# Patient Record
Sex: FEMALE | Race: WHITE | Employment: FULL TIME | URBAN - METROPOLITAN AREA
[De-identification: names, ages, dates, MRNs, and addresses within clinical notes are randomized per-mention and may not be internally consistent; named-entity substitution may affect disease eponyms.]

---

## 2017-02-01 ENCOUNTER — HOSPITAL ENCOUNTER (OUTPATIENT)
Dept: BONE DENSITY | Age: 47
Discharge: HOME | End: 2017-02-01
Attending: INTERNAL MEDICINE
Payer: COMMERCIAL

## 2017-02-01 DIAGNOSIS — K52.81 EOSINOPHILIC GASTROENTERITIS: ICD-10-CM

## 2017-02-01 PROCEDURE — 77080 DXA BONE DENSITY AXIAL: CPT

## 2017-02-17 ENCOUNTER — HOSPITAL ENCOUNTER (EMERGENCY)
Facility: HOSPITAL | Age: 47
Discharge: HOME | End: 2017-02-17
Attending: EMERGENCY MEDICINE
Payer: COMMERCIAL

## 2017-02-17 VITALS
SYSTOLIC BLOOD PRESSURE: 124 MMHG | OXYGEN SATURATION: 92 % | TEMPERATURE: 97 F | HEART RATE: 91 BPM | HEIGHT: 64 IN | DIASTOLIC BLOOD PRESSURE: 76 MMHG | BODY MASS INDEX: 50.02 KG/M2 | RESPIRATION RATE: 18 BRPM | WEIGHT: 293 LBS

## 2017-02-17 DIAGNOSIS — M54.50 BACK PAIN, LUMBOSACRAL: Primary | ICD-10-CM

## 2017-02-17 PROCEDURE — 96376 TX/PRO/DX INJ SAME DRUG ADON: CPT

## 2017-02-17 PROCEDURE — 96374 THER/PROPH/DIAG INJ IV PUSH: CPT

## 2017-02-17 PROCEDURE — 96375 TX/PRO/DX INJ NEW DRUG ADDON: CPT

## 2017-02-17 PROCEDURE — 99285 EMERGENCY DEPT VISIT HI MDM: CPT

## 2017-02-17 PROCEDURE — 99284 EMERGENCY DEPT VISIT MOD MDM: CPT

## 2017-02-17 RX ORDER — KETOROLAC TROMETHAMINE 30 MG/ML
15 INJECTION, SOLUTION INTRAMUSCULAR; INTRAVENOUS ONCE
Status: COMPLETED | OUTPATIENT
Start: 2017-02-17 | End: 2017-02-17

## 2017-02-17 RX ORDER — HYDROCODONE BITARTRATE AND ACETAMINOPHEN 5; 325 MG/1; MG/1
1-2 TABLET ORAL EVERY 4 HOURS PRN
Qty: 20 TABLET | Refills: 0 | Status: SHIPPED | OUTPATIENT
Start: 2017-02-17 | End: 2017-02-24

## 2017-02-17 RX ORDER — CYCLOBENZAPRINE HCL 10 MG
10 TABLET ORAL 3 TIMES DAILY PRN
Qty: 20 TABLET | Refills: 0 | Status: SHIPPED | OUTPATIENT
Start: 2017-02-17 | End: 2017-02-24

## 2017-02-17 RX ORDER — HYDROMORPHONE HYDROCHLORIDE 1 MG/ML
1 INJECTION, SOLUTION INTRAMUSCULAR; INTRAVENOUS; SUBCUTANEOUS ONCE
Status: COMPLETED | OUTPATIENT
Start: 2017-02-17 | End: 2017-02-17

## 2017-02-17 RX ORDER — DEXAMETHASONE SODIUM PHOSPHATE 4 MG/ML
10 VIAL (ML) INJECTION ONCE
Status: COMPLETED | OUTPATIENT
Start: 2017-02-17 | End: 2017-02-17

## 2017-02-17 RX ORDER — METHYLPREDNISOLONE 4 MG/1
TABLET ORAL
Qty: 1 PACKAGE | Refills: 0 | Status: SHIPPED | OUTPATIENT
Start: 2017-02-17 | End: 2017-02-22

## 2017-02-17 RX ORDER — ONDANSETRON 2 MG/ML
4 INJECTION INTRAMUSCULAR; INTRAVENOUS ONCE
Status: COMPLETED | OUTPATIENT
Start: 2017-02-17 | End: 2017-02-17

## 2017-02-17 NOTE — ED NOTES
Patient denies any change in pain after medications.   Side rails x 2 up, call light in reach  VSS, will update MD

## 2017-02-17 NOTE — ED PROVIDER NOTES
Patient Seen in: [facility] Emergency Department    History   Patient presents with:  Back Pain (musculoskeletal)    Stated Complaint: back pain    HPI    53-year-old female complaining of back pain this patient has a history of back pain had an MRI OTHER SURGICAL HISTORY      Comment upper endoscopy    COLONOSCOPY  8/14/2013    Comment Procedure: COLONOSCOPY;  Surgeon: Margoth Cline MD;  Location: [facility] ENDOSCOPY    ENDOMETRIAL ABLATION      DILATION/CURETTAGE,DIAGNOSTIC      COLONOSCOPY  10/3/2014 escitalopram 10 MG Oral Tab,  1 tablet nightly   Pantoprazole Sodium (PROTONIX) 40 MG Oral Tab EC,  TAKE 1 TABLET BY MOUTH ONCE DAILY   Hyoscyamine Sulfate ER (HYOMAX-SR) 0.375 MG Oral Tablet 12 Hr,  Take 1 tablet by mouth daily as needed.    ARIpiprazole ( IV was started patient was given 2 doses of Dilaudid as well as Toradol Decadron was feeling better able ambulate prior to discharge she was advised to follow-up with [facility] for possible epidural injection next week she was give

## 2017-02-17 NOTE — ED INITIAL ASSESSMENT (HPI)
Patient c/o lower back and leg aching yesterday during the day. Went to the restroom in the middle of the night and had sudden onset of right lower back pain going down right leg.   Now reports she has had chronic back pain since October, had an injection

## 2017-06-28 PROCEDURE — 82607 VITAMIN B-12: CPT | Performed by: INTERNAL MEDICINE

## 2017-06-28 PROCEDURE — 82746 ASSAY OF FOLIC ACID SERUM: CPT | Performed by: INTERNAL MEDICINE

## 2018-04-24 NOTE — LETTER
Date: 10/30/2024    Patient Name: Jennifer Wills          To Whom it may concern:    The above patient was seen at [facility] for treatment of a medical condition.    This patient should be excused from attending work/school from 10/30/24 through 11/1/24.        Sincerely,        Cortney Warner MD      
(3) adequate

## 2018-05-30 ENCOUNTER — APPOINTMENT (OUTPATIENT)
Dept: CT IMAGING | Facility: HOSPITAL | Age: 48
End: 2018-05-30
Attending: EMERGENCY MEDICINE
Payer: COMMERCIAL

## 2018-05-30 ENCOUNTER — HOSPITAL ENCOUNTER (EMERGENCY)
Facility: HOSPITAL | Age: 48
Discharge: HOME | End: 2018-05-30
Attending: EMERGENCY MEDICINE
Payer: COMMERCIAL

## 2018-05-30 ENCOUNTER — APPOINTMENT (OUTPATIENT)
Dept: GENERAL RADIOLOGY | Facility: HOSPITAL | Age: 48
End: 2018-05-30
Attending: EMERGENCY MEDICINE
Payer: COMMERCIAL

## 2018-05-30 ENCOUNTER — PRIOR ORIGINAL RECORDS (OUTPATIENT)
Dept: OTHER | Age: 48
End: 2018-05-30

## 2018-05-30 VITALS
BODY MASS INDEX: 50.02 KG/M2 | TEMPERATURE: 99 F | SYSTOLIC BLOOD PRESSURE: 132 MMHG | OXYGEN SATURATION: 98 % | HEART RATE: 72 BPM | HEIGHT: 64 IN | DIASTOLIC BLOOD PRESSURE: 70 MMHG | RESPIRATION RATE: 14 BRPM | WEIGHT: 293 LBS

## 2018-05-30 DIAGNOSIS — R07.9 CHEST PAIN OF UNCERTAIN ETIOLOGY: Primary | ICD-10-CM

## 2018-05-30 PROCEDURE — 93010 ELECTROCARDIOGRAM REPORT: CPT

## 2018-05-30 PROCEDURE — 99285 EMERGENCY DEPT VISIT HI MDM: CPT

## 2018-05-30 PROCEDURE — 84484 ASSAY OF TROPONIN QUANT: CPT | Performed by: EMERGENCY MEDICINE

## 2018-05-30 PROCEDURE — 71275 CT ANGIOGRAPHY CHEST: CPT | Performed by: EMERGENCY MEDICINE

## 2018-05-30 PROCEDURE — 85378 FIBRIN DEGRADE SEMIQUANT: CPT | Performed by: EMERGENCY MEDICINE

## 2018-05-30 PROCEDURE — 80053 COMPREHEN METABOLIC PANEL: CPT | Performed by: EMERGENCY MEDICINE

## 2018-05-30 PROCEDURE — 71045 X-RAY EXAM CHEST 1 VIEW: CPT | Performed by: EMERGENCY MEDICINE

## 2018-05-30 PROCEDURE — 85025 COMPLETE CBC W/AUTO DIFF WBC: CPT | Performed by: EMERGENCY MEDICINE

## 2018-05-30 PROCEDURE — 93005 ELECTROCARDIOGRAM TRACING: CPT

## 2018-05-30 PROCEDURE — 36415 COLL VENOUS BLD VENIPUNCTURE: CPT

## 2018-05-30 NOTE — ED INITIAL ASSESSMENT (HPI)
Pt reports chest tightness, leg edema at the end of the day, dyspnea, dizziness, nausea for the past 8 months.

## 2018-05-30 NOTE — ED PROVIDER NOTES
Patient Seen in: [facility] Emergency Department    History   Patient presents with:  Chest Pain Angina (cardiovascular)    Stated Complaint: chest pain    HPI    This is a 40-year-old female presenting to the emergency department for chest pain.   Dereje Jones COLONOSCOPY      Comment: Procedure: COLONOSCOPY;  Surgeon: Thierno Villatoro MD;  Location: [address] [place] [address] ENDOSCOPY  10/2014: COLONOSCOPY      Comment: repeat in 10 years  10/8/2014: COLONOSCOPY N/A      Comment: Procedure: COLONOSCOPY;  Surgeon: Ramakrishna Joshi no wheezes retractions or rhonchi noted  Cardiovascular exam shows a regular rate and rhythm  Abdomen soft nontender with no rebound tenderness noted  Extremity exam shows no clubbing cyanosis or edema  Skin exam shows no rashes or lacerations  Neuro exam CBC W/ DIFFERENTIAL[[id number]]          Abnormal            Final result                 Please view results for these tests on the individual orders.    POCT PREGNANCY, URINE   RAINBOW DRAW BLUE   RAINBOW DRAW LAVENDER   RAINBOW DRAW LIGHT GRE

## 2018-07-12 LAB
ALBUMIN: 3.5 G/DL
ALKALINE PHOSPHATATE(ALK PHOS): 136 IU/L
BILIRUBIN TOTAL: 0.2 MG/DL
BUN: 13 MG/DL
CALCIUM: 9.1 MG/DL
CHLORIDE: 102 MEQ/L
CREATININE, SERUM: 1.07 MG/DL
GLUCOSE: 109 MG/DL
HEMATOCRIT: 39.5 %
HEMOGLOBIN: 12.5 G/DL
PLATELETS: 338 K/UL
POTASSIUM, SERUM: 3.3 MEQ/L
PROTEIN, TOTAL: 8.4 G/DL
RED BLOOD COUNT: 5.35 X 10-6/U
SGOT (AST): 16 IU/L
SGPT (ALT): 33 IU/L
SODIUM: 136 MEQ/L
WHITE BLOOD COUNT: 8.6 X 10-3/U

## 2018-08-06 PROBLEM — R12 HEARTBURN: Status: ACTIVE | Noted: 2018-08-06

## 2018-09-26 PROBLEM — F41.1 ANXIETY STATE: Status: ACTIVE | Noted: 2018-09-26

## 2018-09-26 PROBLEM — G47.00 INSOMNIA: Status: ACTIVE | Noted: 2018-09-26

## 2018-11-01 ENCOUNTER — IMMUNIZATION (OUTPATIENT)
Dept: FAMILY MEDICINE CLINIC | Facility: CLINIC | Age: 48
End: 2018-11-01
Payer: COMMERCIAL

## 2018-11-01 DIAGNOSIS — Z23 NEED FOR VACCINATION: ICD-10-CM

## 2018-11-01 PROCEDURE — 90686 IIV4 VACC NO PRSV 0.5 ML IM: CPT | Performed by: NURSE PRACTITIONER

## 2018-11-01 PROCEDURE — 90471 IMMUNIZATION ADMIN: CPT | Performed by: NURSE PRACTITIONER

## 2018-11-12 PROBLEM — R41.840 ATTENTION AND CONCENTRATION DEFICIT: Status: ACTIVE | Noted: 2018-11-12

## 2018-11-28 PROBLEM — IMO0002 CHRONIC MIGRAINE: Status: ACTIVE | Noted: 2018-11-28

## 2019-01-01 ENCOUNTER — EXTERNAL RECORD (OUTPATIENT)
Dept: HEALTH INFORMATION MANAGEMENT | Facility: OTHER | Age: 49
End: 2019-01-01

## 2019-01-03 PROCEDURE — 87086 URINE CULTURE/COLONY COUNT: CPT | Performed by: EMERGENCY MEDICINE

## 2019-04-11 PROBLEM — M20.5X1 HALLUX LIMITUS OF RIGHT FOOT: Status: ACTIVE | Noted: 2019-04-11

## 2019-05-18 ENCOUNTER — HOSPITAL ENCOUNTER (EMERGENCY)
Facility: HOSPITAL | Age: 49
Discharge: LEFT BEFORE DISCHARGE | End: 2019-05-18
Payer: COMMERCIAL

## 2019-05-18 VITALS
DIASTOLIC BLOOD PRESSURE: 79 MMHG | RESPIRATION RATE: 20 BRPM | SYSTOLIC BLOOD PRESSURE: 161 MMHG | OXYGEN SATURATION: 95 % | BODY MASS INDEX: 50.02 KG/M2 | WEIGHT: 293 LBS | HEART RATE: 92 BPM | TEMPERATURE: 98 F | HEIGHT: 64 IN

## 2019-05-18 NOTE — ED INITIAL ASSESSMENT (HPI)
headache; feeling confused and weak, began 3 days ago, would go back and forth, worsening this am, no n/v

## 2019-05-18 NOTE — ED NOTES
Pt approached triage desk/nurse stating that she is going to go home. Pt informed that room is assigned and waiting only for cleaning. Spoke with patient about concerning symptoms and elevated BP needing to be seen. Pt declined being seen.   Left with sp

## 2019-05-22 ENCOUNTER — OFFICE VISIT (OUTPATIENT)
Dept: INTERNAL MEDICINE CLINIC | Facility: CLINIC | Age: 49
End: 2019-05-22
Payer: COMMERCIAL

## 2019-05-22 VITALS
DIASTOLIC BLOOD PRESSURE: 92 MMHG | HEIGHT: 64 IN | SYSTOLIC BLOOD PRESSURE: 148 MMHG | HEART RATE: 130 BPM | RESPIRATION RATE: 16 BRPM | BODY MASS INDEX: 50.02 KG/M2 | WEIGHT: 293 LBS

## 2019-05-22 DIAGNOSIS — R41.840 ATTENTION AND CONCENTRATION DEFICIT: ICD-10-CM

## 2019-05-22 DIAGNOSIS — Z51.81 ENCOUNTER FOR THERAPEUTIC DRUG MONITORING: Primary | ICD-10-CM

## 2019-05-22 DIAGNOSIS — M54.16 LUMBAR RADICULITIS: ICD-10-CM

## 2019-05-22 DIAGNOSIS — IMO0002 CHRONIC MIGRAINE: ICD-10-CM

## 2019-05-22 DIAGNOSIS — I10 ESSENTIAL HYPERTENSION: ICD-10-CM

## 2019-05-22 DIAGNOSIS — Z99.89 OSA ON CPAP: ICD-10-CM

## 2019-05-22 DIAGNOSIS — R12 HEARTBURN: ICD-10-CM

## 2019-05-22 DIAGNOSIS — G47.33 OSA ON CPAP: ICD-10-CM

## 2019-05-22 DIAGNOSIS — G25.81 RLS (RESTLESS LEGS SYNDROME): ICD-10-CM

## 2019-05-22 DIAGNOSIS — E66.01 MORBID OBESITY WITH BMI OF 50.0-59.9, ADULT (HCC): ICD-10-CM

## 2019-05-22 PROCEDURE — 99204 OFFICE O/P NEW MOD 45 MIN: CPT | Performed by: NURSE PRACTITIONER

## 2019-05-22 RX ORDER — SEMAGLUTIDE 1.34 MG/ML
0.25 INJECTION, SOLUTION SUBCUTANEOUS WEEKLY
Qty: 1 PEN | Refills: 1 | COMMUNITY
Start: 2019-05-22 | End: 2019-05-30

## 2019-05-22 RX ORDER — SEMAGLUTIDE 1.34 MG/ML
0.25 INJECTION, SOLUTION SUBCUTANEOUS WEEKLY
Qty: 1 PEN | Refills: 1 | Status: SHIPPED | OUTPATIENT
Start: 2019-05-22 | End: 2019-07-29

## 2019-05-22 NOTE — PROGRESS NOTES
HISTORY OF PRESENT ILLNESS  Patient presents with:  Weight Check: internet. phentermine many years ago lost 20 pounds in 3 months.  no regular exercise    Rileyanna Parkerlaabdirahman is a 46year old female new to our office today for initiation of medical weight loss personal history of Pancreatic issues / Medullary Thyroid Cancer: negative  History of bariatric surgery: negative    REVIEW OF SYSTEMS  GENERAL: negative for activity change, appetite change + fatigue  NECK: denies thickening  LUNGS: denies shortness of b 778 05/21/2019     Lab Results   Component Value Date    VITD 36.0 07/09/2014         Current Outpatient Medications on File Prior to Visit:  amoxicillin 875 MG Oral Tab Take 1 tablet (875 mg total) by mouth 2 (two) times daily for 7 days.  Disp: 14 tablet B12, HEMOGLOBIN A1C, OZEMPIC 0.25 or         0.5 MG/DOSE Subcutaneous Solution Pen-injector    (E66.01,  Z68.43) Morbid obesity with BMI of 50.0-59.9, adult ([facility])  Plan: HEMOGLOBIN A1C, OZEMPIC 0.25 or 0.5 MG/DOSE         Subcutaneous Solution Pen-injector with dietitian  -low carb high protein  -portion control  -Limit carbohydrates  -Eat breakfast every day   -Don't skip meals   -Read nutrition labels and keep a food log  -drink a lot of water 65 oz of water per day  - Do not drink your calories (no soda, Pal-->When you set-up the william or need to adjust settings:                Goals should include:                  Lose 1.5-2 lbs per week                Activity level: not very active (can't count exercise towards calorie number per day)                   **

## 2019-05-22 NOTE — PATIENT INSTRUCTIONS
PLAN:  Continue with medications: ozempic 0.25mg X 4 weeks, 0.5mg X 4 weeks and 1mg X 4 weeks and stay at this dose  Go to the lab for blood work  Follow up with me in 1 month  Schedule follow up appointments:  Dietitian/nutritionist      Please try to wor helps with mindfulness, meditation, clarity, sleep, and jamie to your daily life.

## 2019-05-22 NOTE — PROGRESS NOTES
Good news!  Temitope Liang is covered by your patient's prescription insurance plan  Based on the information provided, your patient can expect to pay:  $40

## 2019-05-25 ENCOUNTER — LAB ENCOUNTER (OUTPATIENT)
Dept: LAB | Facility: HOSPITAL | Age: 49
End: 2019-05-25
Attending: NURSE PRACTITIONER
Payer: COMMERCIAL

## 2019-05-25 DIAGNOSIS — Z51.81 ENCOUNTER FOR THERAPEUTIC DRUG MONITORING: ICD-10-CM

## 2019-05-25 DIAGNOSIS — F33.9 RECURRENT MAJOR DEPRESSIVE DISORDER, REMISSION STATUS UNSPECIFIED (HCC): ICD-10-CM

## 2019-05-25 DIAGNOSIS — E66.01 MORBID OBESITY WITH BMI OF 50.0-59.9, ADULT (HCC): ICD-10-CM

## 2019-05-25 DIAGNOSIS — F41.1 GAD (GENERALIZED ANXIETY DISORDER): ICD-10-CM

## 2019-05-25 PROCEDURE — 36415 COLL VENOUS BLD VENIPUNCTURE: CPT

## 2019-05-25 PROCEDURE — 82306 VITAMIN D 25 HYDROXY: CPT

## 2019-05-25 PROCEDURE — 83036 HEMOGLOBIN GLYCOSYLATED A1C: CPT

## 2019-05-28 NOTE — PROGRESS NOTES
Your blood work are within normal except:   Elevated blood sugar (a1c) it is in the prediabetes/diabetes range: 6.4% I recommend avoiding carbs, exercise, and continuing with ozempic (new medication that we started)   If you have any questions or concerns

## 2019-06-20 ENCOUNTER — OFFICE VISIT (OUTPATIENT)
Dept: FAMILY MEDICINE CLINIC | Facility: CLINIC | Age: 49
End: 2019-06-20
Payer: COMMERCIAL

## 2019-06-20 VITALS
RESPIRATION RATE: 18 BRPM | OXYGEN SATURATION: 99 % | BODY MASS INDEX: 48.48 KG/M2 | SYSTOLIC BLOOD PRESSURE: 132 MMHG | TEMPERATURE: 98 F | WEIGHT: 291 LBS | HEIGHT: 65 IN | DIASTOLIC BLOOD PRESSURE: 74 MMHG | HEART RATE: 60 BPM

## 2019-06-20 DIAGNOSIS — H60.502 ACUTE NONINFECTIVE OTITIS EXTERNA OF LEFT EAR, UNSPECIFIED TYPE: Primary | ICD-10-CM

## 2019-06-20 PROCEDURE — 99213 OFFICE O/P EST LOW 20 MIN: CPT | Performed by: NURSE PRACTITIONER

## 2019-06-20 NOTE — PROGRESS NOTES
CHIEF COMPLAINT:   Patient presents with:  Ear Pain    HPI:   Arnoldo Carter is a 46year old female who presents to clinic today with complaints of left ear pain. Has had for 6 days. Pt reports worsening of pain when presses on left tragus.    Patient r clotrimazole-betamethasone 1-0.05 % External Cream Apply 1 Application topically 2 (two) times daily as needed. Disp: 60 g Rfl: 3   Propranolol HCl 20 MG Oral Tab Take 1-2 tabs half hour prior to anxiety provoking situations po up to tid.  Disp: 90 tablet R • Migraines    • Nausea    • Night sweats    • OA (osteoarthritis)     right knee   • Obesity    • TAYLOR (obstructive sleep apnea)     cpap   • Osteoarthrosis, unspecified whether generalized or localized, unspecified site    • Pain in joints    • Pain with THROAT: oral mucosa pink, moist. Posterior pharynx is not erythematous or injected. No exudates. NECK: supple  LUNGS: clear to auscultation bilaterally, no wheezes or rhonchi. Breathing is non labored.   CARDIO: RRR without murmur  EXTREMITIES: no cyanosis Symptoms can include pain, fever, itching, redness, drainage, or swelling of the ear canal. Temporary hearing loss may also occur.   Home care  · Do not try to clean the ear canal. This can push pus and bacteria deeper into the canal.  · Use prescribed ear © 7009-8623 [address]. [address] [facility], [address] [place]. All rights reserved. This information is not intended as a substitute for professional medical care. Always follow your healthcare professional's instructions.               P

## 2019-06-26 ENCOUNTER — OFFICE VISIT (OUTPATIENT)
Dept: INTERNAL MEDICINE CLINIC | Facility: CLINIC | Age: 49
End: 2019-06-26
Payer: COMMERCIAL

## 2019-06-26 VITALS
HEART RATE: 100 BPM | RESPIRATION RATE: 16 BRPM | DIASTOLIC BLOOD PRESSURE: 86 MMHG | SYSTOLIC BLOOD PRESSURE: 142 MMHG | BODY MASS INDEX: 49.68 KG/M2 | WEIGHT: 291 LBS | HEIGHT: 64 IN

## 2019-06-26 DIAGNOSIS — Z99.89 OSA ON CPAP: ICD-10-CM

## 2019-06-26 DIAGNOSIS — G47.33 OSA ON CPAP: ICD-10-CM

## 2019-06-26 DIAGNOSIS — Z51.81 ENCOUNTER FOR THERAPEUTIC DRUG MONITORING: Primary | ICD-10-CM

## 2019-06-26 DIAGNOSIS — I10 ESSENTIAL HYPERTENSION: ICD-10-CM

## 2019-06-26 DIAGNOSIS — E66.01 MORBID OBESITY WITH BMI OF 50.0-59.9, ADULT (HCC): ICD-10-CM

## 2019-06-26 DIAGNOSIS — R73.03 PREDIABETES: ICD-10-CM

## 2019-06-26 PROBLEM — F41.9 ANXIETY: Status: ACTIVE | Noted: 2019-05-28

## 2019-06-26 PROBLEM — G43.909 MIGRAINE: Status: ACTIVE | Noted: 2019-05-28

## 2019-06-26 PROBLEM — K58.9 IRRITABLE BOWEL SYNDROME: Status: ACTIVE | Noted: 2019-05-28

## 2019-06-26 PROBLEM — F32.A DEPRESSIVE DISORDER: Status: ACTIVE | Noted: 2019-05-28

## 2019-06-26 PROCEDURE — 99214 OFFICE O/P EST MOD 30 MIN: CPT | Performed by: NURSE PRACTITIONER

## 2019-06-26 RX ORDER — AMOXICILLIN AND CLAVULANATE POTASSIUM 875; 125 MG/1; MG/1
TABLET, FILM COATED ORAL
COMMUNITY
End: 2019-07-05

## 2019-06-26 NOTE — PROGRESS NOTES
HISTORY OF PRESENT ILLNESS  Patient presents with:  Weight Check: lost 6 pounds    Sid Gaming is a 46year old female here for follow up with medical weight loss program for the treatment of overweight, obesity, or morbid obesity.  Patient has lost -# insomnia  +GERD  Diabetes: negative  Thyroid disease: hypothyroidism   Renal disease: negative   Kidney stones: negative   Liver disease: negative  Joint-related conditions:+ lumbar radiculitis, RLS, osteoarthritis, fibro  Migraines/seizures: +migranes   G Results   Component Value Date    VITD 30.9 05/25/2019         Current Outpatient Medications on File Prior to Visit:  Pramoxine-HC-Chloroxylenol (CORTANE-B) 10-10-1 MG/ML Otic Solution Place 4 drops in ear(s) 4 (four) times daily for 7 days.  Disp: 10 mL R MOUTH DAILY Disp: 180 tablet Rfl: 3   spironolactone 50 MG Oral Tab Take 1 tablet (50 mg total) by mouth 2 (two) times daily.  Disp: 180 tablet Rfl: 0   Amoxicillin-Pot Clavulanate (AUGMENTIN) 875-125 MG Oral Tab Augmentin 875 mg-125 mg tablet Take 1 tablet dietitian  -low carb high protein  -portion control  -Limit carbohydrates  -Eat breakfast every day   -Don't skip meals   -Read nutrition labels and keep a food log  -drink a lot of water 65 oz of water per day  - Do not drink your calories (no soda, juice Goals should include:                  Lose 1.5-2 lbs per week                Activity level: not very active (can't count exercise towards calorie number per day)                   ** Daily INPUT> Look at nutrition section-- \"nutrients\" and it will

## 2019-06-26 NOTE — PATIENT INSTRUCTIONS
Keep up the great work! !     PLAN:  Continue with medications: ozempic 0.5mg (increase dose)  Follow up with me in 1 month  Schedule follow up appointments:  Dietitian/nutritionist      Please try to work on the following dietary changes:  1.  Drink lots of jamie to your daily life.

## 2019-07-29 RX ORDER — SEMAGLUTIDE 1.34 MG/ML
1 INJECTION, SOLUTION SUBCUTANEOUS WEEKLY
Qty: 2 PEN | Refills: 0 | Status: SHIPPED | OUTPATIENT
Start: 2019-07-29 | End: 2019-08-26

## 2019-07-29 NOTE — TELEPHONE ENCOUNTER
Requesting Ozempic 1 mg  LOV: 6/26/19  RTC: one month  Last Relevant Labs: 5/25/19  Filled: 5/22/19 #1 pen with 1 refills    Future Appointments   Date Time Provider Ibrahima Eubanks   8/5/2019  1:00 PM RJ Barragan EMGFLACOI EM[facility] [address]     Pen

## 2019-07-29 NOTE — TELEPHONE ENCOUNTER
Patient left voicemail stating that by this time she should be an 1mg ozempic but the pens in the pharmacy are the 0.5mg, patient would like a call to see what she should do or if we are going to send the proper pen to the pharmacy.

## 2019-08-05 ENCOUNTER — OFFICE VISIT (OUTPATIENT)
Dept: INTERNAL MEDICINE CLINIC | Facility: CLINIC | Age: 49
End: 2019-08-05
Payer: COMMERCIAL

## 2019-08-05 VITALS
WEIGHT: 284 LBS | HEIGHT: 64 IN | SYSTOLIC BLOOD PRESSURE: 116 MMHG | DIASTOLIC BLOOD PRESSURE: 82 MMHG | HEART RATE: 112 BPM | RESPIRATION RATE: 16 BRPM | BODY MASS INDEX: 48.49 KG/M2

## 2019-08-05 DIAGNOSIS — F41.9 ANXIETY: ICD-10-CM

## 2019-08-05 DIAGNOSIS — I10 ESSENTIAL HYPERTENSION: ICD-10-CM

## 2019-08-05 DIAGNOSIS — E66.01 MORBID OBESITY WITH BMI OF 50.0-59.9, ADULT (HCC): ICD-10-CM

## 2019-08-05 DIAGNOSIS — Z51.81 ENCOUNTER FOR THERAPEUTIC DRUG MONITORING: Primary | ICD-10-CM

## 2019-08-05 DIAGNOSIS — R73.03 PREDIABETES: ICD-10-CM

## 2019-08-05 PROCEDURE — 99214 OFFICE O/P EST MOD 30 MIN: CPT | Performed by: NURSE PRACTITIONER

## 2019-08-05 RX ORDER — CIPROFLOXACIN AND DEXAMETHASONE 3; 1 MG/ML; MG/ML
SUSPENSION/ DROPS AURICULAR (OTIC)
COMMUNITY
End: 2019-10-10

## 2019-08-05 NOTE — PATIENT INSTRUCTIONS
Keep up the great work! !     PLAN:  Continue with medications: ozempic 1mg  Follow up with me in 1 month  Schedule follow up appointments:  Dietitian/nutritionist      Please try to work on the following dietary changes:  1.  Drink lots of water and cut jori life.

## 2019-08-05 NOTE — PROGRESS NOTES
HISTORY OF PRESENT ILLNESS  Patient presents with:  Weight Check: lost 7 pounds    Gilford Silversmith is a 46year old female here for follow up with medical weight loss program for the treatment of overweight, obesity, or morbid obesity.  Patient has lost -# insomnia  +GERD  Diabetes: negative  Thyroid disease: hypothyroidism   Renal disease: negative   Kidney stones: negative   Liver disease: negative  Joint-related conditions:+ lumbar radiculitis, RLS, osteoarthritis, fibro  Migraines/seizures: +migranes   G Results   Component Value Date    VITD 30.9 05/25/2019         Current Outpatient Medications on File Prior to Visit:  OZEMPIC 1 MG/DOSE Subcutaneous Solution Pen-injector Inject 1 mg into the skin once a week. Disp: 2 pen Rfl: 0   Clobetasol Propionate 0. ciprofloxacin-dexamethasone (CIPRODEX) 0.3-0.1 % Otic Suspension Ciprodex 0.3 %-0.1 % ear drops,suspension Disp:  Rfl:      No current facility-administered medications on file prior to visit.      ASSESSMENT/PLAN  (Z51.81) Encounter for therapeutic drug log  -drink a lot of water 65 oz of water per day  - Do not drink your calories (no soda, juice, high calorie coffee drinks, limit alcohol)  - Do not eat late at night  · FITTE: ACSM recommendations (150-300 minutes/ week in active weight loss)   · Discuss ** Daily INPUT> Look at nutrition section-- \"nutrients\" and it will break down your macros for the day (ie. Protein, carbs, fibers, sugars and fats). Try to stay within these numbers daily     2.  \"7 minute workout\" to help with exercise/activity

## 2019-08-27 RX ORDER — SEMAGLUTIDE 1.34 MG/ML
INJECTION, SOLUTION SUBCUTANEOUS
Qty: 2 PEN | Refills: 0 | Status: SHIPPED | OUTPATIENT
Start: 2019-08-27 | End: 2019-09-22

## 2019-08-27 NOTE — TELEPHONE ENCOUNTER
Requesting Ozempic  LOV: 8/5/19  RTC: one month  Last Relevant Labs: 5/25/19  Filled: 7/29/19 #2 pens with 0 refills    Future Appointments   Date Time Provider Ibrahima Eubanks   9/9/2019  3:00 PM Michelle Fulton APRN EMGWEI EM[facility] [address]     Pended f

## 2019-09-06 ENCOUNTER — TELEPHONE (OUTPATIENT)
Dept: INTERNAL MEDICINE CLINIC | Facility: CLINIC | Age: 49
End: 2019-09-06

## 2019-09-06 NOTE — TELEPHONE ENCOUNTER
Pt called regarding appt and rx ozempic - returned pts call no answer left pt a vmail      Added to list for appt cx after 340 , request refill on vmail           Name of Medication: OZEMPIC 1 MG/DOSE Subcutaneous Solution Pen-inject      Dose:     How is m

## 2019-09-06 NOTE — TELEPHONE ENCOUNTER
Requesting Ozempic  LOV: 8/5/19  RTC: 6 weeks  Last Relevant Labs: 5/25/19  Filled: 8/27/19 #2 pens with 0 refills    Future Appointments   Date Time Provider Ibrahima Eubanks   9/9/2019  3:00 PM RJ Bingham EM[facility] [address]

## 2019-09-23 RX ORDER — SEMAGLUTIDE 1.34 MG/ML
INJECTION, SOLUTION SUBCUTANEOUS
Qty: 2 PEN | Refills: 0 | Status: SHIPPED | OUTPATIENT
Start: 2019-09-23 | End: 2020-02-14

## 2019-09-23 NOTE — TELEPHONE ENCOUNTER
Requesting Ozempic  LOV: 8/5/19  RTC: one month  Last Relevant Labs: 5/25/19  Filled: 8/27/19 #2 pens with 0 refills    10/2/2019  3:20 PM Veronica Fulton APRN EMGCONOR EM[facility] [address]     Pended for approval

## 2019-10-02 ENCOUNTER — HOSPITAL ENCOUNTER (OUTPATIENT)
Age: 49
Discharge: HOME | End: 2019-10-02
Attending: EMERGENCY MEDICINE
Payer: COMMERCIAL

## 2019-10-02 ENCOUNTER — APPOINTMENT (OUTPATIENT)
Dept: GENERAL RADIOLOGY | Age: 49
End: 2019-10-02
Attending: EMERGENCY MEDICINE
Payer: COMMERCIAL

## 2019-10-02 VITALS
TEMPERATURE: 98 F | SYSTOLIC BLOOD PRESSURE: 137 MMHG | HEART RATE: 90 BPM | DIASTOLIC BLOOD PRESSURE: 83 MMHG | WEIGHT: 287 LBS | HEIGHT: 64.5 IN | OXYGEN SATURATION: 100 % | BODY MASS INDEX: 48.4 KG/M2 | RESPIRATION RATE: 18 BRPM

## 2019-10-02 DIAGNOSIS — S93.401A MODERATE RIGHT ANKLE SPRAIN, INITIAL ENCOUNTER: Primary | ICD-10-CM

## 2019-10-02 DIAGNOSIS — S63.501A SPRAIN OF RIGHT WRIST, INITIAL ENCOUNTER: ICD-10-CM

## 2019-10-02 PROCEDURE — 73610 X-RAY EXAM OF ANKLE: CPT | Performed by: EMERGENCY MEDICINE

## 2019-10-02 PROCEDURE — 73630 X-RAY EXAM OF FOOT: CPT | Performed by: EMERGENCY MEDICINE

## 2019-10-02 PROCEDURE — 73110 X-RAY EXAM OF WRIST: CPT | Performed by: EMERGENCY MEDICINE

## 2019-10-02 PROCEDURE — 99213 OFFICE O/P EST LOW 20 MIN: CPT

## 2019-10-02 PROCEDURE — 99214 OFFICE O/P EST MOD 30 MIN: CPT

## 2019-10-02 NOTE — ED PROVIDER NOTES
Patient Seen in: [address] [facility]      History   Patient presents with:  Upper Extremity Injury (musculoskeletal): R wrist  Lower Extremity Injury (musculoskeletal): R ankle    Stated Complaint: right ankle injury x1 day    HPI glasses               Past Surgical History:   Procedure Laterality Date   • CHOLECYSTECTOMY  1/2001    gallbladder removed   • COLONOSCOPY  10/2014    repeat in 10 years   • COLONOSCOPY N/A 10/8/2014    Performed by Ayana Amin MD at [facility] ENDOSCOPY is no navicular tenderness neurovascular intact  Right ankle there is moderate tenderness with mild swelling on the lateral malleolus the medial aspect nontender Achilles tendon is intact there is some tenderness on the plantar surface of the midfoot neuro

## 2019-10-02 NOTE — ED NOTES
Pt left with stirrup splint unapplied, but took to apply at home.  Instructions and demonstration was given

## 2019-10-10 ENCOUNTER — OFFICE VISIT (OUTPATIENT)
Dept: INTERNAL MEDICINE CLINIC | Facility: CLINIC | Age: 49
End: 2019-10-10
Payer: COMMERCIAL

## 2019-10-10 VITALS
HEIGHT: 64 IN | RESPIRATION RATE: 14 BRPM | BODY MASS INDEX: 49.17 KG/M2 | HEART RATE: 70 BPM | DIASTOLIC BLOOD PRESSURE: 80 MMHG | WEIGHT: 288 LBS | SYSTOLIC BLOOD PRESSURE: 110 MMHG

## 2019-10-10 DIAGNOSIS — E66.01 MORBID OBESITY WITH BMI OF 50.0-59.9, ADULT (HCC): ICD-10-CM

## 2019-10-10 DIAGNOSIS — Z51.81 ENCOUNTER FOR THERAPEUTIC DRUG MONITORING: Primary | ICD-10-CM

## 2019-10-10 DIAGNOSIS — Z99.89 OSA ON CPAP: ICD-10-CM

## 2019-10-10 DIAGNOSIS — R73.03 PREDIABETES: ICD-10-CM

## 2019-10-10 DIAGNOSIS — F41.9 ANXIETY: ICD-10-CM

## 2019-10-10 DIAGNOSIS — G47.33 OSA ON CPAP: ICD-10-CM

## 2019-10-10 DIAGNOSIS — I10 ESSENTIAL HYPERTENSION: ICD-10-CM

## 2019-10-10 PROCEDURE — 99214 OFFICE O/P EST MOD 30 MIN: CPT | Performed by: NURSE PRACTITIONER

## 2019-10-10 RX ORDER — SEMAGLUTIDE 1.34 MG/ML
INJECTION, SOLUTION SUBCUTANEOUS
Qty: 2 PEN | Refills: 0 | Status: CANCELLED | OUTPATIENT
Start: 2019-10-10

## 2019-10-10 RX ORDER — SEMAGLUTIDE 1.34 MG/ML
1 INJECTION, SOLUTION SUBCUTANEOUS WEEKLY
Qty: 6 PEN | Refills: 0 | Status: SHIPPED | OUTPATIENT
Start: 2019-10-10 | End: 2020-01-10

## 2019-10-10 NOTE — PROGRESS NOTES
HISTORY OF PRESENT ILLNESS  Patient presents with:  Weight Check: up [address] Manny is a 46year old female here for follow up with medical weight loss program for the treatment of overweight, obesity, or morbid obesity.  Patient has gained -# 4 lbs s reviewed:   Cardiac disorders: HTN  +TAYLOR, insomnia  +GERD  Diabetes: negative  Thyroid disease: hypothyroidism   Renal disease: negative   Kidney stones: negative   Liver disease: negative  Joint-related conditions:+ lumbar radiculitis, RLS, osteoarthritis Component Value Date    B12 778 05/21/2019     Lab Results   Component Value Date    VITD 30.9 05/25/2019         Current Outpatient Medications on File Prior to Visit:  ARIPIPRAZOLE 2 MG Oral Tab TAKE 1 TABLET(2 MG) BY MOUTH DAILY Disp: 30 tablet Rfl: 0 ASSESSMENT/PLAN  (Z51.81) Encounter for therapeutic drug monitoring  (primary encounter diagnosis)    (E66.01,  Z68.43) Morbid obesity with BMI of 50.0-59.9, adult ([facility]    (I10) Essential hypertension    (R73.03) Prediabetes    (F41.9) Anxiety    (G47 drinks, limit alcohol)  - Do not eat late at night  · FITTE: ACSM recommendations (150-300 minutes/ week in active weight loss)   · Discussed the role of sleep and stress in weight management  · Weight Loss Contract reviewed and signed today 5/22/2019    L

## 2019-10-10 NOTE — PATIENT INSTRUCTIONS
PLAN:  Continue with medications: ozempic 1mg  Follow up with me in 1 month  Schedule follow up appointments:  Dietitian/nutritionist      Please try to work on the following dietary changes:  1.  Drink lots of water and cut down on soda/juice consumption

## 2019-11-22 ENCOUNTER — TELEPHONE (OUTPATIENT)
Dept: INTERNAL MEDICINE CLINIC | Facility: CLINIC | Age: 49
End: 2019-11-22

## 2019-11-22 DIAGNOSIS — I10 ESSENTIAL HYPERTENSION: ICD-10-CM

## 2019-11-22 DIAGNOSIS — E66.01 MORBID OBESITY (HCC): Primary | ICD-10-CM

## 2019-11-22 DIAGNOSIS — R73.03 PREDIABETES: ICD-10-CM

## 2019-11-22 NOTE — TELEPHONE ENCOUNTER
Mariela Jorgensen RN Hi Amy,     Can you enter a Dietitian Referral for Angelo Mom to sign for this patient. She sees Esthela Florian on 12/3/19 for initial.     Can you include dx E66.01 for me too? Referral done.

## 2019-12-03 ENCOUNTER — HOSPITAL ENCOUNTER (OUTPATIENT)
Age: 49
Discharge: HOME | End: 2019-12-03
Attending: FAMILY MEDICINE
Payer: COMMERCIAL

## 2019-12-03 ENCOUNTER — APPOINTMENT (OUTPATIENT)
Dept: CT IMAGING | Facility: HOSPITAL | Age: 49
End: 2019-12-03
Attending: FAMILY MEDICINE
Payer: COMMERCIAL

## 2019-12-03 VITALS
WEIGHT: 293 LBS | RESPIRATION RATE: 18 BRPM | TEMPERATURE: 97 F | BODY MASS INDEX: 50.02 KG/M2 | SYSTOLIC BLOOD PRESSURE: 144 MMHG | HEART RATE: 84 BPM | DIASTOLIC BLOOD PRESSURE: 94 MMHG | OXYGEN SATURATION: 98 % | HEIGHT: 64 IN

## 2019-12-03 DIAGNOSIS — K59.00 CONSTIPATION, UNSPECIFIED CONSTIPATION TYPE: Primary | ICD-10-CM

## 2019-12-03 DIAGNOSIS — R10.13 EPIGASTRIC PAIN: ICD-10-CM

## 2019-12-03 PROCEDURE — 96375 TX/PRO/DX INJ NEW DRUG ADDON: CPT

## 2019-12-03 PROCEDURE — 99214 OFFICE O/P EST MOD 30 MIN: CPT

## 2019-12-03 PROCEDURE — 85025 COMPLETE CBC W/AUTO DIFF WBC: CPT | Performed by: FAMILY MEDICINE

## 2019-12-03 PROCEDURE — 96376 TX/PRO/DX INJ SAME DRUG ADON: CPT

## 2019-12-03 PROCEDURE — S0028 INJECTION, FAMOTIDINE, 20 MG: HCPCS

## 2019-12-03 PROCEDURE — 74177 CT ABD & PELVIS W/CONTRAST: CPT | Performed by: FAMILY MEDICINE

## 2019-12-03 PROCEDURE — 96374 THER/PROPH/DIAG INJ IV PUSH: CPT

## 2019-12-03 PROCEDURE — 81002 URINALYSIS NONAUTO W/O SCOPE: CPT | Performed by: FAMILY MEDICINE

## 2019-12-03 PROCEDURE — 80047 BASIC METABLC PNL IONIZED CA: CPT

## 2019-12-03 RX ORDER — KETOROLAC TROMETHAMINE 30 MG/ML
15 INJECTION, SOLUTION INTRAMUSCULAR; INTRAVENOUS ONCE
Status: COMPLETED | OUTPATIENT
Start: 2019-12-03 | End: 2019-12-03

## 2019-12-03 RX ORDER — FAMOTIDINE 10 MG/ML
20 INJECTION, SOLUTION INTRAVENOUS ONCE
Status: COMPLETED | OUTPATIENT
Start: 2019-12-03 | End: 2019-12-03

## 2019-12-03 RX ORDER — ONDANSETRON 2 MG/ML
4 INJECTION INTRAMUSCULAR; INTRAVENOUS ONCE
Status: COMPLETED | OUTPATIENT
Start: 2019-12-03 | End: 2019-12-03

## 2019-12-03 NOTE — ED PROVIDER NOTES
Patient Seen in: [facility]      History   Patient presents with:  Abdomen/Flank Pain  Nausea/Vomiting/Diarrhea    Stated Complaint: abdominal pain, nausea, xtoday    HPI    57-year-old female with a history of hypertension cpap   • Osteoarthrosis, unspecified whether generalized or localized, unspecified site    • Pain in joints    • Pain with bowel movements    • Phlebitis and thrombophlebitis of other deep vessels of lower extremities 6/25/2012   • Popliteal synovial cyst nausea, xtoday  Other systems are as noted in HPI. Constitutional and vital signs reviewed. All other systems reviewed and negative except as noted above.     Physical Exam     ED Triage Vitals [12/03/19 1049]   /83   Pulse 93   Resp 16   Temp 9 infiltration of the liver. There is no focal hepatic mass or enlargement. BILIARY:  Status post cholecystectomy. No biliary ductal dilatation is identified. PANCREAS:  No lesion, fluid collection, ductal dilatation, or atrophy.     SPLEEN:  No enlargeme facets at L4 and L5 as described. The could reflect arthritic changes given the facet arthropathy, especially in the absence of any known history of malignancy. The need for additional imaging with   MRI should be based clinically.   4. Fatty infiltration

## 2019-12-03 NOTE — ED INITIAL ASSESSMENT (HPI)
Pt c/o abdominal pain starting this am at 0530. Pt c/o nausea, denies vomiting and diarrhea. Pt states her abdomen is bloated.

## 2019-12-16 RX ORDER — ESCITALOPRAM OXALATE 20 MG/1
20 TABLET ORAL DAILY
COMMUNITY
Start: 2010-08-23

## 2019-12-16 RX ORDER — PROPRANOLOL HYDROCHLORIDE 20 MG/1
20 TABLET ORAL
COMMUNITY
Start: 2019-11-15 | End: 2019-12-01 | Stop reason: ALTCHOICE

## 2019-12-16 RX ORDER — GABAPENTIN 600 MG/1
TABLET ORAL
COMMUNITY
Start: 2019-02-13

## 2019-12-16 RX ORDER — SPIRONOLACTONE 50 MG/1
50 TABLET, FILM COATED ORAL 2 TIMES DAILY
COMMUNITY
Start: 2018-11-29 | End: 2020-04-15 | Stop reason: ALTCHOICE

## 2019-12-16 RX ORDER — CLONAZEPAM 0.5 MG/1
0.5 TABLET ORAL PRN
COMMUNITY
Start: 2019-02-20 | End: 2020-04-15

## 2019-12-16 RX ORDER — TRAZODONE HYDROCHLORIDE 50 MG/1
100 TABLET ORAL DAILY
COMMUNITY
Start: 2019-02-20 | End: 2019-12-01 | Stop reason: ALTCHOICE

## 2019-12-16 RX ORDER — ARIPIPRAZOLE 2 MG/1
2 TABLET ORAL DAILY
COMMUNITY
Start: 2019-11-15

## 2019-12-16 RX ORDER — QUETIAPINE FUMARATE 50 MG/1
TABLET, FILM COATED ORAL PRN
COMMUNITY
Start: 2019-11-15

## 2019-12-16 RX ORDER — ALPRAZOLAM 1 MG/1
1 TABLET, EXTENDED RELEASE ORAL 2 TIMES DAILY
COMMUNITY
Start: 2019-11-15

## 2019-12-16 RX ORDER — DEXTROAMPHETAMINE SACCHARATE, AMPHETAMINE ASPARTATE MONOHYDRATE, DEXTROAMPHETAMINE SULFATE AND AMPHETAMINE SULFATE 2.5; 2.5; 2.5; 2.5 MG/1; MG/1; MG/1; MG/1
10 CAPSULE, EXTENDED RELEASE ORAL DAILY
COMMUNITY
Start: 2019-02-20 | End: 2020-04-15

## 2019-12-16 RX ORDER — ONDANSETRON 4 MG/1
TABLET, ORALLY DISINTEGRATING ORAL
COMMUNITY
Start: 2019-02-01

## 2019-12-16 RX ORDER — PREGABALIN 75 MG/1
CAPSULE ORAL
COMMUNITY
Start: 2011-10-11 | End: 2019-12-01 | Stop reason: ALTCHOICE

## 2019-12-16 RX ORDER — VENLAFAXINE HYDROCHLORIDE 37.5 MG/1
150 CAPSULE, EXTENDED RELEASE ORAL DAILY
COMMUNITY
Start: 2019-03-22 | End: 2019-12-01 | Stop reason: ALTCHOICE

## 2019-12-16 RX ORDER — CHLORTHALIDONE 50 MG/1
TABLET ORAL
COMMUNITY
Start: 2019-03-02 | End: 2020-04-15 | Stop reason: ALTCHOICE

## 2019-12-16 RX ORDER — PANTOPRAZOLE SODIUM 40 MG/1
TABLET, DELAYED RELEASE ORAL
COMMUNITY
Start: 2019-01-11 | End: 2019-12-01 | Stop reason: ALTCHOICE

## 2019-12-16 RX ORDER — ELETRIPTAN HYDROBROMIDE 40 MG/1
40 TABLET, FILM COATED ORAL DAILY
COMMUNITY
Start: 2018-11-28 | End: 2019-12-01 | Stop reason: ALTCHOICE

## 2019-12-16 RX ORDER — DESVENLAFAXINE 100 MG/1
100 TABLET, EXTENDED RELEASE ORAL DAILY
COMMUNITY
Start: 2010-08-23 | End: 2019-12-01 | Stop reason: ALTCHOICE

## 2019-12-16 RX ORDER — AMLODIPINE BESYLATE 5 MG/1
TABLET ORAL
COMMUNITY
Start: 2019-03-02 | End: 2020-04-29 | Stop reason: ALTCHOICE

## 2019-12-17 ENCOUNTER — OFFICE VISIT (OUTPATIENT)
Dept: CARDIOLOGY | Age: 49
End: 2019-12-17

## 2019-12-17 VITALS
HEIGHT: 64 IN | SYSTOLIC BLOOD PRESSURE: 145 MMHG | BODY MASS INDEX: 49.85 KG/M2 | HEART RATE: 103 BPM | DIASTOLIC BLOOD PRESSURE: 93 MMHG | WEIGHT: 292 LBS

## 2019-12-17 DIAGNOSIS — E03.9 ACQUIRED HYPOTHYROIDISM: ICD-10-CM

## 2019-12-17 DIAGNOSIS — R73.9 HYPERGLYCEMIA: ICD-10-CM

## 2019-12-17 DIAGNOSIS — I34.0 NONRHEUMATIC MITRAL VALVE REGURGITATION: ICD-10-CM

## 2019-12-17 DIAGNOSIS — E78.9 LIPID DISORDER: ICD-10-CM

## 2019-12-17 DIAGNOSIS — R06.02 SHORTNESS OF BREATH: ICD-10-CM

## 2019-12-17 DIAGNOSIS — R61 DIAPHORESIS: ICD-10-CM

## 2019-12-17 DIAGNOSIS — I10 HYPERTENSION, BENIGN: ICD-10-CM

## 2019-12-17 DIAGNOSIS — R07.89 CHEST DISCOMFORT: Primary | ICD-10-CM

## 2019-12-17 DIAGNOSIS — Z82.49 FAMILY HISTORY OF CORONARY ARTERIOSCLEROSIS: ICD-10-CM

## 2019-12-17 PROCEDURE — 3080F DIAST BP >= 90 MM HG: CPT | Performed by: INTERNAL MEDICINE

## 2019-12-17 PROCEDURE — 99205 OFFICE O/P NEW HI 60 MIN: CPT | Performed by: INTERNAL MEDICINE

## 2019-12-17 PROCEDURE — 93000 ELECTROCARDIOGRAM COMPLETE: CPT | Performed by: INTERNAL MEDICINE

## 2019-12-17 PROCEDURE — 3077F SYST BP >= 140 MM HG: CPT | Performed by: INTERNAL MEDICINE

## 2019-12-17 RX ORDER — RABEPRAZOLE SODIUM 20 MG/1
1 TABLET, DELAYED RELEASE ORAL DAILY
Refills: 2 | COMMUNITY
Start: 2019-12-13

## 2019-12-17 SDOH — HEALTH STABILITY: PHYSICAL HEALTH: ON AVERAGE, HOW MANY DAYS PER WEEK DO YOU ENGAGE IN MODERATE TO STRENUOUS EXERCISE (LIKE A BRISK WALK)?: 0 DAYS

## 2019-12-17 SDOH — HEALTH STABILITY: MENTAL HEALTH: HOW OFTEN DO YOU HAVE A DRINK CONTAINING ALCOHOL?: MONTHLY OR LESS

## 2019-12-17 SDOH — HEALTH STABILITY: PHYSICAL HEALTH: ON AVERAGE, HOW MANY MINUTES DO YOU ENGAGE IN EXERCISE AT THIS LEVEL?: 0 MIN

## 2019-12-17 ASSESSMENT — PATIENT HEALTH QUESTIONNAIRE - PHQ9
SUM OF ALL RESPONSES TO PHQ9 QUESTIONS 1 AND 2: 2
2. FEELING DOWN, DEPRESSED OR HOPELESS: MORE THAN HALF THE DAYS
SUM OF ALL RESPONSES TO PHQ9 QUESTIONS 1 AND 2: 2
1. LITTLE INTEREST OR PLEASURE IN DOING THINGS: NOT AT ALL

## 2019-12-23 ENCOUNTER — HOSPITAL ENCOUNTER (OUTPATIENT)
Dept: CT IMAGING | Facility: HOSPITAL | Age: 49
Discharge: HOME | End: 2019-12-23
Attending: INTERNAL MEDICINE
Payer: COMMERCIAL

## 2019-12-23 VITALS — RESPIRATION RATE: 16 BRPM | SYSTOLIC BLOOD PRESSURE: 101 MMHG | DIASTOLIC BLOOD PRESSURE: 69 MMHG | HEART RATE: 72 BPM

## 2019-12-23 VITALS — HEART RATE: 64 BPM | DIASTOLIC BLOOD PRESSURE: 69 MMHG | SYSTOLIC BLOOD PRESSURE: 113 MMHG

## 2019-12-23 DIAGNOSIS — R06.02 SOB (SHORTNESS OF BREATH): ICD-10-CM

## 2019-12-23 DIAGNOSIS — R07.89 CHEST DISCOMFORT: ICD-10-CM

## 2019-12-23 DIAGNOSIS — I10 HYPERTENSION, BENIGN: ICD-10-CM

## 2019-12-23 DIAGNOSIS — Z82.49 FAMILY HISTORY OF CORONARY ARTERIOSCLEROSIS: ICD-10-CM

## 2019-12-23 DIAGNOSIS — E78.9 LIPID DISORDER: ICD-10-CM

## 2019-12-23 PROCEDURE — 75574 CT ANGIO HRT W/3D IMAGE: CPT | Performed by: INTERNAL MEDICINE

## 2019-12-23 PROCEDURE — 82565 ASSAY OF CREATININE: CPT

## 2019-12-23 RX ORDER — NITROGLYCERIN 0.4 MG/1
TABLET SUBLINGUAL
Status: COMPLETED
Start: 2019-12-23 | End: 2019-12-23

## 2019-12-23 RX ORDER — METOPROLOL TARTRATE 5 MG/5ML
INJECTION INTRAVENOUS
Status: DISCONTINUED
Start: 2019-12-23 | End: 2019-12-24

## 2019-12-23 RX ORDER — DILTIAZEM HYDROCHLORIDE 5 MG/ML
INJECTION INTRAVENOUS
Status: COMPLETED
Start: 2019-12-23 | End: 2019-12-23

## 2019-12-23 RX ORDER — METOPROLOL TARTRATE 5 MG/5ML
INJECTION INTRAVENOUS
Status: COMPLETED
Start: 2019-12-23 | End: 2019-12-23

## 2019-12-23 RX ORDER — METOPROLOL TARTRATE 50 MG/1
TABLET, FILM COATED ORAL
Status: COMPLETED
Start: 2019-12-23 | End: 2019-12-23

## 2019-12-23 RX ADMIN — DILTIAZEM HYDROCHLORIDE 5 MG: 5 INJECTION INTRAVENOUS at 16:31:00

## 2019-12-23 RX ADMIN — METOPROLOL TARTRATE 5 MG: 5 INJECTION INTRAVENOUS at 16:21:00

## 2019-12-23 RX ADMIN — DILTIAZEM HYDROCHLORIDE 5 MG: 5 INJECTION INTRAVENOUS at 16:36:00

## 2019-12-23 RX ADMIN — METOPROLOL TARTRATE 5 MG: 5 INJECTION INTRAVENOUS at 16:26:00

## 2019-12-23 RX ADMIN — DILTIAZEM HYDROCHLORIDE 5 MG: 5 INJECTION INTRAVENOUS at 16:41:00

## 2019-12-23 RX ADMIN — DILTIAZEM HYDROCHLORIDE 5 MG: 5 INJECTION INTRAVENOUS at 16:26:00

## 2019-12-23 RX ADMIN — NITROGLYCERIN: 0.4 TABLET SUBLINGUAL at 17:02:00

## 2019-12-23 RX ADMIN — DILTIAZEM HYDROCHLORIDE 5 MG: 5 INJECTION INTRAVENOUS at 16:46:00

## 2019-12-23 RX ADMIN — METOPROLOL TARTRATE 100 MG: 50 TABLET, FILM COATED ORAL at 14:23:00

## 2019-12-23 RX ADMIN — METOPROLOL TARTRATE 5 MG: 5 INJECTION INTRAVENOUS at 16:11:00

## 2019-12-27 ENCOUNTER — TELEPHONE (OUTPATIENT)
Dept: CARDIOLOGY | Age: 49
End: 2019-12-27

## 2019-12-31 ENCOUNTER — HOSPITAL ENCOUNTER (OUTPATIENT)
Dept: LAB | Facility: HOSPITAL | Age: 49
Discharge: HOME | End: 2019-12-31
Attending: INTERNAL MEDICINE
Payer: COMMERCIAL

## 2019-12-31 ENCOUNTER — HOSPITAL ENCOUNTER (OUTPATIENT)
Dept: CV DIAGNOSTICS | Facility: HOSPITAL | Age: 49
Discharge: HOME | End: 2019-12-31
Attending: INTERNAL MEDICINE
Payer: COMMERCIAL

## 2019-12-31 DIAGNOSIS — E03.9 ACQUIRED HYPOTHYROIDISM: ICD-10-CM

## 2019-12-31 DIAGNOSIS — E78.9 LIPID DISORDER: ICD-10-CM

## 2019-12-31 DIAGNOSIS — R06.02 SOB (SHORTNESS OF BREATH): ICD-10-CM

## 2019-12-31 DIAGNOSIS — I10 HYPERTENSION, BENIGN: ICD-10-CM

## 2019-12-31 DIAGNOSIS — Z82.49 FAMILY HISTORY OF CORONARY ARTERIOSCLEROSIS: ICD-10-CM

## 2019-12-31 DIAGNOSIS — R73.9 HYPERGLYCEMIA: ICD-10-CM

## 2019-12-31 DIAGNOSIS — R07.89 CHEST DISCOMFORT: ICD-10-CM

## 2019-12-31 LAB
ABSOLUTE IMMATURE GRANULOCYTES (OFFPRE24): NORMAL
ALBUMIN SERPL-MCNC: 3.4 G/DL
ALBUMIN SERPL-MCNC: 3.4 G/DL (ref 3.4–5)
ALBUMIN/GLOB SERPL: 0.7 {RATIO} (ref 1–2)
ALBUMIN/GLOB SERPL: NORMAL {RATIO}
ALP LIVER SERPL-CCNC: 110 U/L (ref 41–108)
ALP SERPL-CCNC: 110 U/L
ALT SERPL-CCNC: 35 U/L
ALT SERPL-CCNC: 35 U/L (ref 13–56)
ANION GAP SERPL CALC-SCNC: 6 MMOL/L (ref 0–18)
ANION GAP SERPL CALC-SCNC: NORMAL MMOL/L
AST SERPL-CCNC: 21 U/L
AST SERPL-CCNC: 21 U/L (ref 15–37)
BASO+EOS+MONOS # BLD: NORMAL 10*3/UL
BASO+EOS+MONOS NFR BLD: NORMAL %
BASOPHILS # BLD AUTO: 0.03 X10(3) UL (ref 0–0.2)
BASOPHILS # BLD: NORMAL 10*3/UL
BASOPHILS NFR BLD AUTO: 0.4 %
BASOPHILS NFR BLD: NORMAL %
BILIRUB SERPL-MCNC: 0.4 MG/DL
BILIRUB SERPL-MCNC: 0.4 MG/DL (ref 0.1–2)
BUN BLD-MCNC: 14 MG/DL (ref 7–18)
BUN SERPL-MCNC: 14 MG/DL
BUN/CREAT SERPL: 16.9 (ref 10–20)
BUN/CREAT SERPL: NORMAL
CALCIUM BLD-MCNC: 8.8 MG/DL (ref 8.5–10.1)
CALCIUM SERPL-MCNC: 8.8 MG/DL
CHLORIDE SERPL-SCNC: 100 MMOL/L
CHLORIDE SERPL-SCNC: 100 MMOL/L (ref 98–112)
CHOLEST SERPL-MCNC: 226 MG/DL
CHOLEST SMN-MCNC: 226 MG/DL (ref ?–200)
CHOLEST/HDLC SERPL: NORMAL {RATIO}
CO2 SERPL-SCNC: 30 MMOL/L (ref 21–32)
CO2 SERPL-SCNC: NORMAL MMOL/L
CREAT BLD-MCNC: 0.83 MG/DL (ref 0.55–1.02)
CREAT SERPL-MCNC: 0.83 MG/DL
DEPRECATED RDW RBC AUTO: 41.9 FL (ref 35.1–46.3)
DIFFERENTIAL METHOD BLD: NORMAL
EOSINOPHIL # BLD AUTO: 0.15 X10(3) UL (ref 0–0.7)
EOSINOPHIL # BLD: NORMAL 10*3/UL
EOSINOPHIL NFR BLD AUTO: 2.1 %
EOSINOPHIL NFR BLD: NORMAL %
ERYTHROCYTE [DISTWIDTH] IN BLOOD BY AUTOMATED COUNT: 15.4 % (ref 11–15)
ERYTHROCYTE [DISTWIDTH] IN BLOOD: NORMAL %
ERYTHROCYTE [SEDIMENTATION RATE] IN BLOOD BY WESTERGREN METHOD: 21 MM/H
GLOBULIN PLAS-MCNC: 5 G/DL (ref 2.8–4.4)
GLOBULIN SER-MCNC: 5 G/DL
GLUCOSE BLD-MCNC: 112 MG/DL (ref 70–99)
GLUCOSE SERPL-MCNC: 112 MG/DL
HCT VFR BLD AUTO: 42.8 % (ref 35–48)
HCT VFR BLD CALC: 42.8 %
HDLC SERPL-MCNC: 45 MG/DL
HDLC SERPL-MCNC: 45 MG/DL (ref 40–59)
HGB BLD-MCNC: 13.5 G/DL
HGB BLD-MCNC: 13.5 G/DL (ref 12–16)
IMM GRANULOCYTES # BLD AUTO: 0.02 X10(3) UL (ref 0–1)
IMM GRANULOCYTES NFR BLD: 0.3 %
IMMATURE GRANULOCYTES (OFFPRE25): NORMAL
LDLC SERPL CALC-MCNC: 138 MG/DL
LDLC SERPL CALC-MCNC: 138 MG/DL (ref ?–100)
LENGTH OF FAST TIME PATIENT: NORMAL H
LENGTH OF FAST TIME PATIENT: NORMAL H
LYMPHOCYTES # BLD AUTO: 1.42 X10(3) UL (ref 1–4)
LYMPHOCYTES # BLD: NORMAL 10*3/UL
LYMPHOCYTES NFR BLD AUTO: 19.6 %
LYMPHOCYTES NFR BLD: NORMAL %
M PROTEIN MFR SERPL ELPH: 8.4 G/DL (ref 6.4–8.2)
MCH RBC QN AUTO: 24.1 PG (ref 26–34)
MCH RBC QN AUTO: NORMAL PG
MCHC RBC AUTO-ENTMCNC: 31.5 G/DL (ref 31–37)
MCHC RBC AUTO-ENTMCNC: NORMAL G/DL
MCV RBC AUTO: 76.3 FL (ref 80–100)
MCV RBC AUTO: NORMAL FL
MONOCYTES # BLD AUTO: 0.64 X10(3) UL (ref 0.1–1)
MONOCYTES # BLD: NORMAL 10*3/UL
MONOCYTES NFR BLD AUTO: 8.9 %
MONOCYTES NFR BLD: NORMAL %
MPV (OFFPRE2): NORMAL
NEUTROPHILS # BLD AUTO: 4.97 X10 (3) UL (ref 1.5–7.7)
NEUTROPHILS # BLD AUTO: 4.97 X10(3) UL (ref 1.5–7.7)
NEUTROPHILS # BLD: NORMAL 10*3/UL
NEUTROPHILS NFR BLD AUTO: 68.7 %
NEUTROPHILS NFR BLD: NORMAL %
NONHDLC SERPL-MCNC: 181 MG/DL
NONHDLC SERPL-MCNC: 181 MG/DL (ref ?–130)
NRBC BLD MANUAL-RTO: NORMAL %
OSMOLALITY SERPL CALC.SUM OF ELEC: 283 MOSM/KG (ref 275–295)
PATIENT FASTING Y/N/NP: YES
PATIENT FASTING Y/N/NP: YES
PLAT MORPH BLD: NORMAL
PLATELET # BLD AUTO: 315 10(3)UL (ref 150–450)
PLATELET # BLD: 315 10*3/UL
POTASSIUM SERPL-SCNC: 3.7 MMOL/L
POTASSIUM SERPL-SCNC: 3.7 MMOL/L (ref 3.5–5.1)
PROT SERPL-MCNC: 8.4 G/DL
RBC # BLD AUTO: 5.61 X10(6)UL (ref 3.8–5.3)
RBC # BLD: 5.61 10*6/UL
RBC MORPH BLD: NORMAL
SED RATE-ML: 21 MM/HR (ref 0–25)
SODIUM SERPL-SCNC: 136 MMOL/L
SODIUM SERPL-SCNC: 136 MMOL/L (ref 136–145)
TRIGL SERPL-MCNC: 216 MG/DL
TRIGL SERPL-MCNC: 216 MG/DL (ref 30–149)
TSH SERPL-ACNC: 2.09 M[IU]/L
TSI SER-ACNC: 2.09 MIU/ML (ref 0.36–3.74)
VLDLC SERPL CALC-MCNC: 43 MG/DL (ref 0–30)
VLDLC SERPL CALC-MCNC: NORMAL MG/DL
WBC # BLD AUTO: 7.2 X10(3) UL (ref 4–11)
WBC # BLD: 7.2 10*3/UL
WBC MORPH BLD: NORMAL

## 2019-12-31 PROCEDURE — 78452 HT MUSCLE IMAGE SPECT MULT: CPT | Performed by: INTERNAL MEDICINE

## 2019-12-31 PROCEDURE — 85025 COMPLETE CBC W/AUTO DIFF WBC: CPT | Performed by: INTERNAL MEDICINE

## 2019-12-31 PROCEDURE — 93306 TTE W/DOPPLER COMPLETE: CPT | Performed by: INTERNAL MEDICINE

## 2019-12-31 PROCEDURE — 80061 LIPID PANEL: CPT | Performed by: INTERNAL MEDICINE

## 2019-12-31 PROCEDURE — 36415 COLL VENOUS BLD VENIPUNCTURE: CPT | Performed by: INTERNAL MEDICINE

## 2019-12-31 PROCEDURE — 80053 COMPREHEN METABOLIC PANEL: CPT | Performed by: INTERNAL MEDICINE

## 2019-12-31 PROCEDURE — 84443 ASSAY THYROID STIM HORMONE: CPT | Performed by: INTERNAL MEDICINE

## 2019-12-31 PROCEDURE — 93017 CV STRESS TEST TRACING ONLY: CPT | Performed by: INTERNAL MEDICINE

## 2019-12-31 PROCEDURE — 93018 CV STRESS TEST I&R ONLY: CPT | Performed by: INTERNAL MEDICINE

## 2019-12-31 PROCEDURE — 85652 RBC SED RATE AUTOMATED: CPT | Performed by: INTERNAL MEDICINE

## 2020-01-03 ENCOUNTER — TELEPHONE (OUTPATIENT)
Dept: CARDIOLOGY | Age: 50
End: 2020-01-03

## 2020-01-07 ENCOUNTER — TELEPHONE (OUTPATIENT)
Dept: CARDIOLOGY | Age: 50
End: 2020-01-07

## 2020-01-07 DIAGNOSIS — E78.9 LIPID DISORDER: Primary | ICD-10-CM

## 2020-01-09 ENCOUNTER — CLINICAL ABSTRACT (OUTPATIENT)
Dept: CARDIOLOGY | Age: 50
End: 2020-01-09

## 2020-01-15 RX ORDER — ATORVASTATIN CALCIUM 10 MG/1
10 TABLET, FILM COATED ORAL NIGHTLY
Qty: 90 TABLET | Refills: 3 | Status: SHIPPED | OUTPATIENT
Start: 2020-01-15

## 2020-02-10 PROBLEM — I10 HYPERTENSIVE DISORDER: Status: RESOLVED | Noted: 2019-05-28 | Resolved: 2020-02-10

## 2020-02-10 PROBLEM — Z51.81 ENCOUNTER FOR THERAPEUTIC DRUG MONITORING: Status: RESOLVED | Noted: 2019-05-22 | Resolved: 2020-02-10

## 2020-03-16 PROBLEM — G43.909 MIGRAINE: Status: RESOLVED | Noted: 2019-05-28 | Resolved: 2020-03-16

## 2020-03-16 PROBLEM — F41.9 ANXIETY: Status: RESOLVED | Noted: 2019-05-28 | Resolved: 2020-03-16

## 2020-03-27 PROBLEM — F33.1 MODERATE EPISODE OF RECURRENT MAJOR DEPRESSIVE DISORDER (HCC): Status: ACTIVE | Noted: 2020-03-27

## 2020-03-27 PROBLEM — F41.0 PANIC DISORDER (EPISODIC PAROXYSMAL ANXIETY): Status: ACTIVE | Noted: 2020-03-27

## 2020-04-15 ENCOUNTER — OFFICE VISIT (OUTPATIENT)
Dept: CARDIOLOGY | Age: 50
End: 2020-04-15

## 2020-04-15 VITALS
WEIGHT: 293 LBS | BODY MASS INDEX: 48.82 KG/M2 | DIASTOLIC BLOOD PRESSURE: 78 MMHG | HEIGHT: 65 IN | HEART RATE: 77 BPM | SYSTOLIC BLOOD PRESSURE: 128 MMHG

## 2020-04-15 DIAGNOSIS — E03.9 ACQUIRED HYPOTHYROIDISM: ICD-10-CM

## 2020-04-15 DIAGNOSIS — Z82.49 FAMILY HISTORY OF CORONARY ARTERIOSCLEROSIS: ICD-10-CM

## 2020-04-15 DIAGNOSIS — R73.9 HYPERGLYCEMIA: ICD-10-CM

## 2020-04-15 DIAGNOSIS — E78.9 LIPID DISORDER: ICD-10-CM

## 2020-04-15 DIAGNOSIS — I10 HYPERTENSION, BENIGN: ICD-10-CM

## 2020-04-15 DIAGNOSIS — R06.02 SHORTNESS OF BREATH: ICD-10-CM

## 2020-04-15 DIAGNOSIS — I34.0 NONRHEUMATIC MITRAL VALVE REGURGITATION: ICD-10-CM

## 2020-04-15 DIAGNOSIS — R61 DIAPHORESIS: ICD-10-CM

## 2020-04-15 DIAGNOSIS — Z79.899 LONG TERM CURRENT USE OF DIURETIC: ICD-10-CM

## 2020-04-15 DIAGNOSIS — R07.89 CHEST DISCOMFORT: Primary | ICD-10-CM

## 2020-04-15 DIAGNOSIS — R53.83 FATIGUE, UNSPECIFIED TYPE: ICD-10-CM

## 2020-04-15 PROCEDURE — 99214 OFFICE O/P EST MOD 30 MIN: CPT | Performed by: INTERNAL MEDICINE

## 2020-04-15 PROCEDURE — 3078F DIAST BP <80 MM HG: CPT | Performed by: INTERNAL MEDICINE

## 2020-04-15 PROCEDURE — 3074F SYST BP LT 130 MM HG: CPT | Performed by: INTERNAL MEDICINE

## 2020-04-15 RX ORDER — CYCLOBENZAPRINE HCL 10 MG
TABLET ORAL AT BEDTIME
COMMUNITY

## 2020-04-15 RX ORDER — PROPRANOLOL HYDROCHLORIDE 20 MG/1
20 TABLET ORAL 3 TIMES DAILY
COMMUNITY

## 2020-04-15 RX ORDER — FUROSEMIDE 20 MG/1
20 TABLET ORAL DAILY
Qty: 30 TABLET | Refills: 3 | Status: SHIPPED | OUTPATIENT
Start: 2020-04-15 | End: 2020-04-29 | Stop reason: ALTCHOICE

## 2020-04-15 RX ORDER — FUROSEMIDE 20 MG/1
20 TABLET ORAL DAILY
COMMUNITY
End: 2020-04-15 | Stop reason: SDUPTHER

## 2020-04-15 ASSESSMENT — ENCOUNTER SYMPTOMS
CHILLS: 0
ALLERGIC/IMMUNOLOGIC COMMENTS: NO NEW FOOD ALLERGIES
HEMOPTYSIS: 0
SUSPICIOUS LESIONS: 0
FEVER: 0
WEIGHT GAIN: 0
COUGH: 0
WEIGHT LOSS: 0
BRUISES/BLEEDS EASILY: 0
HEMATOCHEZIA: 0

## 2020-04-15 ASSESSMENT — PATIENT HEALTH QUESTIONNAIRE - PHQ9
1. LITTLE INTEREST OR PLEASURE IN DOING THINGS: NOT AT ALL
2. FEELING DOWN, DEPRESSED OR HOPELESS: NOT AT ALL
SUM OF ALL RESPONSES TO PHQ9 QUESTIONS 1 AND 2: 0
SUM OF ALL RESPONSES TO PHQ9 QUESTIONS 1 AND 2: 0

## 2020-04-21 ENCOUNTER — APPOINTMENT (OUTPATIENT)
Dept: CARDIOLOGY | Age: 50
End: 2020-04-21

## 2020-04-22 ENCOUNTER — TELEPHONE (OUTPATIENT)
Dept: CARDIOLOGY | Age: 50
End: 2020-04-22

## 2020-04-24 ENCOUNTER — APPOINTMENT (OUTPATIENT)
Dept: LAB | Age: 50
End: 2020-04-24
Attending: INTERNAL MEDICINE
Payer: COMMERCIAL

## 2020-04-24 ENCOUNTER — LAB ENCOUNTER (OUTPATIENT)
Dept: LAB | Age: 50
End: 2020-04-24
Attending: INTERNAL MEDICINE
Payer: COMMERCIAL

## 2020-04-24 DIAGNOSIS — R06.02 SOB (SHORTNESS OF BREATH): ICD-10-CM

## 2020-04-24 DIAGNOSIS — E03.9 MYXEDEMA HEART DISEASE: ICD-10-CM

## 2020-04-24 DIAGNOSIS — R07.89 CHEST DISCOMFORT: ICD-10-CM

## 2020-04-24 DIAGNOSIS — E78.9 DISORDER OF LIPOPROTEIN AND LIPID METABOLISM: ICD-10-CM

## 2020-04-24 DIAGNOSIS — I51.9 MYXEDEMA HEART DISEASE: ICD-10-CM

## 2020-04-24 DIAGNOSIS — R06.02 SHORTNESS OF BREATH: ICD-10-CM

## 2020-04-24 DIAGNOSIS — R73.9 BLOOD GLUCOSE ELEVATED: ICD-10-CM

## 2020-04-24 DIAGNOSIS — Z00.00 LABORATORY EXAMINATION ORDERED AS PART OF A COMPLETE PHYSICAL EXAMINATION: ICD-10-CM

## 2020-04-24 DIAGNOSIS — I10 ESSENTIAL HYPERTENSION, MALIGNANT: ICD-10-CM

## 2020-04-24 DIAGNOSIS — Z79.899 ENCOUNTER FOR LONG-TERM (CURRENT) USE OF OTHER MEDICATIONS: Primary | ICD-10-CM

## 2020-04-24 PROCEDURE — 84439 ASSAY OF FREE THYROXINE: CPT

## 2020-04-24 PROCEDURE — 83735 ASSAY OF MAGNESIUM: CPT

## 2020-04-24 PROCEDURE — 83036 HEMOGLOBIN GLYCOSYLATED A1C: CPT

## 2020-04-24 PROCEDURE — 80053 COMPREHEN METABOLIC PANEL: CPT

## 2020-04-24 PROCEDURE — 85025 COMPLETE CBC W/AUTO DIFF WBC: CPT

## 2020-04-24 PROCEDURE — 83880 ASSAY OF NATRIURETIC PEPTIDE: CPT

## 2020-04-24 PROCEDURE — 36415 COLL VENOUS BLD VENIPUNCTURE: CPT

## 2020-04-24 PROCEDURE — 80061 LIPID PANEL: CPT

## 2020-04-24 PROCEDURE — 84443 ASSAY THYROID STIM HORMONE: CPT

## 2020-04-24 PROCEDURE — 82306 VITAMIN D 25 HYDROXY: CPT

## 2020-04-24 PROCEDURE — 93005 ELECTROCARDIOGRAM TRACING: CPT

## 2020-04-24 PROCEDURE — 93010 ELECTROCARDIOGRAM REPORT: CPT | Performed by: INTERNAL MEDICINE

## 2020-04-29 ENCOUNTER — OFFICE VISIT (OUTPATIENT)
Dept: CARDIOLOGY | Age: 50
End: 2020-04-29

## 2020-04-29 VITALS
HEIGHT: 65 IN | DIASTOLIC BLOOD PRESSURE: 82 MMHG | SYSTOLIC BLOOD PRESSURE: 140 MMHG | BODY MASS INDEX: 48.82 KG/M2 | WEIGHT: 293 LBS | HEART RATE: 86 BPM

## 2020-04-29 DIAGNOSIS — Z51.81 ENCOUNTER FOR MONITORING DIURETIC THERAPY: Primary | ICD-10-CM

## 2020-04-29 DIAGNOSIS — Z79.899 ENCOUNTER FOR MONITORING DIURETIC THERAPY: Primary | ICD-10-CM

## 2020-04-29 PROCEDURE — 99213 OFFICE O/P EST LOW 20 MIN: CPT | Performed by: NURSE PRACTITIONER

## 2020-04-29 RX ORDER — TORSEMIDE 20 MG/1
40 TABLET ORAL DAILY
Qty: 60 TABLET | Refills: 3 | Status: SHIPPED | OUTPATIENT
Start: 2020-04-29 | End: 2020-05-08 | Stop reason: SDUPTHER

## 2020-04-29 RX ORDER — POTASSIUM CHLORIDE 750 MG/1
20 TABLET, FILM COATED, EXTENDED RELEASE ORAL DAILY
Qty: 60 TABLET | Refills: 3 | Status: SHIPPED | OUTPATIENT
Start: 2020-04-29 | End: 2020-05-08 | Stop reason: SDUPTHER

## 2020-04-29 SDOH — HEALTH STABILITY: MENTAL HEALTH: HOW OFTEN DO YOU HAVE A DRINK CONTAINING ALCOHOL?: MONTHLY OR LESS

## 2020-04-29 ASSESSMENT — ENCOUNTER SYMPTOMS
SHORTNESS OF BREATH: 0
COUGH: 0
NIGHT SWEATS: 0
FEVER: 0
ABDOMINAL PAIN: 0
BLOATING: 0
NEAR-SYNCOPE: 0
ORTHOPNEA: 0
SYNCOPE: 0

## 2020-04-30 ENCOUNTER — TELEPHONE (OUTPATIENT)
Dept: CARDIOLOGY | Age: 50
End: 2020-04-30

## 2020-05-08 ENCOUNTER — TELEPHONE (OUTPATIENT)
Dept: CARDIOLOGY | Age: 50
End: 2020-05-08

## 2020-05-08 RX ORDER — POTASSIUM CHLORIDE 750 MG/1
10 TABLET, FILM COATED, EXTENDED RELEASE ORAL DAILY
Qty: 60 TABLET | Refills: 3 | Status: SHIPPED | COMMUNITY
Start: 2020-05-08 | End: 2020-05-28 | Stop reason: SDUPTHER

## 2020-05-08 RX ORDER — TORSEMIDE 20 MG/1
20 TABLET ORAL DAILY
Qty: 60 TABLET | Refills: 3 | Status: SHIPPED | COMMUNITY
Start: 2020-05-08 | End: 2020-05-28 | Stop reason: SDUPTHER

## 2020-05-22 ENCOUNTER — LAB ENCOUNTER (OUTPATIENT)
Dept: LAB | Age: 50
End: 2020-05-22
Attending: INTERNAL MEDICINE
Payer: COMMERCIAL

## 2020-05-22 DIAGNOSIS — Z51.81 ENCOUNTER FOR THERAPEUTIC DRUG MONITORING: Primary | ICD-10-CM

## 2020-05-22 DIAGNOSIS — Z79.899 ENCOUNTER FOR LONG-TERM (CURRENT) USE OF OTHER MEDICATIONS: ICD-10-CM

## 2020-05-22 PROCEDURE — 36415 COLL VENOUS BLD VENIPUNCTURE: CPT

## 2020-05-22 PROCEDURE — 80048 BASIC METABOLIC PNL TOTAL CA: CPT

## 2020-05-28 ENCOUNTER — OFFICE VISIT (OUTPATIENT)
Dept: CARDIOLOGY | Age: 50
End: 2020-05-28

## 2020-05-28 VITALS — BODY MASS INDEX: 50.02 KG/M2 | WEIGHT: 293 LBS | HEIGHT: 64 IN

## 2020-05-28 DIAGNOSIS — M79.662 BILATERAL CALF PAIN: ICD-10-CM

## 2020-05-28 DIAGNOSIS — R60.0 BILATERAL LOWER EXTREMITY EDEMA: ICD-10-CM

## 2020-05-28 DIAGNOSIS — I10 HYPERTENSION, BENIGN: Primary | ICD-10-CM

## 2020-05-28 DIAGNOSIS — R61 DIAPHORESIS: ICD-10-CM

## 2020-05-28 DIAGNOSIS — I34.0 NONRHEUMATIC MITRAL VALVE REGURGITATION: ICD-10-CM

## 2020-05-28 DIAGNOSIS — M79.661 BILATERAL CALF PAIN: ICD-10-CM

## 2020-05-28 PROCEDURE — 99214 OFFICE O/P EST MOD 30 MIN: CPT | Performed by: NURSE PRACTITIONER

## 2020-05-28 RX ORDER — POTASSIUM CHLORIDE 750 MG/1
20 TABLET, FILM COATED, EXTENDED RELEASE ORAL DAILY
Qty: 60 TABLET | Refills: 3 | Status: SHIPPED | COMMUNITY
Start: 2020-05-28

## 2020-05-28 RX ORDER — TORSEMIDE 20 MG/1
30 TABLET ORAL DAILY
Qty: 60 TABLET | Refills: 3 | Status: SHIPPED | COMMUNITY
Start: 2020-05-28

## 2020-05-28 ASSESSMENT — PATIENT HEALTH QUESTIONNAIRE - PHQ9
SUM OF ALL RESPONSES TO PHQ9 QUESTIONS 1 AND 2: 0
2. FEELING DOWN, DEPRESSED OR HOPELESS: NOT AT ALL
SUM OF ALL RESPONSES TO PHQ9 QUESTIONS 1 AND 2: 0
CLINICAL INTERPRETATION OF PHQ9 SCORE: NO FURTHER SCREENING NEEDED
1. LITTLE INTEREST OR PLEASURE IN DOING THINGS: NOT AT ALL
CLINICAL INTERPRETATION OF PHQ2 SCORE: NO FURTHER SCREENING NEEDED
CLINICAL INTERPRETATION OF PHQ2 SCORE: NO FURTHER SCREENING NEEDED
2. FEELING DOWN, DEPRESSED OR HOPELESS: NOT AT ALL
SUM OF ALL RESPONSES TO PHQ9 QUESTIONS 1 AND 2: 0
1. LITTLE INTEREST OR PLEASURE IN DOING THINGS: NOT AT ALL

## 2020-05-28 ASSESSMENT — ENCOUNTER SYMPTOMS
SYNCOPE: 0
BLOATING: 0
ORTHOPNEA: 0
NEAR-SYNCOPE: 0
COUGH: 0
FEVER: 0
ABDOMINAL PAIN: 0
SHORTNESS OF BREATH: 0
NIGHT SWEATS: 0

## 2020-06-16 ENCOUNTER — APPOINTMENT (OUTPATIENT)
Dept: CARDIOLOGY | Age: 50
End: 2020-06-16

## 2020-09-23 PROBLEM — M65.4 RADIAL STYLOID TENOSYNOVITIS: Status: ACTIVE | Noted: 2020-09-23

## 2020-10-12 ENCOUNTER — APPOINTMENT (OUTPATIENT)
Dept: CARDIOLOGY | Age: 50
End: 2020-10-12

## 2020-10-12 ENCOUNTER — APPOINTMENT (OUTPATIENT)
Dept: LAB | Age: 50
End: 2020-10-12
Payer: COMMERCIAL

## 2020-10-12 DIAGNOSIS — G56.01 CARPAL TUNNEL SYNDROME ON RIGHT: ICD-10-CM

## 2020-10-12 PROCEDURE — 93010 ELECTROCARDIOGRAM REPORT: CPT | Performed by: INTERNAL MEDICINE

## 2020-10-12 PROCEDURE — 93005 ELECTROCARDIOGRAM TRACING: CPT

## 2020-10-26 ENCOUNTER — APPOINTMENT (OUTPATIENT)
Dept: LAB | Age: 50
End: 2020-10-26
Attending: ORTHOPAEDIC SURGERY
Payer: COMMERCIAL

## 2020-10-26 DIAGNOSIS — G56.01 CARPAL TUNNEL SYNDROME ON RIGHT: ICD-10-CM

## 2020-10-27 ENCOUNTER — APPOINTMENT (OUTPATIENT)
Dept: CARDIOLOGY | Age: 50
End: 2020-10-27

## 2020-10-27 LAB — SARS-COV-2 RNA RESP QL NAA+PROBE: NOT DETECTED

## 2020-10-29 ENCOUNTER — ANESTHESIA EVENT (OUTPATIENT)
Dept: SURGERY | Facility: HOSPITAL | Age: 50
End: 2020-10-29
Payer: COMMERCIAL

## 2020-10-29 ENCOUNTER — ANESTHESIA (OUTPATIENT)
Dept: SURGERY | Facility: HOSPITAL | Age: 50
End: 2020-10-29
Payer: COMMERCIAL

## 2020-10-29 ENCOUNTER — HOSPITAL ENCOUNTER (OUTPATIENT)
Facility: HOSPITAL | Age: 50
Setting detail: HOSPITAL OUTPATIENT SURGERY
Discharge: HOME | End: 2020-10-29
Attending: ORTHOPAEDIC SURGERY | Admitting: ORTHOPAEDIC SURGERY
Payer: COMMERCIAL

## 2020-10-29 VITALS
WEIGHT: 293 LBS | HEIGHT: 64.5 IN | SYSTOLIC BLOOD PRESSURE: 140 MMHG | BODY MASS INDEX: 49.41 KG/M2 | HEART RATE: 58 BPM | OXYGEN SATURATION: 96 % | TEMPERATURE: 98 F | DIASTOLIC BLOOD PRESSURE: 82 MMHG | RESPIRATION RATE: 18 BRPM

## 2020-10-29 DIAGNOSIS — G56.01 CARPAL TUNNEL SYNDROME ON RIGHT: Primary | ICD-10-CM

## 2020-10-29 DIAGNOSIS — M65.321 TRIGGER INDEX FINGER OF RIGHT HAND: ICD-10-CM

## 2020-10-29 DIAGNOSIS — M65.4 RADIAL STYLOID TENOSYNOVITIS: ICD-10-CM

## 2020-10-29 PROCEDURE — 0LN50ZZ RELEASE RIGHT LOWER ARM AND WRIST TENDON, OPEN APPROACH: ICD-10-PCS | Performed by: ORTHOPAEDIC SURGERY

## 2020-10-29 PROCEDURE — 3E0233Z INTRODUCTION OF ANTI-INFLAMMATORY INTO MUSCLE, PERCUTANEOUS APPROACH: ICD-10-PCS | Performed by: ORTHOPAEDIC SURGERY

## 2020-10-29 RX ORDER — HYDROCODONE BITARTRATE AND ACETAMINOPHEN 5; 325 MG/1; MG/1
1 TABLET ORAL EVERY 6 HOURS PRN
Qty: 10 TABLET | Refills: 0 | Status: SHIPPED | OUTPATIENT
Start: 2020-10-29 | End: 2020-11-24

## 2020-10-29 RX ORDER — SODIUM CHLORIDE, SODIUM LACTATE, POTASSIUM CHLORIDE, CALCIUM CHLORIDE 600; 310; 30; 20 MG/100ML; MG/100ML; MG/100ML; MG/100ML
INJECTION, SOLUTION INTRAVENOUS CONTINUOUS
Status: DISCONTINUED | OUTPATIENT
Start: 2020-10-29 | End: 2020-10-29

## 2020-10-29 RX ORDER — ONDANSETRON 2 MG/ML
INJECTION INTRAMUSCULAR; INTRAVENOUS
Status: COMPLETED
Start: 2020-10-29 | End: 2020-10-29

## 2020-10-29 RX ORDER — DIPHENHYDRAMINE HYDROCHLORIDE 50 MG/ML
12.5 INJECTION INTRAMUSCULAR; INTRAVENOUS AS NEEDED
Status: DISCONTINUED | OUTPATIENT
Start: 2020-10-29 | End: 2020-10-29

## 2020-10-29 RX ORDER — MEPERIDINE HYDROCHLORIDE 25 MG/ML
12.5 INJECTION INTRAMUSCULAR; INTRAVENOUS; SUBCUTANEOUS AS NEEDED
Status: DISCONTINUED | OUTPATIENT
Start: 2020-10-29 | End: 2020-10-29

## 2020-10-29 RX ORDER — METHYLPREDNISOLONE ACETATE 80 MG/ML
INJECTION, SUSPENSION INTRA-ARTICULAR; INTRALESIONAL; INTRAMUSCULAR; SOFT TISSUE AS NEEDED
Status: DISCONTINUED | OUTPATIENT
Start: 2020-10-29 | End: 2020-10-29 | Stop reason: HOSPADM

## 2020-10-29 RX ORDER — ACETAMINOPHEN 500 MG
1000 TABLET ORAL ONCE
Status: DISCONTINUED | OUTPATIENT
Start: 2020-10-29 | End: 2020-10-29 | Stop reason: HOSPADM

## 2020-10-29 RX ORDER — KETOROLAC TROMETHAMINE 30 MG/ML
15 INJECTION, SOLUTION INTRAMUSCULAR; INTRAVENOUS EVERY 6 HOURS PRN
Status: DISCONTINUED | OUTPATIENT
Start: 2020-10-29 | End: 2020-10-29

## 2020-10-29 RX ORDER — NALOXONE HYDROCHLORIDE 0.4 MG/ML
80 INJECTION, SOLUTION INTRAMUSCULAR; INTRAVENOUS; SUBCUTANEOUS AS NEEDED
Status: DISCONTINUED | OUTPATIENT
Start: 2020-10-29 | End: 2020-10-29

## 2020-10-29 RX ORDER — HYDROCODONE BITARTRATE AND ACETAMINOPHEN 5; 325 MG/1; MG/1
1 TABLET ORAL AS NEEDED
Status: DISCONTINUED | OUTPATIENT
Start: 2020-10-29 | End: 2020-10-29

## 2020-10-29 RX ORDER — HYDROMORPHONE HYDROCHLORIDE 1 MG/ML
0.4 INJECTION, SOLUTION INTRAMUSCULAR; INTRAVENOUS; SUBCUTANEOUS EVERY 5 MIN PRN
Status: DISCONTINUED | OUTPATIENT
Start: 2020-10-29 | End: 2020-10-29

## 2020-10-29 RX ORDER — METOCLOPRAMIDE HYDROCHLORIDE 5 MG/ML
10 INJECTION INTRAMUSCULAR; INTRAVENOUS AS NEEDED
Status: DISCONTINUED | OUTPATIENT
Start: 2020-10-29 | End: 2020-10-29

## 2020-10-29 RX ORDER — MIDAZOLAM HYDROCHLORIDE 1 MG/ML
1 INJECTION INTRAMUSCULAR; INTRAVENOUS EVERY 5 MIN PRN
Status: DISCONTINUED | OUTPATIENT
Start: 2020-10-29 | End: 2020-10-29

## 2020-10-29 RX ORDER — MIDAZOLAM HYDROCHLORIDE 1 MG/ML
INJECTION INTRAMUSCULAR; INTRAVENOUS AS NEEDED
Status: DISCONTINUED | OUTPATIENT
Start: 2020-10-29 | End: 2020-10-29 | Stop reason: SURG

## 2020-10-29 RX ORDER — ACETAMINOPHEN 500 MG
1000 TABLET ORAL ONCE AS NEEDED
Status: DISCONTINUED | OUTPATIENT
Start: 2020-10-29 | End: 2020-10-29

## 2020-10-29 RX ORDER — HYDROMORPHONE HYDROCHLORIDE 1 MG/ML
INJECTION, SOLUTION INTRAMUSCULAR; INTRAVENOUS; SUBCUTANEOUS
Status: COMPLETED
Start: 2020-10-29 | End: 2020-10-29

## 2020-10-29 RX ORDER — DEXAMETHASONE SODIUM PHOSPHATE 4 MG/ML
4 VIAL (ML) INJECTION AS NEEDED
Status: DISCONTINUED | OUTPATIENT
Start: 2020-10-29 | End: 2020-10-29

## 2020-10-29 RX ORDER — BUPIVACAINE HYDROCHLORIDE 5 MG/ML
INJECTION, SOLUTION EPIDURAL; INTRACAUDAL AS NEEDED
Status: DISCONTINUED | OUTPATIENT
Start: 2020-10-29 | End: 2020-10-29 | Stop reason: HOSPADM

## 2020-10-29 RX ORDER — ACETAMINOPHEN 500 MG
1000 TABLET ORAL ONCE
COMMUNITY
End: 2020-11-24

## 2020-10-29 RX ORDER — HYDROCODONE BITARTRATE AND ACETAMINOPHEN 5; 325 MG/1; MG/1
2 TABLET ORAL AS NEEDED
Status: DISCONTINUED | OUTPATIENT
Start: 2020-10-29 | End: 2020-10-29

## 2020-10-29 RX ORDER — KETOROLAC TROMETHAMINE 30 MG/ML
INJECTION, SOLUTION INTRAMUSCULAR; INTRAVENOUS
Status: COMPLETED
Start: 2020-10-29 | End: 2020-10-29

## 2020-10-29 RX ORDER — KETOROLAC TROMETHAMINE 30 MG/ML
30 INJECTION, SOLUTION INTRAMUSCULAR; INTRAVENOUS EVERY 6 HOURS PRN
Status: DISCONTINUED | OUTPATIENT
Start: 2020-10-29 | End: 2020-10-29

## 2020-10-29 RX ORDER — ONDANSETRON 2 MG/ML
4 INJECTION INTRAMUSCULAR; INTRAVENOUS AS NEEDED
Status: DISCONTINUED | OUTPATIENT
Start: 2020-10-29 | End: 2020-10-29

## 2020-10-29 RX ADMIN — MIDAZOLAM HYDROCHLORIDE 2 MG: 1 INJECTION INTRAMUSCULAR; INTRAVENOUS at 14:45:00

## 2020-10-29 NOTE — ANESTHESIA POSTPROCEDURE EVALUATION
[facility] Patient Status:  Hospital Outpatient Surgery   Age/Gender 44year old female MRN [id number]   [facility] SURGERY Attending Kristina Abdullahi MD   Hosp Day # 0 PCP Natalia Lombardi MD       Anesthesia Post-op Note

## 2020-10-29 NOTE — ANESTHESIA PREPROCEDURE EVALUATION
PRE-OP EVALUATION    Patient Name: Ghassan Moise    Pre-op Diagnosis: Carpal tunnel syndrome on right [G56.01]  Radial styloid tenosynovitis [M65.4]  Trigger index finger of right hand [M65.321]    Procedure(s):  RIGHT WRIST FIRST EXTENSOR COMPARTMENT RE ENDOSCOPY   • LUMBAR EPIDURAL N/A 2/24/2017    Performed by Natalia Gordon MD at [address] N/A 10/17/2016    Performed by Katia Fox MD at [facility]   • LUMPECTOMY LEFT  1/2006, 1/2009

## 2020-10-29 NOTE — BRIEF OP NOTE
Pre-Operative Diagnosis: Carpal tunnel syndrome on right [G56.01]  Radial styloid tenosynovitis [M65.4]  Trigger index finger of right hand [M65.321]     Post-Operative Diagnosis: Carpal tunnel syndrome on right [G56.01]Radial styloid tenosynovitis [M65.4]

## 2020-10-29 NOTE — H&P
[address] Patient Status:  Hospital Outpatient Surgery    1970 MRN [id number]   Location [address] [place] [address] Attending Korin Rios MD   Hosp Day # 0 PCP Jadyn Ivory MD     History o Laterality Date   • CHOLECYSTECTOMY  1/2001    gallbladder removed   • COLONOSCOPY  10/2014    repeat in 10 years   • COLONOSCOPY N/A 10/8/2014    Performed by Sam Pedersen MD at [facility] ENDOSCOPY   • COLONOSCOPY N/A 10/3/2014    Performed by Luigi Monique Coughing  Pollen                  Runny nose, ITCHING    Home Medications:    •  acetaminophen 500 MG Oral Tab, Take 1,000 mg by mouth one time. , Disp: , Rfl: , 10/29/2020 at 1000    •  FLUoxetine HCl 20 MG Oral Tab, Take 1.5 tablets (30 mg total) by mouth Nausea., Disp: , Rfl: , Past Week at Unknown time    •  Hyoscyamine Sulfate ER 0.375 MG Oral Tablet 12 Hr, TAKE 1 TABLET(0.375 MG) BY MOUTH EVERY 12 HOURS AS NEEDED FOR CRAMPING OR DIARRHEA, Disp: 60 tablet, Rfl: 5, Past Month at Unknown time        Review Transaminitis     Generalized abdominal pain     TAYLOR on CPAP     Essential hypertension     Lumbar radiculitis     Heartburn     Anxiety state     Insomnia     Attention and concentration deficit     Chronic migraine     Hallux limitus of right foot     Mo

## 2020-10-30 NOTE — OPERATIVE REPORT
[facility]    PATIENT'S NAME: Leana Santamaria   ATTENDING PHYSICIAN: Maryruth Babinski, M.D. OPERATING PHYSICIAN: Maryruth Babinski, M.D.    PATIENT ACCOUNT#:   [de-identified]    LOCATION:  [place] [address] ED 10  MEDICAL RECORD #:   [id number]       DATE about the right forearm and the right upper extremity was prepped and draped in standard surgical fashion. A surgical time-out was taken in which the proper patient, surgical site, procedure were verified.   The area surrounding the wrist incision was infi

## 2020-11-03 RX ORDER — FUROSEMIDE 20 MG/1
20 TABLET ORAL DAILY
Qty: 30 TABLET | Refills: 3 | OUTPATIENT
Start: 2020-11-03

## 2020-11-10 ENCOUNTER — APPOINTMENT (OUTPATIENT)
Dept: CARDIOLOGY | Age: 50
End: 2020-11-10

## 2021-01-04 ENCOUNTER — APPOINTMENT (OUTPATIENT)
Dept: CARDIOLOGY | Age: 51
End: 2021-01-04

## 2021-02-23 PROBLEM — F41.9 ANXIETY: Status: RESOLVED | Noted: 2019-05-28 | Resolved: 2021-02-23

## 2021-02-23 PROBLEM — M65.4 RADIAL STYLOID TENOSYNOVITIS: Status: RESOLVED | Noted: 2020-09-23 | Resolved: 2021-02-23

## 2021-02-23 PROBLEM — F32.A DEPRESSIVE DISORDER: Status: RESOLVED | Noted: 2019-05-28 | Resolved: 2021-02-23

## 2021-02-23 PROBLEM — M20.5X1 HALLUX LIMITUS OF RIGHT FOOT: Status: RESOLVED | Noted: 2019-04-11 | Resolved: 2021-02-23

## 2021-02-23 PROBLEM — E66.01 MORBID OBESITY WITH BMI OF 50.0-59.9, ADULT (HCC): Status: RESOLVED | Noted: 2019-05-22 | Resolved: 2021-02-23

## 2021-02-23 PROBLEM — R12 HEARTBURN: Status: RESOLVED | Noted: 2018-08-06 | Resolved: 2021-02-23

## 2021-07-03 ENCOUNTER — HOSPITAL ENCOUNTER (EMERGENCY)
Facility: HOSPITAL | Age: 51
Discharge: HOME | End: 2021-07-03
Attending: STUDENT IN AN ORGANIZED HEALTH CARE EDUCATION/TRAINING PROGRAM
Payer: COMMERCIAL

## 2021-07-03 ENCOUNTER — APPOINTMENT (OUTPATIENT)
Dept: GENERAL RADIOLOGY | Facility: HOSPITAL | Age: 51
End: 2021-07-03
Attending: STUDENT IN AN ORGANIZED HEALTH CARE EDUCATION/TRAINING PROGRAM
Payer: COMMERCIAL

## 2021-07-03 ENCOUNTER — APPOINTMENT (OUTPATIENT)
Dept: CT IMAGING | Facility: HOSPITAL | Age: 51
End: 2021-07-03
Attending: STUDENT IN AN ORGANIZED HEALTH CARE EDUCATION/TRAINING PROGRAM
Payer: COMMERCIAL

## 2021-07-03 VITALS
WEIGHT: 293 LBS | OXYGEN SATURATION: 93 % | HEIGHT: 64 IN | SYSTOLIC BLOOD PRESSURE: 135 MMHG | TEMPERATURE: 98 F | DIASTOLIC BLOOD PRESSURE: 89 MMHG | RESPIRATION RATE: 16 BRPM | HEART RATE: 76 BPM | BODY MASS INDEX: 50.02 KG/M2

## 2021-07-03 DIAGNOSIS — R10.9 ABDOMINAL PAIN, ACUTE: ICD-10-CM

## 2021-07-03 DIAGNOSIS — R11.11 VOMITING WITHOUT NAUSEA, INTRACTABILITY OF VOMITING NOT SPECIFIED, UNSPECIFIED VOMITING TYPE: ICD-10-CM

## 2021-07-03 DIAGNOSIS — I44.7 LEFT BUNDLE BRANCH BLOCK: ICD-10-CM

## 2021-07-03 DIAGNOSIS — G43.909 MIGRAINE WITHOUT STATUS MIGRAINOSUS, NOT INTRACTABLE, UNSPECIFIED MIGRAINE TYPE: Primary | ICD-10-CM

## 2021-07-03 LAB
HAV IGM SER QL: 2 MG/DL (ref 1.6–2.6)
TROPONIN I SERPL-MCNC: <0.045 NG/ML (ref ?–0.04)
TROPONIN I SERPL-MCNC: <0.045 NG/ML (ref ?–0.04)

## 2021-07-03 PROCEDURE — 99285 EMERGENCY DEPT VISIT HI MDM: CPT

## 2021-07-03 PROCEDURE — 96361 HYDRATE IV INFUSION ADD-ON: CPT

## 2021-07-03 PROCEDURE — 84484 ASSAY OF TROPONIN QUANT: CPT | Performed by: STUDENT IN AN ORGANIZED HEALTH CARE EDUCATION/TRAINING PROGRAM

## 2021-07-03 PROCEDURE — 71045 X-RAY EXAM CHEST 1 VIEW: CPT | Performed by: STUDENT IN AN ORGANIZED HEALTH CARE EDUCATION/TRAINING PROGRAM

## 2021-07-03 PROCEDURE — 93005 ELECTROCARDIOGRAM TRACING: CPT

## 2021-07-03 PROCEDURE — 93010 ELECTROCARDIOGRAM REPORT: CPT

## 2021-07-03 PROCEDURE — 96375 TX/PRO/DX INJ NEW DRUG ADDON: CPT

## 2021-07-03 PROCEDURE — 70450 CT HEAD/BRAIN W/O DYE: CPT | Performed by: STUDENT IN AN ORGANIZED HEALTH CARE EDUCATION/TRAINING PROGRAM

## 2021-07-03 PROCEDURE — 83735 ASSAY OF MAGNESIUM: CPT | Performed by: STUDENT IN AN ORGANIZED HEALTH CARE EDUCATION/TRAINING PROGRAM

## 2021-07-03 PROCEDURE — 96376 TX/PRO/DX INJ SAME DRUG ADON: CPT

## 2021-07-03 PROCEDURE — 96374 THER/PROPH/DIAG INJ IV PUSH: CPT

## 2021-07-03 PROCEDURE — 74177 CT ABD & PELVIS W/CONTRAST: CPT | Performed by: STUDENT IN AN ORGANIZED HEALTH CARE EDUCATION/TRAINING PROGRAM

## 2021-07-03 RX ORDER — PROCHLORPERAZINE EDISYLATE 5 MG/ML
10 INJECTION INTRAMUSCULAR; INTRAVENOUS ONCE
Status: COMPLETED | OUTPATIENT
Start: 2021-07-03 | End: 2021-07-03

## 2021-07-03 RX ORDER — ONDANSETRON 4 MG/1
4 TABLET, ORALLY DISINTEGRATING ORAL EVERY 4 HOURS PRN
Qty: 10 TABLET | Refills: 0 | Status: SHIPPED | OUTPATIENT
Start: 2021-07-03 | End: 2021-07-07

## 2021-07-03 RX ORDER — KETOROLAC TROMETHAMINE 30 MG/ML
15 INJECTION, SOLUTION INTRAMUSCULAR; INTRAVENOUS ONCE
Status: COMPLETED | OUTPATIENT
Start: 2021-07-03 | End: 2021-07-03

## 2021-07-03 RX ORDER — DIPHENHYDRAMINE HYDROCHLORIDE 50 MG/ML
25 INJECTION INTRAMUSCULAR; INTRAVENOUS ONCE
Status: COMPLETED | OUTPATIENT
Start: 2021-07-03 | End: 2021-07-03

## 2021-07-03 RX ORDER — BUTALBITAL, ACETAMINOPHEN AND CAFFEINE 50; 325; 40 MG/1; MG/1; MG/1
1-2 TABLET ORAL EVERY 6 HOURS PRN
Qty: 10 TABLET | Refills: 0 | Status: SHIPPED | OUTPATIENT
Start: 2021-07-03 | End: 2021-07-10

## 2021-07-03 RX ORDER — METOCLOPRAMIDE 5 MG/1
5 TABLET ORAL 3 TIMES DAILY PRN
Qty: 20 TABLET | Refills: 0 | Status: SHIPPED | OUTPATIENT
Start: 2021-07-03 | End: 2021-07-12

## 2021-07-03 RX ORDER — METOCLOPRAMIDE HYDROCHLORIDE 5 MG/ML
10 INJECTION INTRAMUSCULAR; INTRAVENOUS ONCE
Status: COMPLETED | OUTPATIENT
Start: 2021-07-03 | End: 2021-07-03

## 2021-07-03 RX ORDER — ACETAMINOPHEN 500 MG
1000 TABLET ORAL ONCE
Status: COMPLETED | OUTPATIENT
Start: 2021-07-03 | End: 2021-07-03

## 2021-07-03 NOTE — ED QUICK NOTES
Round on pt.  Pt sts headache remains the same and nausea is returning  Cont pulse ox remains in place

## 2021-07-03 NOTE — ED INITIAL ASSESSMENT (HPI)
Pt to ED via EMS from [facility] for vomiting, abd pain, and migraine. Pt sts she has a history of migraines. Pt reports vomit over 20x in last 24 hours. Pt received zofran at [facility] and by EMS. Migraine started yesterday. Pt denies and Chest or Back pain.

## 2021-07-04 NOTE — ED PROVIDER NOTES
Patient Seen in: [facility] Emergency Department      History   Patient presents with:  Abdomen/Flank Pain    Stated Complaint: Vomit, abd pain     HPI/Subjective:   HPI    Patient is a 57-year-old female presenting to the emergency department for e migraine   • Hemorrhoids    • History of blood clots 3/2012    legs bilat   • History of depression    • History of miscarriage    • HYPOTHYROIDISM    • IBS (irritable bowel syndrome)    • Insomnia    • Irregular bowel habits    • Leg swelling    • Migrain used    Alcohol use: Yes      Comment: one drink rarely    Drug use: No             Review of Systems    Positive for stated complaint: Vomit, abd pain   Other systems are as noted in HPI. Constitutional and vital signs reviewed.       All other systems re intervals, normal axis, nonspecific ST wave abnormalities noted. When compared with previous EKG performed on 10/12/2020 no significant change identified. EKG sent from immediate care for the patient reviewed.   EKG 14: 07    Rate, intervals a provided as noted below. She was educated on the importance of follow-up and reasons to return to the ER if she were to worsen. She demonstrate understanding, she is comfortable with the plan and follow-up as directed.            Disposition and Plan

## 2021-07-06 ENCOUNTER — TELEPHONE (OUTPATIENT)
Dept: CARDIOLOGY | Age: 51
End: 2021-07-06

## 2021-07-06 LAB
ATRIAL RATE: 76 BPM
P AXIS: 50 DEGREES
P-R INTERVAL: 154 MS
Q-T INTERVAL: 386 MS
QRS DURATION: 86 MS
QTC CALCULATION (BEZET): 434 MS
R AXIS: 23 DEGREES
T AXIS: 72 DEGREES
VENTRICULAR RATE: 76 BPM

## 2021-08-11 NOTE — ED AVS SNAPSHOT
[facility] Emergency Department    [place]  [address] [place] [address]    Phone:  [phone]    Fax:  [phone] [facility]   MRN: [id number]    Department:  [facility] Emergency Department   Date of Visit:  2/17 Medication Information       Follow the directions for taking your medications provided by your doctor. Please ask your health care provider, pharmacist or nurse if you have any questions regarding your home medications, including potential side effects. PAST MEDICAL HISTORY:  No pertinent past medical history      Si usted tiene algun problema con tobin sequimiento, por favor llame a nuestro adminstrador de mj al [phone]    Expect to receive an electronic request (by e-mail or text) to complete a self-assessment the day after your visit.   You may also receiv [facility] [address] ([address]) [address]  ([address]) [address] [facility]. ([address]) [address] [facility] [address] ([address]

## 2021-09-13 ENCOUNTER — LAB ENCOUNTER (OUTPATIENT)
Dept: LAB | Facility: HOSPITAL | Age: 51
End: 2021-09-13
Attending: INTERNAL MEDICINE
Payer: COMMERCIAL

## 2021-09-13 DIAGNOSIS — E03.9 MYXEDEMA HEART DISEASE: Primary | ICD-10-CM

## 2021-09-13 DIAGNOSIS — I51.9 MYXEDEMA HEART DISEASE: Primary | ICD-10-CM

## 2021-09-13 DIAGNOSIS — R73.9 BLOOD GLUCOSE ELEVATED: ICD-10-CM

## 2021-09-13 DIAGNOSIS — I10 ESSENTIAL HYPERTENSION, MALIGNANT: ICD-10-CM

## 2021-09-13 DIAGNOSIS — E78.9 DISORDER OF LIPOPROTEIN AND LIPID METABOLISM: ICD-10-CM

## 2021-09-13 DIAGNOSIS — R06.00 DYSPNEA, PAROXYSMAL NOCTURNAL: ICD-10-CM

## 2021-09-13 DIAGNOSIS — E55.9 VITAMIN D DEFICIENCY: ICD-10-CM

## 2021-09-13 DIAGNOSIS — R73.03 PREDIABETES: ICD-10-CM

## 2021-09-13 LAB
ALBUMIN SERPL-MCNC: 3.1 G/DL (ref 3.4–5)
ALBUMIN/GLOB SERPL: 0.6 {RATIO} (ref 1–2)
ALP LIVER SERPL-CCNC: 130 U/L
ALT SERPL-CCNC: 50 U/L
ANION GAP SERPL CALC-SCNC: 1 MMOL/L (ref 0–18)
AST SERPL-CCNC: 19 U/L (ref 15–37)
BASOPHILS # BLD AUTO: 0.03 X10(3) UL (ref 0–0.2)
BASOPHILS NFR BLD AUTO: 0.4 %
BILIRUB SERPL-MCNC: 0.3 MG/DL (ref 0.1–2)
BUN BLD-MCNC: 10 MG/DL (ref 7–18)
CALCIUM BLD-MCNC: 9.1 MG/DL (ref 8.5–10.1)
CHLORIDE SERPL-SCNC: 105 MMOL/L (ref 98–112)
CHOLEST SMN-MCNC: 220 MG/DL (ref ?–200)
CO2 SERPL-SCNC: 31 MMOL/L (ref 21–32)
CREAT BLD-MCNC: 0.88 MG/DL
EOSINOPHIL # BLD AUTO: 0.19 X10(3) UL (ref 0–0.7)
EOSINOPHIL NFR BLD AUTO: 2.7 %
ERYTHROCYTE [DISTWIDTH] IN BLOOD BY AUTOMATED COUNT: 14.5 %
EST. AVERAGE GLUCOSE BLD GHB EST-MCNC: 123 MG/DL (ref 68–126)
GLOBULIN PLAS-MCNC: 4.9 G/DL (ref 2.8–4.4)
GLUCOSE BLD-MCNC: 91 MG/DL (ref 70–99)
HBA1C MFR BLD HPLC: 5.9 % (ref ?–5.7)
HCT VFR BLD AUTO: 43.8 %
HDLC SERPL-MCNC: 45 MG/DL (ref 40–59)
HGB BLD-MCNC: 13.6 G/DL
IMM GRANULOCYTES # BLD AUTO: 0.03 X10(3) UL (ref 0–1)
IMM GRANULOCYTES NFR BLD: 0.4 %
LDLC SERPL CALC-MCNC: 149 MG/DL (ref ?–100)
LYMPHOCYTES # BLD AUTO: 1.63 X10(3) UL (ref 1–4)
LYMPHOCYTES NFR BLD AUTO: 23.3 %
M PROTEIN MFR SERPL ELPH: 8 G/DL (ref 6.4–8.2)
MAGNESIUM SERPL-MCNC: 1.9 MG/DL (ref 1.6–2.6)
MCH RBC QN AUTO: 26.5 PG (ref 26–34)
MCHC RBC AUTO-ENTMCNC: 31.1 G/DL (ref 31–37)
MCV RBC AUTO: 85.2 FL
MONOCYTES # BLD AUTO: 0.67 X10(3) UL (ref 0.1–1)
MONOCYTES NFR BLD AUTO: 9.6 %
NEUTROPHILS # BLD AUTO: 4.45 X10 (3) UL (ref 1.5–7.7)
NEUTROPHILS # BLD AUTO: 4.45 X10(3) UL (ref 1.5–7.7)
NEUTROPHILS NFR BLD AUTO: 63.6 %
NONHDLC SERPL-MCNC: 175 MG/DL (ref ?–130)
NT-PROBNP SERPL-MCNC: 44 PG/ML (ref ?–125)
OSMOLALITY SERPL CALC.SUM OF ELEC: 283 MOSM/KG (ref 275–295)
PATIENT FASTING Y/N/NP: YES
PATIENT FASTING Y/N/NP: YES
PLATELET # BLD AUTO: 311 10(3)UL (ref 150–450)
POTASSIUM SERPL-SCNC: 4.1 MMOL/L (ref 3.5–5.1)
RBC # BLD AUTO: 5.14 X10(6)UL
SODIUM SERPL-SCNC: 137 MMOL/L (ref 136–145)
TRIGL SERPL-MCNC: 142 MG/DL (ref 30–149)
TSI SER-ACNC: 1.68 MIU/ML (ref 0.36–3.74)
VIT D+METAB SERPL-MCNC: 37.1 NG/ML (ref 30–100)
VLDLC SERPL CALC-MCNC: 27 MG/DL (ref 0–30)
WBC # BLD AUTO: 7 X10(3) UL (ref 4–11)

## 2021-09-13 PROCEDURE — 83036 HEMOGLOBIN GLYCOSYLATED A1C: CPT

## 2021-09-13 PROCEDURE — 80053 COMPREHEN METABOLIC PANEL: CPT

## 2021-09-13 PROCEDURE — 83735 ASSAY OF MAGNESIUM: CPT

## 2021-09-13 PROCEDURE — 84443 ASSAY THYROID STIM HORMONE: CPT

## 2021-09-13 PROCEDURE — 82306 VITAMIN D 25 HYDROXY: CPT

## 2021-09-13 PROCEDURE — 80061 LIPID PANEL: CPT

## 2021-09-13 PROCEDURE — 83880 ASSAY OF NATRIURETIC PEPTIDE: CPT

## 2021-09-13 PROCEDURE — 36415 COLL VENOUS BLD VENIPUNCTURE: CPT

## 2021-09-13 PROCEDURE — 85025 COMPLETE CBC W/AUTO DIFF WBC: CPT

## 2021-09-27 PROBLEM — F33.40 RECURRENT MAJOR DEPRESSIVE DISORDER, IN REMISSION (HCC): Status: ACTIVE | Noted: 2020-03-27

## 2021-09-27 PROBLEM — F33.40 RECURRENT MAJOR DEPRESSIVE DISORDER, IN REMISSION: Status: ACTIVE | Noted: 2020-03-27

## 2022-03-05 ENCOUNTER — HOSPITAL ENCOUNTER (OUTPATIENT)
Dept: LAB | Facility: HOSPITAL | Age: 52
Discharge: HOME | End: 2022-03-05
Attending: INTERNAL MEDICINE
Payer: COMMERCIAL

## 2022-03-05 LAB
ALBUMIN SERPL-MCNC: 3.7 G/DL (ref 3.4–5)
ALBUMIN/GLOB SERPL: 0.8 {RATIO} (ref 1–2)
ALP LIVER SERPL-CCNC: 117 U/L
ALT SERPL-CCNC: 46 U/L
ANION GAP SERPL CALC-SCNC: 3 MMOL/L (ref 0–18)
AST SERPL-CCNC: 20 U/L (ref 15–37)
BILIRUB SERPL-MCNC: 0.4 MG/DL (ref 0.1–2)
BUN BLD-MCNC: 12 MG/DL (ref 7–18)
CALCIUM BLD-MCNC: 9.3 MG/DL (ref 8.5–10.1)
CHLORIDE SERPL-SCNC: 107 MMOL/L (ref 98–112)
CHOLEST SERPL-MCNC: 147 MG/DL (ref ?–200)
CO2 SERPL-SCNC: 30 MMOL/L (ref 21–32)
CREAT BLD-MCNC: 0.85 MG/DL
FASTING PATIENT LIPID ANSWER: YES
FASTING STATUS PATIENT QL REPORTED: YES
GLOBULIN PLAS-MCNC: 4.9 G/DL (ref 2.8–4.4)
HDLC SERPL-MCNC: 51 MG/DL (ref 40–59)
LDLC SERPL CALC-MCNC: 72 MG/DL (ref ?–100)
NONHDLC SERPL-MCNC: 96 MG/DL (ref ?–130)
OSMOLALITY SERPL CALC.SUM OF ELEC: 290 MOSM/KG (ref 275–295)
POTASSIUM SERPL-SCNC: 3.9 MMOL/L (ref 3.5–5.1)
PROT SERPL-MCNC: 8.6 G/DL (ref 6.4–8.2)
SODIUM SERPL-SCNC: 140 MMOL/L (ref 136–145)
TRIGL SERPL-MCNC: 135 MG/DL (ref 30–149)
VLDLC SERPL CALC-MCNC: 21 MG/DL (ref 0–30)

## 2022-03-05 PROCEDURE — 80061 LIPID PANEL: CPT | Performed by: INTERNAL MEDICINE

## 2022-03-05 PROCEDURE — 80053 COMPREHEN METABOLIC PANEL: CPT | Performed by: INTERNAL MEDICINE

## 2022-03-05 PROCEDURE — 36415 COLL VENOUS BLD VENIPUNCTURE: CPT | Performed by: INTERNAL MEDICINE

## 2022-06-27 PROBLEM — R61 DIAPHORESIS: Status: ACTIVE | Noted: 2019-12-17

## 2022-06-27 PROBLEM — I44.7 LEFT BUNDLE BRANCH BLOCK (LBBB): Status: ACTIVE | Noted: 2021-08-10

## 2022-07-26 ENCOUNTER — LAB ENCOUNTER (OUTPATIENT)
Dept: LAB | Facility: HOSPITAL | Age: 52
End: 2022-07-26
Attending: FAMILY MEDICINE
Payer: COMMERCIAL

## 2022-07-26 ENCOUNTER — HOSPITAL ENCOUNTER (OUTPATIENT)
Dept: GENERAL RADIOLOGY | Facility: HOSPITAL | Age: 52
Discharge: HOME | End: 2022-07-26
Attending: FAMILY MEDICINE
Payer: COMMERCIAL

## 2022-07-26 DIAGNOSIS — K59.00 CONSTIPATION, UNSPECIFIED CONSTIPATION TYPE: ICD-10-CM

## 2022-07-26 DIAGNOSIS — R73.9 BLOOD GLUCOSE ELEVATED: ICD-10-CM

## 2022-07-26 DIAGNOSIS — R14.0 BLOATING: ICD-10-CM

## 2022-07-26 DIAGNOSIS — E78.9 DISORDER OF LIPOPROTEIN AND LIPID METABOLISM: ICD-10-CM

## 2022-07-26 DIAGNOSIS — E03.9 MYXEDEMA HEART DISEASE: Primary | ICD-10-CM

## 2022-07-26 DIAGNOSIS — I10 ESSENTIAL HYPERTENSION, MALIGNANT: ICD-10-CM

## 2022-07-26 DIAGNOSIS — I51.9 MYXEDEMA HEART DISEASE: Primary | ICD-10-CM

## 2022-07-26 DIAGNOSIS — I44.7 COMPLETE LEFT BUNDLE BRANCH BLOCK: ICD-10-CM

## 2022-07-26 DIAGNOSIS — I34.0 MITRAL INCOMPETENCE: ICD-10-CM

## 2022-07-26 DIAGNOSIS — R53.82 CHRONIC FATIGUE: ICD-10-CM

## 2022-07-26 LAB
ALBUMIN SERPL-MCNC: 3.7 G/DL (ref 3.4–5)
ALBUMIN/GLOB SERPL: 0.8 {RATIO} (ref 1–2)
ALP LIVER SERPL-CCNC: 128 U/L
ALT SERPL-CCNC: 35 U/L
ANION GAP SERPL CALC-SCNC: 6 MMOL/L (ref 0–18)
AST SERPL-CCNC: 16 U/L (ref 15–37)
BILIRUB SERPL-MCNC: 0.3 MG/DL (ref 0.1–2)
BUN BLD-MCNC: 9 MG/DL (ref 7–18)
CALCIUM BLD-MCNC: 9.4 MG/DL (ref 8.5–10.1)
CHLORIDE SERPL-SCNC: 105 MMOL/L (ref 98–112)
CHOLEST SERPL-MCNC: 176 MG/DL (ref ?–200)
CO2 SERPL-SCNC: 26 MMOL/L (ref 21–32)
CREAT BLD-MCNC: 0.96 MG/DL
FASTING PATIENT LIPID ANSWER: NO
FASTING STATUS PATIENT QL REPORTED: NO
FOLATE SERPL-MCNC: 22.8 NG/ML (ref 8.7–?)
GLOBULIN PLAS-MCNC: 4.7 G/DL (ref 2.8–4.4)
GLUCOSE BLD-MCNC: 124 MG/DL (ref 70–99)
HDLC SERPL-MCNC: 51 MG/DL (ref 40–59)
LDLC SERPL CALC-MCNC: 92 MG/DL (ref ?–100)
NONHDLC SERPL-MCNC: 125 MG/DL (ref ?–130)
NT-PROBNP SERPL-MCNC: 81 PG/ML (ref ?–125)
OSMOLALITY SERPL CALC.SUM OF ELEC: 284 MOSM/KG (ref 275–295)
POTASSIUM SERPL-SCNC: 3.9 MMOL/L (ref 3.5–5.1)
PROT SERPL-MCNC: 8.4 G/DL (ref 6.4–8.2)
SODIUM SERPL-SCNC: 137 MMOL/L (ref 136–145)
T4 FREE SERPL-MCNC: 0.9 NG/DL (ref 0.8–1.7)
TRIGL SERPL-MCNC: 192 MG/DL (ref 30–149)
TSI SER-ACNC: 1.38 MIU/ML (ref 0.36–3.74)
VIT B12 SERPL-MCNC: 602 PG/ML (ref 193–986)
VLDLC SERPL CALC-MCNC: 31 MG/DL (ref 0–30)

## 2022-07-26 PROCEDURE — 80053 COMPREHEN METABOLIC PANEL: CPT

## 2022-07-26 PROCEDURE — 82746 ASSAY OF FOLIC ACID SERUM: CPT

## 2022-07-26 PROCEDURE — 80061 LIPID PANEL: CPT

## 2022-07-26 PROCEDURE — 84443 ASSAY THYROID STIM HORMONE: CPT

## 2022-07-26 PROCEDURE — 83880 ASSAY OF NATRIURETIC PEPTIDE: CPT

## 2022-07-26 PROCEDURE — 86038 ANTINUCLEAR ANTIBODIES: CPT

## 2022-07-26 PROCEDURE — 74019 RADEX ABDOMEN 2 VIEWS: CPT | Performed by: FAMILY MEDICINE

## 2022-07-26 PROCEDURE — 82607 VITAMIN B-12: CPT

## 2022-07-26 PROCEDURE — 36415 COLL VENOUS BLD VENIPUNCTURE: CPT

## 2022-07-26 PROCEDURE — 84439 ASSAY OF FREE THYROXINE: CPT

## 2022-07-29 LAB — ANA SER QL: NEGATIVE

## 2022-09-21 ENCOUNTER — HOSPITAL ENCOUNTER (EMERGENCY)
Facility: HOSPITAL | Age: 52
Discharge: HOME | End: 2022-09-21
Attending: EMERGENCY MEDICINE

## 2022-09-21 ENCOUNTER — APPOINTMENT (OUTPATIENT)
Dept: ULTRASOUND IMAGING | Facility: HOSPITAL | Age: 52
End: 2022-09-21
Attending: EMERGENCY MEDICINE

## 2022-09-21 ENCOUNTER — APPOINTMENT (OUTPATIENT)
Dept: CT IMAGING | Facility: HOSPITAL | Age: 52
End: 2022-09-21
Attending: EMERGENCY MEDICINE

## 2022-09-21 VITALS
TEMPERATURE: 97 F | SYSTOLIC BLOOD PRESSURE: 128 MMHG | RESPIRATION RATE: 18 BRPM | HEART RATE: 57 BPM | OXYGEN SATURATION: 94 % | DIASTOLIC BLOOD PRESSURE: 58 MMHG

## 2022-09-21 DIAGNOSIS — R10.9 ABDOMINAL PAIN OF UNKNOWN ETIOLOGY: Primary | ICD-10-CM

## 2022-09-21 DIAGNOSIS — R19.7 DIARRHEA, UNSPECIFIED TYPE: ICD-10-CM

## 2022-09-21 DIAGNOSIS — K64.9 BLEEDING HEMORRHOID: ICD-10-CM

## 2022-09-21 LAB
ALBUMIN SERPL-MCNC: 3.5 G/DL (ref 3.4–5)
ALBUMIN/GLOB SERPL: 0.8 {RATIO} (ref 1–2)
ALP LIVER SERPL-CCNC: 120 U/L
ALT SERPL-CCNC: 33 U/L
ANION GAP SERPL CALC-SCNC: 3 MMOL/L (ref 0–18)
AST SERPL-CCNC: 23 U/L (ref 15–37)
BASOPHILS # BLD AUTO: 0.02 X10(3) UL (ref 0–0.2)
BASOPHILS NFR BLD AUTO: 0.3 %
BILIRUB SERPL-MCNC: 0.5 MG/DL (ref 0.1–2)
BILIRUB UR QL STRIP.AUTO: NEGATIVE
BUN BLD-MCNC: 7 MG/DL (ref 7–18)
CALCIUM BLD-MCNC: 9 MG/DL (ref 8.5–10.1)
CHLORIDE SERPL-SCNC: 106 MMOL/L (ref 98–112)
CLARITY UR REFRACT.AUTO: CLEAR
CO2 SERPL-SCNC: 30 MMOL/L (ref 21–32)
CREAT BLD-MCNC: 0.88 MG/DL
EOSINOPHIL # BLD AUTO: 0.15 X10(3) UL (ref 0–0.7)
EOSINOPHIL NFR BLD AUTO: 2.2 %
ERYTHROCYTE [DISTWIDTH] IN BLOOD BY AUTOMATED COUNT: 13.8 %
GFR SERPLBLD BASED ON 1.73 SQ M-ARVRAT: 79 ML/MIN/1.73M2 (ref 60–?)
GLOBULIN PLAS-MCNC: 4.6 G/DL (ref 2.8–4.4)
GLUCOSE BLD-MCNC: 95 MG/DL (ref 70–99)
GLUCOSE UR STRIP.AUTO-MCNC: NEGATIVE MG/DL
HCT VFR BLD AUTO: 40.8 %
HGB BLD-MCNC: 13.4 G/DL
IMM GRANULOCYTES # BLD AUTO: 0.02 X10(3) UL (ref 0–1)
IMM GRANULOCYTES NFR BLD: 0.3 %
KETONES UR STRIP.AUTO-MCNC: NEGATIVE MG/DL
LEUKOCYTE ESTERASE UR QL STRIP.AUTO: NEGATIVE
LIPASE SERPL-CCNC: 119 U/L (ref 73–393)
LYMPHOCYTES # BLD AUTO: 1.48 X10(3) UL (ref 1–4)
LYMPHOCYTES NFR BLD AUTO: 22.1 %
MCH RBC QN AUTO: 26.8 PG (ref 26–34)
MCHC RBC AUTO-ENTMCNC: 32.8 G/DL (ref 31–37)
MCV RBC AUTO: 81.6 FL
MONOCYTES # BLD AUTO: 0.78 X10(3) UL (ref 0.1–1)
MONOCYTES NFR BLD AUTO: 11.6 %
NEUTROPHILS # BLD AUTO: 4.26 X10 (3) UL (ref 1.5–7.7)
NEUTROPHILS # BLD AUTO: 4.26 X10(3) UL (ref 1.5–7.7)
NEUTROPHILS NFR BLD AUTO: 63.5 %
NITRITE UR QL STRIP.AUTO: NEGATIVE
OSMOLALITY SERPL CALC.SUM OF ELEC: 286 MOSM/KG (ref 275–295)
PH UR STRIP.AUTO: 7 [PH] (ref 5–8)
PLATELET # BLD AUTO: 261 10(3)UL (ref 150–450)
POTASSIUM SERPL-SCNC: 3.2 MMOL/L (ref 3.5–5.1)
PROT SERPL-MCNC: 8.1 G/DL (ref 6.4–8.2)
PROT UR STRIP.AUTO-MCNC: NEGATIVE MG/DL
RBC # BLD AUTO: 5 X10(6)UL
RBC UR QL AUTO: NEGATIVE
SARS-COV-2 RNA RESP QL NAA+PROBE: NOT DETECTED
SODIUM SERPL-SCNC: 139 MMOL/L (ref 136–145)
SP GR UR STRIP.AUTO: 1.01 (ref 1–1.03)
UROBILINOGEN UR STRIP.AUTO-MCNC: <2 MG/DL
WBC # BLD AUTO: 6.7 X10(3) UL (ref 4–11)

## 2022-09-21 PROCEDURE — 93971 EXTREMITY STUDY: CPT | Performed by: EMERGENCY MEDICINE

## 2022-09-21 PROCEDURE — 99285 EMERGENCY DEPT VISIT HI MDM: CPT

## 2022-09-21 PROCEDURE — 96374 THER/PROPH/DIAG INJ IV PUSH: CPT

## 2022-09-21 PROCEDURE — 81003 URINALYSIS AUTO W/O SCOPE: CPT | Performed by: EMERGENCY MEDICINE

## 2022-09-21 PROCEDURE — 85025 COMPLETE CBC W/AUTO DIFF WBC: CPT | Performed by: EMERGENCY MEDICINE

## 2022-09-21 PROCEDURE — 74177 CT ABD & PELVIS W/CONTRAST: CPT | Performed by: EMERGENCY MEDICINE

## 2022-09-21 PROCEDURE — 96375 TX/PRO/DX INJ NEW DRUG ADDON: CPT

## 2022-09-21 PROCEDURE — 96361 HYDRATE IV INFUSION ADD-ON: CPT

## 2022-09-21 PROCEDURE — 83690 ASSAY OF LIPASE: CPT | Performed by: EMERGENCY MEDICINE

## 2022-09-21 PROCEDURE — 80053 COMPREHEN METABOLIC PANEL: CPT | Performed by: EMERGENCY MEDICINE

## 2022-09-21 PROCEDURE — 96376 TX/PRO/DX INJ SAME DRUG ADON: CPT

## 2022-09-21 RX ORDER — ONDANSETRON 4 MG/1
4 TABLET, ORALLY DISINTEGRATING ORAL EVERY 4 HOURS PRN
Qty: 10 TABLET | Refills: 0 | Status: SHIPPED | OUTPATIENT
Start: 2022-09-21 | End: 2022-09-28

## 2022-09-21 RX ORDER — IOHEXOL 350 MG/ML
100 INJECTION, SOLUTION INTRAVENOUS
Status: COMPLETED | OUTPATIENT
Start: 2022-09-21 | End: 2022-09-21

## 2022-09-21 RX ORDER — DICYCLOMINE HCL 20 MG
20 TABLET ORAL 4 TIMES DAILY PRN
Qty: 30 TABLET | Refills: 0 | Status: SHIPPED | OUTPATIENT
Start: 2022-09-21 | End: 2022-10-21

## 2022-09-21 RX ORDER — HYDROCODONE BITARTRATE AND ACETAMINOPHEN 5; 325 MG/1; MG/1
1-2 TABLET ORAL EVERY 6 HOURS PRN
Qty: 10 TABLET | Refills: 0 | Status: SHIPPED | OUTPATIENT
Start: 2022-09-21 | End: 2022-09-28

## 2022-09-21 RX ORDER — SODIUM CHLORIDE 9 MG/ML
125 INJECTION, SOLUTION INTRAVENOUS CONTINUOUS
Status: DISCONTINUED | OUTPATIENT
Start: 2022-09-21 | End: 2022-09-21

## 2022-09-21 RX ORDER — ONDANSETRON 2 MG/ML
4 INJECTION INTRAMUSCULAR; INTRAVENOUS ONCE
Status: COMPLETED | OUTPATIENT
Start: 2022-09-21 | End: 2022-09-21

## 2022-09-21 RX ORDER — DIPHENHYDRAMINE HYDROCHLORIDE 50 MG/ML
25 INJECTION INTRAMUSCULAR; INTRAVENOUS ONCE
Status: COMPLETED | OUTPATIENT
Start: 2022-09-21 | End: 2022-09-21

## 2022-09-21 RX ORDER — HYDROMORPHONE HYDROCHLORIDE 1 MG/ML
0.5 INJECTION, SOLUTION INTRAMUSCULAR; INTRAVENOUS; SUBCUTANEOUS EVERY 30 MIN PRN
Status: DISCONTINUED | OUTPATIENT
Start: 2022-09-21 | End: 2022-09-21

## 2022-09-21 RX ORDER — POTASSIUM CHLORIDE 20 MEQ/1
40 TABLET, EXTENDED RELEASE ORAL ONCE
Status: COMPLETED | OUTPATIENT
Start: 2022-09-21 | End: 2022-09-21

## 2022-09-21 RX ORDER — HYDROCORTISONE ACETATE SUPPOSITORY 30 MG/1
1 SUPPOSITORY RECTAL 2 TIMES DAILY
Qty: 28 SUPPOSITORY | Refills: 0 | Status: SHIPPED | OUTPATIENT
Start: 2022-09-21 | End: 2022-09-22

## 2022-09-21 NOTE — ED QUICK NOTES
Patient out of department for additional testing at [address] [facility] [address]

## 2022-09-21 NOTE — ED INITIAL ASSESSMENT (HPI)
C/o generalized abdominal pain since last Tuesday with BRB in toilet and when wiping, diarrhea+, states she went to [place] and pain became worse since then.  Hx of colitis

## 2022-10-05 ENCOUNTER — LAB ENCOUNTER (OUTPATIENT)
Dept: LAB | Age: 52
End: 2022-10-05
Attending: EMERGENCY MEDICINE
Payer: COMMERCIAL

## 2022-10-05 DIAGNOSIS — R19.7 DIARRHEA, UNSPECIFIED TYPE: ICD-10-CM

## 2022-10-05 DIAGNOSIS — K52.832 LYMPHOCYTIC COLITIS: ICD-10-CM

## 2022-10-05 PROCEDURE — 87046 STOOL CULTR AEROBIC BACT EA: CPT

## 2022-10-05 PROCEDURE — 87077 CULTURE AEROBIC IDENTIFY: CPT

## 2022-10-05 PROCEDURE — 87427 SHIGA-LIKE TOXIN AG IA: CPT

## 2022-10-05 PROCEDURE — 87493 C DIFF AMPLIFIED PROBE: CPT

## 2022-10-05 PROCEDURE — 87045 FECES CULTURE AEROBIC BACT: CPT

## 2022-10-05 PROCEDURE — 83993 ASSAY FOR CALPROTECTIN FECAL: CPT

## 2022-10-05 PROCEDURE — 82656 EL-1 FECAL QUAL/SEMIQ: CPT

## 2022-10-05 PROCEDURE — 82705 FATS/LIPIDS FECES QUAL: CPT

## 2022-10-06 LAB
C DIFF TOX B STL QL: NEGATIVE
CALPROTECTIN STL-MCNT: 66.3 ΜG/G (ref ?–50)

## 2022-10-08 LAB
NEUTRAL FAT, FECAL: NORMAL
SPLIT FAT, FECAL: NORMAL

## 2022-10-11 LAB — PANCREATIC ELASTASE , FECAL: >800 UG/G

## 2023-03-22 ENCOUNTER — APPOINTMENT (OUTPATIENT)
Dept: GENERAL RADIOLOGY | Facility: HOSPITAL | Age: 53
End: 2023-03-22
Attending: EMERGENCY MEDICINE
Payer: COMMERCIAL

## 2023-03-22 ENCOUNTER — HOSPITAL ENCOUNTER (INPATIENT)
Facility: HOSPITAL | Age: 53
LOS: 1 days | Discharge: HOME | End: 2023-03-23
Attending: EMERGENCY MEDICINE | Admitting: INTERNAL MEDICINE
Payer: COMMERCIAL

## 2023-03-22 ENCOUNTER — APPOINTMENT (OUTPATIENT)
Dept: INTERVENTIONAL RADIOLOGY/VASCULAR | Facility: HOSPITAL | Age: 53
End: 2023-03-22
Attending: INTERNAL MEDICINE
Payer: COMMERCIAL

## 2023-03-22 DIAGNOSIS — I24.9 ACS (ACUTE CORONARY SYNDROME) (HCC): Primary | ICD-10-CM

## 2023-03-22 DIAGNOSIS — I44.7 NEW ONSET LEFT BUNDLE BRANCH BLOCK (LBBB): ICD-10-CM

## 2023-03-22 LAB
BASOPHILS # BLD AUTO: 0.03 X10(3) UL (ref 0–0.2)
BASOPHILS NFR BLD AUTO: 0.3 %
EOSINOPHIL # BLD AUTO: 0.11 X10(3) UL (ref 0–0.7)
EOSINOPHIL NFR BLD AUTO: 1.2 %
ERYTHROCYTE [DISTWIDTH] IN BLOOD BY AUTOMATED COUNT: 15 %
HCT VFR BLD AUTO: 44.6 %
HGB BLD-MCNC: 15.2 G/DL
IMM GRANULOCYTES # BLD AUTO: 0.02 X10(3) UL (ref 0–1)
IMM GRANULOCYTES NFR BLD: 0.2 %
LYMPHOCYTES # BLD AUTO: 1.83 X10(3) UL (ref 1–4)
LYMPHOCYTES NFR BLD AUTO: 19.9 %
MCH RBC QN AUTO: 26.3 PG (ref 26–34)
MCHC RBC AUTO-ENTMCNC: 34.1 G/DL (ref 31–37)
MCV RBC AUTO: 77.3 FL
MONOCYTES # BLD AUTO: 0.83 X10(3) UL (ref 0.1–1)
MONOCYTES NFR BLD AUTO: 9 %
NEUTROPHILS # BLD AUTO: 6.36 X10 (3) UL (ref 1.5–7.7)
NEUTROPHILS # BLD AUTO: 6.36 X10(3) UL (ref 1.5–7.7)
NEUTROPHILS NFR BLD AUTO: 69.4 %
PLATELET # BLD AUTO: 334 10(3)UL (ref 150–450)
RBC # BLD AUTO: 5.77 X10(6)UL
SARS-COV-2 RNA RESP QL NAA+PROBE: NOT DETECTED
WBC # BLD AUTO: 9.2 X10(3) UL (ref 4–11)

## 2023-03-22 PROCEDURE — 84484 ASSAY OF TROPONIN QUANT: CPT | Performed by: EMERGENCY MEDICINE

## 2023-03-22 PROCEDURE — 85730 THROMBOPLASTIN TIME PARTIAL: CPT | Performed by: EMERGENCY MEDICINE

## 2023-03-22 PROCEDURE — 80053 COMPREHEN METABOLIC PANEL: CPT | Performed by: EMERGENCY MEDICINE

## 2023-03-22 PROCEDURE — 93005 ELECTROCARDIOGRAM TRACING: CPT

## 2023-03-22 PROCEDURE — 71045 X-RAY EXAM CHEST 1 VIEW: CPT | Performed by: EMERGENCY MEDICINE

## 2023-03-22 PROCEDURE — 85025 COMPLETE CBC W/AUTO DIFF WBC: CPT | Performed by: EMERGENCY MEDICINE

## 2023-03-22 RX ORDER — MORPHINE SULFATE 4 MG/ML
4 INJECTION, SOLUTION INTRAMUSCULAR; INTRAVENOUS ONCE
Status: COMPLETED | OUTPATIENT
Start: 2023-03-22 | End: 2023-03-22

## 2023-03-22 RX ORDER — MORPHINE SULFATE 4 MG/ML
INJECTION, SOLUTION INTRAMUSCULAR; INTRAVENOUS
Status: COMPLETED
Start: 2023-03-22 | End: 2023-03-22

## 2023-03-22 RX ORDER — NITROGLYCERIN 20 MG/100ML
INJECTION INTRAVENOUS CONTINUOUS
Status: DISCONTINUED | OUTPATIENT
Start: 2023-03-22 | End: 2023-03-22

## 2023-03-22 RX ORDER — VERAPAMIL HYDROCHLORIDE 2.5 MG/ML
INJECTION, SOLUTION INTRAVENOUS
Status: COMPLETED
Start: 2023-03-22 | End: 2023-03-22

## 2023-03-22 RX ORDER — ASPIRIN 81 MG/1
324 TABLET, CHEWABLE ORAL ONCE
Status: COMPLETED | OUTPATIENT
Start: 2023-03-22 | End: 2023-03-22

## 2023-03-22 RX ORDER — HEPARIN SODIUM 1000 [USP'U]/ML
5000 INJECTION, SOLUTION INTRAVENOUS; SUBCUTANEOUS ONCE
Status: COMPLETED | OUTPATIENT
Start: 2023-03-22 | End: 2023-03-23

## 2023-03-22 RX ORDER — METOPROLOL TARTRATE 5 MG/5ML
5 INJECTION INTRAVENOUS ONCE
Status: COMPLETED | OUTPATIENT
Start: 2023-03-22 | End: 2023-03-22

## 2023-03-22 RX ORDER — LIDOCAINE HYDROCHLORIDE 10 MG/ML
INJECTION, SOLUTION EPIDURAL; INFILTRATION; INTRACAUDAL; PERINEURAL
Status: COMPLETED
Start: 2023-03-22 | End: 2023-03-22

## 2023-03-22 RX ORDER — IODIXANOL 320 MG/ML
100 INJECTION, SOLUTION INTRAVASCULAR
Status: COMPLETED | OUTPATIENT
Start: 2023-03-22 | End: 2023-03-23

## 2023-03-22 RX ORDER — HEPARIN SODIUM AND DEXTROSE 10000; 5 [USP'U]/100ML; G/100ML
1000 INJECTION INTRAVENOUS ONCE
Status: DISCONTINUED | OUTPATIENT
Start: 2023-03-22 | End: 2023-03-23

## 2023-03-22 RX ORDER — HEPARIN SODIUM 5000 [USP'U]/ML
INJECTION, SOLUTION INTRAVENOUS; SUBCUTANEOUS
Status: COMPLETED
Start: 2023-03-22 | End: 2023-03-22

## 2023-03-22 RX ORDER — NITROGLYCERIN 0.4 MG/1
0.4 TABLET SUBLINGUAL ONCE
Status: COMPLETED | OUTPATIENT
Start: 2023-03-22 | End: 2023-03-22

## 2023-03-23 ENCOUNTER — APPOINTMENT (OUTPATIENT)
Dept: HEMATOLOGY/ONCOLOGY | Facility: HOSPITAL | Age: 53
End: 2023-03-23
Attending: INTERNAL MEDICINE
Payer: COMMERCIAL

## 2023-03-23 VITALS
SYSTOLIC BLOOD PRESSURE: 154 MMHG | HEART RATE: 85 BPM | BODY MASS INDEX: 54 KG/M2 | TEMPERATURE: 98 F | OXYGEN SATURATION: 97 % | WEIGHT: 293 LBS | DIASTOLIC BLOOD PRESSURE: 76 MMHG | RESPIRATION RATE: 16 BRPM

## 2023-03-23 PROBLEM — I44.7 NEW ONSET LEFT BUNDLE BRANCH BLOCK (LBBB): Status: ACTIVE | Noted: 2023-03-23

## 2023-03-23 PROBLEM — I24.9 ACS (ACUTE CORONARY SYNDROME) (HCC): Status: ACTIVE | Noted: 2023-03-23

## 2023-03-23 LAB
ALBUMIN SERPL-MCNC: 3.7 G/DL (ref 3.4–5)
ALBUMIN/GLOB SERPL: 0.8 {RATIO} (ref 1–2)
ALP LIVER SERPL-CCNC: 127 U/L
ALT SERPL-CCNC: 40 U/L
ANION GAP SERPL CALC-SCNC: 11 MMOL/L (ref 0–18)
ANION GAP SERPL CALC-SCNC: 6 MMOL/L (ref 0–18)
APTT PPP: 25.1 SECONDS (ref 23.3–35.6)
APTT PPP: 25.9 SECONDS (ref 23.3–35.6)
AST SERPL-CCNC: 24 U/L (ref 15–37)
ATRIAL RATE: 103 BPM
BASOPHILS # BLD AUTO: 0.02 X10(3) UL (ref 0–0.2)
BASOPHILS NFR BLD AUTO: 0.3 %
BILIRUB SERPL-MCNC: 0.4 MG/DL (ref 0.1–2)
BUN BLD-MCNC: 7 MG/DL (ref 7–18)
BUN BLD-MCNC: 8 MG/DL (ref 7–18)
CALCIUM BLD-MCNC: 9.4 MG/DL (ref 8.5–10.1)
CALCIUM BLD-MCNC: 9.7 MG/DL (ref 8.5–10.1)
CHLORIDE SERPL-SCNC: 104 MMOL/L (ref 98–112)
CHLORIDE SERPL-SCNC: 107 MMOL/L (ref 98–112)
CO2 SERPL-SCNC: 26 MMOL/L (ref 21–32)
CO2 SERPL-SCNC: 27 MMOL/L (ref 21–32)
CREAT BLD-MCNC: 0.77 MG/DL
CREAT BLD-MCNC: 0.9 MG/DL
EOSINOPHIL # BLD AUTO: 0.11 X10(3) UL (ref 0–0.7)
EOSINOPHIL NFR BLD AUTO: 1.8 %
ERYTHROCYTE [DISTWIDTH] IN BLOOD BY AUTOMATED COUNT: 15.3 %
GFR SERPLBLD BASED ON 1.73 SQ M-ARVRAT: 76 ML/MIN/1.73M2 (ref 60–?)
GFR SERPLBLD BASED ON 1.73 SQ M-ARVRAT: 92 ML/MIN/1.73M2 (ref 60–?)
GLOBULIN PLAS-MCNC: 4.7 G/DL (ref 2.8–4.4)
GLUCOSE BLD-MCNC: 112 MG/DL (ref 70–99)
GLUCOSE BLD-MCNC: 118 MG/DL (ref 70–99)
HCT VFR BLD AUTO: 41.8 %
HGB BLD-MCNC: 13.8 G/DL
IMM GRANULOCYTES # BLD AUTO: 0.02 X10(3) UL (ref 0–1)
IMM GRANULOCYTES NFR BLD: 0.3 %
LYMPHOCYTES # BLD AUTO: 1.27 X10(3) UL (ref 1–4)
LYMPHOCYTES NFR BLD AUTO: 21 %
MCH RBC QN AUTO: 26.2 PG (ref 26–34)
MCHC RBC AUTO-ENTMCNC: 33 G/DL (ref 31–37)
MCV RBC AUTO: 79.5 FL
MONOCYTES # BLD AUTO: 0.55 X10(3) UL (ref 0.1–1)
MONOCYTES NFR BLD AUTO: 9.1 %
NEUTROPHILS # BLD AUTO: 4.09 X10 (3) UL (ref 1.5–7.7)
NEUTROPHILS # BLD AUTO: 4.09 X10(3) UL (ref 1.5–7.7)
NEUTROPHILS NFR BLD AUTO: 67.5 %
OSMOLALITY SERPL CALC.SUM OF ELEC: 289 MOSM/KG (ref 275–295)
OSMOLALITY SERPL CALC.SUM OF ELEC: 291 MOSM/KG (ref 275–295)
P AXIS: 68 DEGREES
P-R INTERVAL: 150 MS
PLATELET # BLD AUTO: 307 10(3)UL (ref 150–450)
POTASSIUM SERPL-SCNC: 3.4 MMOL/L (ref 3.5–5.1)
POTASSIUM SERPL-SCNC: 3.6 MMOL/L (ref 3.5–5.1)
PROT SERPL-MCNC: 8.4 G/DL (ref 6.4–8.2)
Q-T INTERVAL: 396 MS
QRS DURATION: 146 MS
QTC CALCULATION (BEZET): 518 MS
R AXIS: 81 DEGREES
RBC # BLD AUTO: 5.26 X10(6)UL
SODIUM SERPL-SCNC: 140 MMOL/L (ref 136–145)
SODIUM SERPL-SCNC: 141 MMOL/L (ref 136–145)
T AXIS: -2 DEGREES
TROPONIN I HIGH SENSITIVITY: 11 NG/L
VENTRICULAR RATE: 103 BPM
WBC # BLD AUTO: 6.1 X10(3) UL (ref 4–11)

## 2023-03-23 PROCEDURE — 80048 BASIC METABOLIC PNL TOTAL CA: CPT | Performed by: INTERNAL MEDICINE

## 2023-03-23 PROCEDURE — B211YZZ FLUOROSCOPY OF MULTIPLE CORONARY ARTERIES USING OTHER CONTRAST: ICD-10-PCS | Performed by: INTERNAL MEDICINE

## 2023-03-23 PROCEDURE — 4A023N7 MEASUREMENT OF CARDIAC SAMPLING AND PRESSURE, LEFT HEART, PERCUTANEOUS APPROACH: ICD-10-PCS | Performed by: INTERNAL MEDICINE

## 2023-03-23 PROCEDURE — 99152 MOD SED SAME PHYS/QHP 5/>YRS: CPT | Performed by: INTERNAL MEDICINE

## 2023-03-23 PROCEDURE — 93458 L HRT ARTERY/VENTRICLE ANGIO: CPT | Performed by: INTERNAL MEDICINE

## 2023-03-23 PROCEDURE — 94660 CPAP INITIATION&MGMT: CPT

## 2023-03-23 PROCEDURE — 85730 THROMBOPLASTIN TIME PARTIAL: CPT | Performed by: EMERGENCY MEDICINE

## 2023-03-23 PROCEDURE — 85025 COMPLETE CBC W/AUTO DIFF WBC: CPT | Performed by: INTERNAL MEDICINE

## 2023-03-23 PROCEDURE — 94799 UNLISTED PULMONARY SVC/PX: CPT

## 2023-03-23 RX ORDER — MIDAZOLAM HYDROCHLORIDE 1 MG/ML
INJECTION INTRAMUSCULAR; INTRAVENOUS
Status: COMPLETED
Start: 2023-03-23 | End: 2023-03-23

## 2023-03-23 RX ORDER — ROSUVASTATIN CALCIUM 5 MG/1
5 TABLET, COATED ORAL NIGHTLY
Status: DISCONTINUED | OUTPATIENT
Start: 2023-03-23 | End: 2023-03-23

## 2023-03-23 RX ORDER — HEPARIN SODIUM AND DEXTROSE 10000; 5 [USP'U]/100ML; G/100ML
INJECTION INTRAVENOUS CONTINUOUS
Status: DISCONTINUED | OUTPATIENT
Start: 2023-03-23 | End: 2023-03-23

## 2023-03-23 RX ORDER — GABAPENTIN ENACARBIL 600 MG/1
600 TABLET, EXTENDED RELEASE ORAL NIGHTLY
COMMUNITY

## 2023-03-23 RX ORDER — PROCHLORPERAZINE EDISYLATE 5 MG/ML
5 INJECTION INTRAMUSCULAR; INTRAVENOUS EVERY 8 HOURS PRN
Status: DISCONTINUED | OUTPATIENT
Start: 2023-03-23 | End: 2023-03-23

## 2023-03-23 RX ORDER — BACLOFEN 10 MG/1
10 TABLET ORAL NIGHTLY PRN
COMMUNITY

## 2023-03-23 RX ORDER — AMLODIPINE BESYLATE 5 MG/1
5 TABLET ORAL DAILY
COMMUNITY

## 2023-03-23 RX ORDER — AMLODIPINE BESYLATE 5 MG/1
5 TABLET ORAL DAILY
Status: DISCONTINUED | OUTPATIENT
Start: 2023-03-23 | End: 2023-03-23

## 2023-03-23 RX ORDER — MIDAZOLAM HYDROCHLORIDE 1 MG/ML
INJECTION INTRAMUSCULAR; INTRAVENOUS
Status: DISCONTINUED
Start: 2023-03-23 | End: 2023-03-23 | Stop reason: WASHOUT

## 2023-03-23 RX ORDER — ACETAMINOPHEN 500 MG
500 TABLET ORAL EVERY 4 HOURS PRN
Status: DISCONTINUED | OUTPATIENT
Start: 2023-03-23 | End: 2023-03-23

## 2023-03-23 RX ORDER — POTASSIUM CHLORIDE 20 MEQ/1
40 TABLET, EXTENDED RELEASE ORAL EVERY 4 HOURS
Status: DISCONTINUED | OUTPATIENT
Start: 2023-03-23 | End: 2023-03-23

## 2023-03-23 RX ORDER — ONDANSETRON 2 MG/ML
4 INJECTION INTRAMUSCULAR; INTRAVENOUS EVERY 6 HOURS PRN
Status: DISCONTINUED | OUTPATIENT
Start: 2023-03-23 | End: 2023-03-23

## 2023-03-23 RX ORDER — GABAPENTIN ENACARBIL 300 MG/1
300 TABLET, EXTENDED RELEASE ORAL DAILY
COMMUNITY

## 2023-03-23 NOTE — PLAN OF CARE
Received patient at 0730. Alert and Oriented x4. Tele Rhythm NSR with BBB. O2 saturation 96% On room air. Breath sounds clear. Bed is locked and in low position. Call light and personal items within reach. Tylenol given for HA with relief. Pt voiding with no issue. Pt tolerating ambulation well. Right radial site CDI, no drainage or hematoma, bandaid applied. K replacement given is Ok with Dr Francisco Galindo for just one dose of 40 for K 3.6. Reviewed plan of care and patient verbalizes understanding. Problem: Diabetes/Glucose Control  Goal: Glucose maintained within prescribed range  Description: INTERVENTIONS:  - Monitor Blood Glucose as ordered  - Assess for signs and symptoms of hyperglycemia and hypoglycemia  - Administer ordered medications to maintain glucose within target range  - Assess barriers to adequate nutritional intake and initiate nutrition consult as needed  - Instruct patient on self management of diabetes  Outcome: Progressing     Problem: CARDIOVASCULAR - ADULT  Goal: Maintains optimal cardiac output and hemodynamic stability  Description: INTERVENTIONS:  - Monitor vital signs, rhythm, and trends  - Monitor for bleeding, hypotension and signs of decreased cardiac output  - Evaluate effectiveness of vasoactive medications to optimize hemodynamic stability  - Monitor arterial and/or venous puncture sites for bleeding and/or hematoma  - Assess quality of pulses, skin color and temperature  - Assess for signs of decreased coronary artery perfusion - ex.  Angina  - Evaluate fluid balance, assess for edema, trend weights  Outcome: Progressing  Goal: Absence of cardiac arrhythmias or at baseline  Description: INTERVENTIONS:  - Continuous cardiac monitoring, monitor vital signs, obtain 12 lead EKG if indicated  - Evaluate effectiveness of antiarrhythmic and heart rate control medications as ordered  - Initiate emergency measures for life threatening arrhythmias  - Monitor electrolytes and administer replacement therapy as ordered  Outcome: Progressing     Problem: Patient/Family Goals  Goal: Patient/Family Long Term Goal  Description: Patient's Long Term Goal: to go home    Interventions:  - Md to see  - cath recovery  - See additional Care Plan goals for specific interventions  Outcome: Progressing  Goal: Patient/Family Short Term Goal  Description: Patient's Short Term Goal: CP relief    Interventions:   - find ways to relieve stress  - See additional Care Plan goals for specific interventions  Outcome: Progressing

## 2023-03-23 NOTE — PROCEDURES
[address]    Cardiac Catheterization Note    Primary Proceduralist: Alexis Rodriguez MD  Procedure Performed: [facility]  Date of Procedure: 3/23/2023   Indication: Cath alert, MI, LBBB  Estimated blood loss: 10cc  Specimens: None    Consent obtained from the patient and documented in the paper chart, unless verbally obtained in an emergency setting. The benefits and risk of the procedure, including but not limited to infection, bleeding, myocardial infarction, stroke, vascular injury, emergency surgery, renal failure requiring dialysis and death were discussed with the patient. These complications occur in approximately 1-2% of elective procedures, but the risk may be significantly elevated in the setting of acute coronary syndrome, electrical or hemodynamic instability, multivessel disease, reduced LVEF or . The patient consented to any additional procedures performed emergently in order to address a complication or prevent medical deterioration. Viable alternatives were explained to the patient, including continuing medical therapy, with the risks incurred along that course. Procedure/Technique:    Lidocaine 1% was administered locally. Access of right radial artery obtained using Seldinger technique. Ultrasound was not used. 6 Fr Sheath was inserted. J-wire advanced into the aorta under fluoroscopy. Left coronary system engaged using 6 Fr TIG. Selective angiogram performed. Right coronary system engaged using 6 Fr TIG. Selective angiogram performed. 6 Fr pigtail catheter advanced into the LV. Hemodynamics were obtained. Coronary Angiogram Findings:  1. LM: Large caliber artery giving rise to LAD & LCX. No significant stenosis. 2. LAD: Large caliber artery giving rise to diagonal and septal branches. No significant stenosis. 3. LCX: Medium to large caliber artery giving rise to OM branches. No significant stenosis. Edilberto Perez   4. RCA: Large caliber artery giving rise to acute marginal branches, and bifurcates into RPDA & RPL. No significant stenosis. . Right dominant circulation    Hemodynamics:  /42, LVEDP 18  /95, mean 111  No significant gradient upon Ao-LV pullback  LVEF 60%    Intervention:  None    Monitored sedation administered by the cath lab RN, and supervised throughout the procedure by myself. Total time 13 minutes. Closure: Radial band. No immediate complications. A/P:  1. Normal coronary angiogram. No significant stenosis of LM, LAD, LCX, RCA. Right dominant circulation. 2. Preserved LVEF.  LVEDP 18.  2. Recommend BP management      Lorene Chao MD  Interventional Cardiology  [address] [place] [address]

## 2023-03-23 NOTE — PLAN OF CARE
Assumed care at 0100. Pt is A&Ox4. Pt is on CPAP with , sats maintaining >90%. NSR w/ LBBB on tele, VSS. C/o chest pain, reproducible. Continent of B&B. Pt resting in bed post cath, TR band in place, soft, no hematoma. Plan of care reviewed with patient, verbalizes understanding, all needs addressed at this time, pt seems to be resting comfortably. Problem: Diabetes/Glucose Control  Goal: Glucose maintained within prescribed range  Description: INTERVENTIONS:  - Monitor Blood Glucose as ordered  - Assess for signs and symptoms of hyperglycemia and hypoglycemia  - Administer ordered medications to maintain glucose within target range  - Assess barriers to adequate nutritional intake and initiate nutrition consult as needed  - Instruct patient on self management of diabetes  3/23/2023 0240 by Vera Person RN  Outcome: Progressing  3/23/2023 0240 by Vera Person RN  Outcome: Progressing     Problem: CARDIOVASCULAR - ADULT  Goal: Maintains optimal cardiac output and hemodynamic stability  Description: INTERVENTIONS:  - Monitor vital signs, rhythm, and trends  - Monitor for bleeding, hypotension and signs of decreased cardiac output  - Evaluate effectiveness of vasoactive medications to optimize hemodynamic stability  - Monitor arterial and/or venous puncture sites for bleeding and/or hematoma  - Assess quality of pulses, skin color and temperature  - Assess for signs of decreased coronary artery perfusion - ex.  Angina  - Evaluate fluid balance, assess for edema, trend weights  3/23/2023 0240 by Vera Person RN  Outcome: Progressing  3/23/2023 0240 by Vera Person RN  Outcome: Progressing     Problem: Patient/Family Goals  Goal: Patient/Family Long Term Goal  Description: Patient's Long Term Goal: to go home    Interventions:  - Md to see  - cath recovery  - See additional Care Plan goals for specific interventions  3/23/2023 0240 by Vera Person RN  Outcome: Progressing  3/23/2023 0240 by Gabriella Soto RN  Outcome: Progressing  Goal: Patient/Family Short Term Goal  Description: Patient's Short Term Goal: CP relief    Interventions:   - find ways to relieve stress  - See additional Care Plan goals for specific interventions  3/23/2023 0240 by Gabriella Soto RN  Outcome: Progressing  3/23/2023 0240 by Gabriella Soto RN  Outcome: Progressing

## 2023-03-23 NOTE — PLAN OF CARE
Notified by Rn that pt's home medication list is updated. And med reconcilliation need to be completed. Cath report reviewed.    Okay stop heparin gtt

## 2023-03-23 NOTE — PROGRESS NOTES
03/23/23 0030   Clinical Encounter Type   Visited With Patient and family together   Continue Visiting No   Crisis Visit ED  (Cardiac Alert)   Patient's Supportive Strategies/Resources family, norman (requested  prayer for her)   Referral From Nurse   Referral To    Denominational Encounters   Denominational Needs Prayer   Family Spiritual Encounters   Family Coping Anxiety; Fearful   Taxonomy   Intended Effects Helping someone feel comforted   Methods Offer emotional support; Offer spiritual/Nondenominational support   Interventions Acknowledge response to difficult experience;Prayer for healing;Provide compassionate touch;Provide hospitality; Respond as  to a defined crisis event; Share words of hope and inspiration;Silent prayer      provided comfort, support, and prayer to [employer] while tests being run and while prepping for the Cath Lab.  also provided support for Jennifer's two children, Indira Suarez and Nata Ramos, and accompanied them up to Cath Lab waiting room and got them situated. Jennifer's  is home and ill and will not be coming to [facility]. No one else is expected. They will all be kept in 's prayer tonight. Spiritual Care support can be requested via an Epic consult.

## 2023-04-06 ENCOUNTER — TELEPHONE (OUTPATIENT)
Dept: HEMATOLOGY/ONCOLOGY | Facility: HOSPITAL | Age: 53
End: 2023-04-06

## 2023-04-06 DIAGNOSIS — R76.8 ELEVATED SERUM IMMUNOGLOBULIN FREE LIGHT CHAIN LEVEL: Primary | ICD-10-CM

## 2023-04-10 NOTE — TELEPHONE ENCOUNTER
Spoke to [place]. Was referred given abnormal light chains and that she was told she may have myeloma. But no monoclonal protein seen on SPEP or NUPUR and therefore myeloma is unlikely. I did recommend she complete her work-up with a 24 hour urine collection which I ordered. Pending results will decide about follow-up. She verbalized understanding.

## 2023-04-11 ENCOUNTER — LAB ENCOUNTER (OUTPATIENT)
Dept: LAB | Age: 53
End: 2023-04-11
Attending: INTERNAL MEDICINE
Payer: COMMERCIAL

## 2023-04-11 DIAGNOSIS — R76.8 ELEVATED SERUM IMMUNOGLOBULIN FREE LIGHT CHAIN LEVEL: ICD-10-CM

## 2023-04-12 LAB
M PROTEIN 24H UR ELPH-MRATE: 81.4 MG/24 HR (ref ?–149.1)
SPECIMEN VOL UR: 1100 ML

## 2023-04-12 PROCEDURE — 86335 IMMUNFIX E-PHORSIS/URINE/CSF: CPT

## 2023-04-12 PROCEDURE — 84166 PROTEIN E-PHORESIS/URINE/CSF: CPT

## 2023-04-12 PROCEDURE — 84156 ASSAY OF PROTEIN URINE: CPT

## 2023-04-26 ENCOUNTER — ORDER TRANSCRIPTION (OUTPATIENT)
Dept: ADMINISTRATIVE | Facility: HOSPITAL | Age: 53
End: 2023-04-26

## 2023-04-26 DIAGNOSIS — Z86.718 HISTORY OF BLOOD CLOTS: Primary | ICD-10-CM

## 2023-04-26 DIAGNOSIS — I44.7 BUNDLE BRANCH BLOCK, LEFT: Primary | ICD-10-CM

## 2023-04-26 DIAGNOSIS — R07.89 CHEST DISCOMFORT: ICD-10-CM

## 2023-04-26 DIAGNOSIS — R06.00 DYSPNEA: ICD-10-CM

## 2023-04-27 ENCOUNTER — OFFICE VISIT (OUTPATIENT)
Dept: HEMATOLOGY/ONCOLOGY | Facility: HOSPITAL | Age: 53
End: 2023-04-27
Attending: INTERNAL MEDICINE
Payer: COMMERCIAL

## 2023-04-27 VITALS
HEART RATE: 101 BPM | WEIGHT: 293 LBS | SYSTOLIC BLOOD PRESSURE: 147 MMHG | TEMPERATURE: 97 F | BODY MASS INDEX: 54 KG/M2 | OXYGEN SATURATION: 96 % | RESPIRATION RATE: 20 BRPM | DIASTOLIC BLOOD PRESSURE: 87 MMHG

## 2023-04-27 DIAGNOSIS — R89.9 ABNORMAL LABORATORY TEST: ICD-10-CM

## 2023-04-27 DIAGNOSIS — Z86.718 HISTORY OF DVT (DEEP VEIN THROMBOSIS): ICD-10-CM

## 2023-04-27 DIAGNOSIS — R76.8 ELEVATED SERUM IMMUNOGLOBULIN FREE LIGHT CHAIN LEVEL: Primary | ICD-10-CM

## 2023-04-27 PROCEDURE — 99245 OFF/OP CONSLTJ NEW/EST HI 55: CPT | Performed by: INTERNAL MEDICINE

## 2023-04-27 NOTE — PATIENT INSTRUCTIONS
For triage nurse: [phone] Monday through Friday 7:30-5:00.  *Please note this is a new phone number*    After hours or weekends for emergent needs:  [phone].      To schedule diagnostic testing: Central [phone]    For Medical Records: [phone]

## 2023-05-08 ENCOUNTER — APPOINTMENT (OUTPATIENT)
Dept: HEMATOLOGY/ONCOLOGY | Facility: HOSPITAL | Age: 53
End: 2023-05-08
Payer: COMMERCIAL

## 2023-06-16 ENCOUNTER — HOSPITAL ENCOUNTER (OUTPATIENT)
Dept: CT IMAGING | Facility: HOSPITAL | Age: 53
Discharge: HOME | End: 2023-06-16
Attending: INTERNAL MEDICINE
Payer: COMMERCIAL

## 2023-06-16 DIAGNOSIS — Z86.718 HISTORY OF BLOOD CLOTS: ICD-10-CM

## 2023-06-16 LAB
CREAT BLD-MCNC: 0.7 MG/DL
GFR SERPLBLD BASED ON 1.73 SQ M-ARVRAT: 103 ML/MIN/1.73M2 (ref 60–?)

## 2023-06-16 PROCEDURE — 71260 CT THORAX DX C+: CPT | Performed by: INTERNAL MEDICINE

## 2023-06-16 PROCEDURE — 82565 ASSAY OF CREATININE: CPT

## 2023-06-29 ENCOUNTER — HOSPITAL ENCOUNTER (OUTPATIENT)
Dept: MRI IMAGING | Age: 53
Discharge: HOME | End: 2023-06-29
Attending: INTERNAL MEDICINE

## 2023-06-29 DIAGNOSIS — R07.89 CHEST DISCOMFORT: ICD-10-CM

## 2023-06-29 DIAGNOSIS — I44.7 BUNDLE BRANCH BLOCK, LEFT: ICD-10-CM

## 2023-06-29 DIAGNOSIS — R06.00 DYSPNEA: ICD-10-CM

## 2023-06-29 PROCEDURE — 75561 CARDIAC MRI FOR MORPH W/DYE: CPT

## 2023-06-29 PROCEDURE — 75561 CARDIAC MRI FOR MORPH W/DYE: CPT | Performed by: INTERNAL MEDICINE

## 2023-06-29 PROCEDURE — 10002805 HB CONTRAST AGENT

## 2023-06-29 PROCEDURE — A9585 GADOBUTROL INJECTION: HCPCS

## 2023-06-29 PROCEDURE — G1004 CDSM NDSC: HCPCS | Performed by: INTERNAL MEDICINE

## 2023-06-29 PROCEDURE — G1004 CDSM NDSC: HCPCS

## 2023-06-29 RX ORDER — GADOBUTROL 604.72 MG/ML
30 INJECTION INTRAVENOUS ONCE
Status: COMPLETED | OUTPATIENT
Start: 2023-06-29 | End: 2023-06-29

## 2023-06-29 RX ADMIN — GADOBUTROL 30 ML: 604.72 INJECTION INTRAVENOUS at 09:17

## 2023-09-18 ENCOUNTER — OFFICE VISIT (OUTPATIENT)
Facility: CLINIC | Age: 53
End: 2023-09-18
Payer: COMMERCIAL

## 2023-09-18 VITALS
DIASTOLIC BLOOD PRESSURE: 74 MMHG | SYSTOLIC BLOOD PRESSURE: 118 MMHG | RESPIRATION RATE: 16 BRPM | HEIGHT: 64 IN | BODY MASS INDEX: 50.02 KG/M2 | HEART RATE: 104 BPM | OXYGEN SATURATION: 94 % | WEIGHT: 293 LBS

## 2023-09-18 DIAGNOSIS — R91.8 PULMONARY INFILTRATE: Primary | ICD-10-CM

## 2023-09-18 PROCEDURE — 3074F SYST BP LT 130 MM HG: CPT | Performed by: INTERNAL MEDICINE

## 2023-09-18 PROCEDURE — 99204 OFFICE O/P NEW MOD 45 MIN: CPT | Performed by: INTERNAL MEDICINE

## 2023-09-18 PROCEDURE — 3078F DIAST BP <80 MM HG: CPT | Performed by: INTERNAL MEDICINE

## 2023-09-18 PROCEDURE — 3008F BODY MASS INDEX DOCD: CPT | Performed by: INTERNAL MEDICINE

## 2023-09-20 PROBLEM — M79.605 BILATERAL LEG PAIN: Status: ACTIVE | Noted: 2023-04-25

## 2023-09-20 PROBLEM — R22.43 LOCALIZED SWELLING OF BOTH LOWER LEGS: Status: ACTIVE | Noted: 2023-04-25

## 2023-09-20 PROBLEM — M79.604 BILATERAL LEG PAIN: Status: ACTIVE | Noted: 2023-04-25

## 2023-09-23 ENCOUNTER — HOSPITAL ENCOUNTER (OUTPATIENT)
Dept: CT IMAGING | Facility: HOSPITAL | Age: 53
Discharge: HOME | End: 2023-09-23
Attending: INTERNAL MEDICINE
Payer: COMMERCIAL

## 2023-09-23 DIAGNOSIS — R91.8 PULMONARY INFILTRATE: ICD-10-CM

## 2023-09-23 PROCEDURE — 71250 CT THORAX DX C-: CPT | Performed by: INTERNAL MEDICINE

## 2023-10-02 ENCOUNTER — ANESTHESIA (OUTPATIENT)
Dept: ENDOSCOPY | Facility: HOSPITAL | Age: 53
End: 2023-10-02
Payer: COMMERCIAL

## 2023-10-02 ENCOUNTER — HOSPITAL ENCOUNTER (OUTPATIENT)
Facility: HOSPITAL | Age: 53
Setting detail: HOSPITAL OUTPATIENT SURGERY
Discharge: HOME | End: 2023-10-02
Attending: STUDENT IN AN ORGANIZED HEALTH CARE EDUCATION/TRAINING PROGRAM | Admitting: STUDENT IN AN ORGANIZED HEALTH CARE EDUCATION/TRAINING PROGRAM
Payer: COMMERCIAL

## 2023-10-02 ENCOUNTER — ANESTHESIA EVENT (OUTPATIENT)
Dept: ENDOSCOPY | Facility: HOSPITAL | Age: 53
End: 2023-10-02
Payer: COMMERCIAL

## 2023-10-02 VITALS
HEIGHT: 64 IN | SYSTOLIC BLOOD PRESSURE: 155 MMHG | BODY MASS INDEX: 50.02 KG/M2 | DIASTOLIC BLOOD PRESSURE: 88 MMHG | HEART RATE: 84 BPM | WEIGHT: 293 LBS | OXYGEN SATURATION: 97 % | TEMPERATURE: 98 F | RESPIRATION RATE: 18 BRPM

## 2023-10-02 DIAGNOSIS — R74.8 ELEVATED ALKALINE PHOSPHATASE LEVEL: ICD-10-CM

## 2023-10-02 DIAGNOSIS — R10.33 PERIUMBILICAL PAIN: ICD-10-CM

## 2023-10-02 DIAGNOSIS — K21.9 GASTROESOPHAGEAL REFLUX DISEASE WITHOUT ESOPHAGITIS: ICD-10-CM

## 2023-10-02 DIAGNOSIS — K59.04 CHRONIC IDIOPATHIC CONSTIPATION: ICD-10-CM

## 2023-10-02 DIAGNOSIS — R13.19 ESOPHAGEAL DYSPHAGIA: ICD-10-CM

## 2023-10-02 DIAGNOSIS — K62.5 BRIGHT RED BLOOD PER RECTUM: ICD-10-CM

## 2023-10-02 DIAGNOSIS — R10.31 BILATERAL LOWER ABDOMINAL PAIN: ICD-10-CM

## 2023-10-02 DIAGNOSIS — R74.01 ELEVATED ALT MEASUREMENT: ICD-10-CM

## 2023-10-02 DIAGNOSIS — R10.32 BILATERAL LOWER ABDOMINAL PAIN: ICD-10-CM

## 2023-10-02 DIAGNOSIS — R11.0 NAUSEA: ICD-10-CM

## 2023-10-02 PROCEDURE — 0DB28ZX EXCISION OF MIDDLE ESOPHAGUS, VIA NATURAL OR ARTIFICIAL OPENING ENDOSCOPIC, DIAGNOSTIC: ICD-10-PCS | Performed by: STUDENT IN AN ORGANIZED HEALTH CARE EDUCATION/TRAINING PROGRAM

## 2023-10-02 PROCEDURE — 0DBB8ZX EXCISION OF ILEUM, VIA NATURAL OR ARTIFICIAL OPENING ENDOSCOPIC, DIAGNOSTIC: ICD-10-PCS | Performed by: STUDENT IN AN ORGANIZED HEALTH CARE EDUCATION/TRAINING PROGRAM

## 2023-10-02 PROCEDURE — 88305 TISSUE EXAM BY PATHOLOGIST: CPT | Performed by: STUDENT IN AN ORGANIZED HEALTH CARE EDUCATION/TRAINING PROGRAM

## 2023-10-02 PROCEDURE — 0DB98ZX EXCISION OF DUODENUM, VIA NATURAL OR ARTIFICIAL OPENING ENDOSCOPIC, DIAGNOSTIC: ICD-10-PCS | Performed by: STUDENT IN AN ORGANIZED HEALTH CARE EDUCATION/TRAINING PROGRAM

## 2023-10-02 PROCEDURE — 0DBE8ZX EXCISION OF LARGE INTESTINE, VIA NATURAL OR ARTIFICIAL OPENING ENDOSCOPIC, DIAGNOSTIC: ICD-10-PCS | Performed by: STUDENT IN AN ORGANIZED HEALTH CARE EDUCATION/TRAINING PROGRAM

## 2023-10-02 PROCEDURE — 0DB78ZX EXCISION OF STOMACH, PYLORUS, VIA NATURAL OR ARTIFICIAL OPENING ENDOSCOPIC, DIAGNOSTIC: ICD-10-PCS | Performed by: STUDENT IN AN ORGANIZED HEALTH CARE EDUCATION/TRAINING PROGRAM

## 2023-10-02 PROCEDURE — 0DB38ZX EXCISION OF LOWER ESOPHAGUS, VIA NATURAL OR ARTIFICIAL OPENING ENDOSCOPIC, DIAGNOSTIC: ICD-10-PCS | Performed by: STUDENT IN AN ORGANIZED HEALTH CARE EDUCATION/TRAINING PROGRAM

## 2023-10-02 RX ORDER — CHLORTHALIDONE 25 MG/1
25 TABLET ORAL DAILY
COMMUNITY

## 2023-10-02 RX ORDER — SODIUM CHLORIDE, SODIUM LACTATE, POTASSIUM CHLORIDE, CALCIUM CHLORIDE 600; 310; 30; 20 MG/100ML; MG/100ML; MG/100ML; MG/100ML
INJECTION, SOLUTION INTRAVENOUS CONTINUOUS
Status: DISCONTINUED | OUTPATIENT
Start: 2023-10-02 | End: 2023-10-02

## 2023-10-02 RX ADMIN — SODIUM CHLORIDE, SODIUM LACTATE, POTASSIUM CHLORIDE, CALCIUM CHLORIDE: 600; 310; 30; 20 INJECTION, SOLUTION INTRAVENOUS at 10:00:00

## 2023-10-02 RX ADMIN — SODIUM CHLORIDE, SODIUM LACTATE, POTASSIUM CHLORIDE, CALCIUM CHLORIDE: 600; 310; 30; 20 INJECTION, SOLUTION INTRAVENOUS at 10:54:00

## 2023-10-02 NOTE — H&P
History & Physical Examination    Patient Name: Tavon Mccracken  MRN: [id number]  CSN: [id number]  YOB: 1970    Diagnosis: Abdominal discomfort, IBS, history of lymphocytic colitis, Dysphagia    Present Illness: 44 y/o F history of dysphagia, IBS, abdominal discomfort, and prior lymphocytic colitis presents for EGD and colonoscopy. No medications prior to admission. No current facility-administered medications for this encounter.       Allergies:   Dust                    Runny nose, ITCHING  Losartan                Coughing  Pollen                  Runny nose, ITCHING    Past Medical History:   Diagnosis Date    Abdominal distention 2010    Abdominal pain     Anemia 2017    Have had anemia several times    Anxiety 2005    Arthritis     Autoimmune disease ([facility] [address]     Back pain     Back problem     low back pain    Bad breath 2010    Belching 2000    Bloating     Blurred vision 2020    Chest pain 2018    Chest pain on exertion 2020    Constipation     DEPRESSION     Depression     Diarrhea, unspecified     Dizziness 2019    DVT (deep venous thrombosis) (HCC)     bilateral    Easy bruising 2020    Endocrine disorder     Esophageal reflux     Essential hypertension     Essential hypertension, benign 06/25/2012    Exposure to TB 1997    Fatigue     Feeling lonely 2005    Fibromyalgia     joint pain aches pain stiffness    Flatulence/gas pain/belching     Food intolerance 2006    Off and on    Frequent urination 2000    Gastritis     GERD     Headache disorder     migraines, injections    HEADACHES     migraine    Hearing loss 2014    Heartburn 2010    Heavy menses 1984    No periods since ablation in 2014    Hemorrhoids     High cholesterol 2010    History of blood clots 03/2012    legs bilat    History of depression     History of mental disorder     History of miscarriage     Hoarseness, chronic 2016    Hx of motion sickness     HYPOTHYROIDISM     IBS (irritable bowel syndrome)     Indigestion 2008 Insomnia     Irregular bowel habits     Itch of skin     Leaking of urine 2005    Leg swelling     Loss of appetite     Menses painful 1984    Had ablation 2014    Migraines     Nausea     Night sweats     OA (osteoarthritis)     right knee    Obesity     TAYLOR (obstructive sleep apnea)     cpap    Osteoarthrosis, unspecified whether generalized or localized, unspecified site     Pain in joints     Pain with bowel movements     Phlebitis and thrombophlebitis of other deep vessels of lower extremities 06/25/2012    Popliteal synovial cyst 06/25/2012    Post partum depression     Reflux esophagitis 06/25/2012    RLS (restless legs syndrome)     Skin blushing/flushing 2020    Sleep apnea     Sleep disturbance     Stool incontinence 2021    Sonetimes urge comes on very quickly    Stress 2000    Thyroid disease 1999    Had diagnosis of hypothyroid, and then Hyperthyroid and now    Uncomfortable fullness after meals     Unspecified sleep apnea     Visual impairment     glasses    Wears glasses     Weight gain 2010    Weight loss 2010     Past Surgical History:   Procedure Laterality Date    CHOLECYSTECTOMY  1/2001    gallbladder removed    COLONOSCOPY  8/14/2013    Procedure: COLONOSCOPY;  Surgeon: Hussain Gaytan MD;  Location: [address] [place] [address] ENDOSCOPY    COLONOSCOPY  10/3/2014    Procedure: COLONOSCOPY;  Surgeon: Jonny White MD;  Location: [address] [place] [address]    COLONOSCOPY  10/2014    repeat in 10 years    COLONOSCOPY N/A 10/8/2014    Procedure: COLONOSCOPY;  Surgeon: Shannon Crooks MD;  Location: [address] [place] [address] ENDOSCOPY    D & C      x3    DILATION/CURETTAGE,DIAGNOSTIC      ENDOMETRIAL ABLATION      LUMPECTOMY LEFT  1/2006, 1/2009    OTHER SURGICAL HISTORY      benign lump removal    OTHER SURGICAL HISTORY  6/13    colonoscopy    OTHER SURGICAL HISTORY      upper endoscopy    OTHER SURGICAL HISTORY N/A 6/4/2015    Procedure: ESOPHAGOGASTRODUODENOSCOPY (EGD);   Surgeon: Talita Vasquez MD;  Location: [address] [place] [address]     Family History Problem Relation Age of Onset    Heart Disorder Father         stents    Alcohol and Other Disorders Associated Father         [place] not stankey    Other (Other) Father     Other (CAD) Father         s    Heart Attack Father     Psychiatric Mother     Arthritis Mother         RA    Bipolar Disorder Mother         valium; lithium; multiple    Suicide History Mother         atttempts    Hypertension Mother     Stroke Mother         Two strokes    Mental Disorder Mother     Anxiety Sister     Depression Sister     Other (thyroid) Sister     Anxiety Daughter     Depression Daughter         zoloft    Other (impulse control do/adhd?) Son     Heart Disorder Other         paternal    Bipolar Disorder Other     Depression Other         maternal    ADHD Other     Anxiety Other      Social History    Tobacco Use      Smoking status: Never      Smokeless tobacco: Never    Alcohol use: Not Currently      Comment: one drink rarely      SYSTEM Check if Review is Normal Check if Physical Exam is Normal If not normal, please explain:   HEENT [ x] [x ]    NECK & BACK [ x] [ x]    HEART [ x] [x ]    LUNGS [ x] [ x]    ABDOMEN [ x] [x ]    UROGENITAL [ n/a] [ n/a]    EXTREMITIES [ x] [x ]    OTHER        [ x ] I have discussed the risks and benefits and alternatives with the patient/family. They understand and agree to proceed with plan of care. [ x ] I have reviewed the History and Physical done within the last 30 days. Any changes noted above.     Jorge Jara DO  10/2/2023  5:28 PM

## 2023-10-02 NOTE — OPERATIVE REPORT
Colonoscopy Operative Report  Patient Name: Yocasta Kuo  YOB: 1970  MRN: [id number]  Procedure: Colonoscopy with biopsy  Pre-operative Diagnosis & Indication: Abdominal discomfort, IBS, history of lymphocytic colitis  Post-operative Diagnosis:   Normal-appearing terminal ileum status post biopsy to rule out ileitis  Normal-appearing colonic mucosa status post random colon biopsies to rule out microscopic colitis  Pandiverticulosis  Moderate size internal and external hemorrhoids  Attending Endoscopist: Virginia Palmer D.O. Informed Consent: The planned procedure(s), the explanation of the procedure, its expected benefits, the potential complications and risks and possible alternatives and their benefits and risks were discussed with the patient or the patient's surrogate. The discussion of risks, not limited to but including bleeding, infection, perforation, adverse effects from anesthesia, need for emergency surgery, medication effects, cardiac arrhythmias, missed polyps, and aspiration and death, were discussed with patient. Pt and/or surrogate understood the proposed procedure(s), its risks, benefits and alternatives and wish to proceed with procedure(s). All questions answered in full. After all questions were answered to their satisfaction, a signed, informed, and witnessed consent was obtained. A TIME OUT WAS COMPLETED prior to the procedure to confirm the patient, procedure and complete endoscopy safety procedure. Sedation: Monitored Anesthesia Care; ASA class per anesthesiology team   Monitoring: Pulsoximetry, pulse, respirations, and blood pressure, vitals were monitored throughout the entire procedure under monitored anesthesia care. Preparation Quality:  [place] Bowel Prep Score: 9 [Right 3/ Transverse 3/ Left 3]   Procedure: After achieving adequate sedation, and placing the patient in the left lateral decubitus position, a digital rectal examination was performed.   The lubricated tip of the adult colonoscope was then introduced into the rectum and advanced to the cecum. The appendiceal orifice and ileocecal valve were clearly and distinctly visualized, thus verifying the cecum. The terminal ileum was intubated. The endoscope was then carefully withdrawn from the patient with careful visualization of the colonic mucosa to the best of my ability, with bowel preparation quality as noted above. Air was suctioned to the best of my ability, during withdrawal of the endoscope. When the endoscope reached the rectum, it was placed in a retroflexed position, and the rectal bulb was thus visualized. The endoscope was righted, and air was suctioned from the colon to the best of my ability, as it was during withdrawn from the colon. The endoscope was then removed from the patient. The patient tolerated the procedure without apparent procedural complications. The patient left the procedure room in stable condition for recovery. Toleration: Patient tolerated procedure well  Complications: No immediate complications  Technical Difficulty: The procedure was not technically difficult   Estimated Blood Loss: Minimal, less than 5mL of estimated blood loss. Findings and Therapeutics:  Terminal Ileum: The entire examined ileum was normal.  Random biopsies were taken throughout the terminal ileum to assess for ileitis. Colon:  The entire visualized colon was normal. There were no signs of polyps or masses. No signs of inflammation, ulceration or erosions. There were diverticula noted throughout the entire visualized colon. Random biopsies were taken throughout the colon to rule out microscopic colitis. Rectum: There were moderate sized, internal and external hemorrhoids seen on retroflexion. Recommendations:   Post Colonoscopy/polypectomy precautions, watch for bleeding, infection, perforation, adverse drug reactions.    High fiber diet  Follow-up pathology results  Repeat colonoscopy in 10 years for screening    Jovita Nieto DO  10/2/2023  11:04 AM

## 2023-10-02 NOTE — OPERATIVE REPORT
EGD Operative Report  Patient Name: Alma Carvajal  YOB: 1970  MRN: [id number]  Procedure: Esophagogastroduodenoscopy (EGD) with biopsy  Date of service: October 2, 2023  Pre-operative Diagnosis & Indication: Dysphagia, abdominal pain  Post-operative Diagnosis:   Esophagus with GE junction at 38 cm status post mid and distal esophageal biopsies to rule out eosinophil esophagitis and reflux esophagitis  Mild gastritis status post biopsy to rule out H. pylori  Normal-appearing duodenum status post biopsy to rule out celiac disease  Attending Endoscopist: Nyasia Alonso D.O. Informed Consent: The planned procedure(s), the explanation of the procedure, its expected benefits, the potential complications and risks and possible alternatives and their benefits and risks were discussed with the patient or the patient's surrogate. The discussion of risks, not limited to but including bleeding, infection, perforation, adverse effects from anesthesia, need for emergency surgery/prolonged hospitalization,  cardiac arrhythmias,  and aspiration were discussed with patient. Pt and/or surrogate understood the proposed procedure(s), its risks, benefits and alternatives and wish to proceed with procedure(s). All questions answered in full. After all questions were answered to their satisfaction, a signed, informed, and witnessed consent was obtained. A TIME OUT WAS COMPLETED prior to the procedure to confirm the patient, procedure(s) and complete endoscopy safety procedure. Sedation: Monitored Anesthesia Care; ASA class per anesthesiology team   Monitoring: Pulsoximetry, pulse, respirations, and blood pressure , vitals were monitored throughout the entire procedure under monitored anesthesia care. Procedure: The patient was then brought to the endoscopy suite where his/her pulse, pulse oximetry and blood pressure were monitored.  The patient was placed in the left lateral decubitus position and deep sedation was administered. Once adequate sedation was achieved, a bite block was placed and a lubricated tip of an Olympus video upper endoscope was inserted through the oropharynx and gently manipulated through the esophagus into the stomach and the second portion of the duodenum. Upon withdrawal of the endoscope, careful visualization of the mucosa was performed. The endoscope was then withdrawn into the gastric antrum and placed in a retroflexed position. The endoscope was then righted, and air was suctioned from the stomach. The endoscope was then withdrawn from the patient, with careful visual inspection of the mucosa. The patient left the procedure room in stable condition for recovery. Findings and endotherapy as listed below  Toleration: Patient tolerated procedure well  Complications: No immediate complications   Technical Difficulty:  The procedure was not technically difficult   Estimated Blood Loss: Minimal, less than 5mL of estimated blood loss. Findings and Therapeutics:  Esophagus: The mucosa was normal.   There were no strictures or stenosis. GEJ junction traversed with endoscope without resistance. The Z-line was irregular, appreciated at 38 cm from the incisors. Biopsies were obtained with cold forceps in the mid and distal esophagus rule out reflux esophagitis and eosinophilic esophagitis. Stomach: The gastric body, antrum, fundus, cardia, and angularis were mildly erythematous endoscopically consistent with mild gastritis. No ulcers, erosions or masses visualized. Endoscope was placed in a retroflexion view in the stomach. There was  no evidence of a hiatal hernia. Biopsies with cold forceps were obtained to rule out H. pylori. Duodenum:   The entire examined duodenum was normal.  Biopsies with cold forceps were obtained.   To rule out celiac disease  Recommendations:  Post EGD precautions, watch for bleeding, infection, perforation, adverse drug reactions   Follow-up biopsies  Avoid non-aspirin NSAID  Proceed with colonoscopy    April Hernandez DO  10/2/2023  10:56 AM

## 2023-10-12 ENCOUNTER — OFFICE VISIT (OUTPATIENT)
Dept: HEMATOLOGY/ONCOLOGY | Facility: HOSPITAL | Age: 53
End: 2023-10-12
Attending: INTERNAL MEDICINE
Payer: COMMERCIAL

## 2023-10-12 VITALS
BODY MASS INDEX: 52 KG/M2 | OXYGEN SATURATION: 98 % | DIASTOLIC BLOOD PRESSURE: 78 MMHG | WEIGHT: 293 LBS | TEMPERATURE: 99 F | HEART RATE: 96 BPM | SYSTOLIC BLOOD PRESSURE: 146 MMHG | RESPIRATION RATE: 18 BRPM

## 2023-10-12 DIAGNOSIS — E80.20: ICD-10-CM

## 2023-10-12 DIAGNOSIS — I82.90 VTE (VENOUS THROMBOEMBOLISM): ICD-10-CM

## 2023-10-12 DIAGNOSIS — U09.9 MULTIPLE PERSISTENT SYMPTOMS AFTER COVID-19: ICD-10-CM

## 2023-10-12 DIAGNOSIS — R89.9 ABNORMAL LABORATORY TEST: Primary | ICD-10-CM

## 2023-10-12 PROCEDURE — 99214 OFFICE O/P EST MOD 30 MIN: CPT | Performed by: INTERNAL MEDICINE

## 2023-10-19 ENCOUNTER — NURSE ONLY (OUTPATIENT)
Facility: CLINIC | Age: 53
End: 2023-10-19
Payer: COMMERCIAL

## 2023-10-19 VITALS — BODY MASS INDEX: 50.02 KG/M2 | HEIGHT: 64 IN | WEIGHT: 293 LBS

## 2023-10-19 DIAGNOSIS — R09.02 HYPOXEMIA: Primary | ICD-10-CM

## 2023-10-19 PROCEDURE — 3008F BODY MASS INDEX DOCD: CPT | Performed by: INTERNAL MEDICINE

## 2023-10-19 PROCEDURE — 94618 PULMONARY STRESS TESTING: CPT | Performed by: INTERNAL MEDICINE

## 2023-10-19 NOTE — PROGRESS NOTES
OXYGEN CLINIC ASSESSMENT    Date: 10/19/2023 Physician: DR. Ramsey Mo   Diagnosis: DYSPNEA Technician: Merlin Knife., RT     Patient: Bárbara Lange MRN: [id number] : 1970     Height: 5' 4\" (1.626 m) Weight: 295 lb Age: 45year old         BP HR SpO2 RR JENNIFER DIST  (FT) TIME Reason Stopped   RA         REST    136/80 102   97     18     0.5     N/A         N/A         N/A           RA       PEAK  EXERCISE 140/84 118 95   24   3   1200   6 min   Study Ended                FINDINGS  0 LPM required to maintain a saturation of 97 % at rest   0 LPM required to maintain a saturation of 95 % with exertion     LPM Pulse dose to maintain a saturation of   % with exertion   Patient does not need supplemental oxygen at rest or for exertion.

## 2023-10-24 ENCOUNTER — OFFICE VISIT (OUTPATIENT)
Facility: CLINIC | Age: 53
End: 2023-10-24

## 2023-10-24 VITALS
DIASTOLIC BLOOD PRESSURE: 80 MMHG | SYSTOLIC BLOOD PRESSURE: 130 MMHG | OXYGEN SATURATION: 98 % | HEART RATE: 110 BPM | RESPIRATION RATE: 18 BRPM | BODY MASS INDEX: 51 KG/M2 | HEIGHT: 64 IN

## 2023-10-24 DIAGNOSIS — R06.00 DYSPNEA AND RESPIRATORY ABNORMALITIES: Primary | ICD-10-CM

## 2023-10-24 DIAGNOSIS — R06.89 DYSPNEA AND RESPIRATORY ABNORMALITIES: Primary | ICD-10-CM

## 2023-10-24 PROCEDURE — 99214 OFFICE O/P EST MOD 30 MIN: CPT | Performed by: INTERNAL MEDICINE

## 2023-10-24 PROCEDURE — 3079F DIAST BP 80-89 MM HG: CPT | Performed by: INTERNAL MEDICINE

## 2023-10-24 PROCEDURE — 3075F SYST BP GE 130 - 139MM HG: CPT | Performed by: INTERNAL MEDICINE

## 2023-10-24 NOTE — PATIENT INSTRUCTIONS
PLan- - to get PFTs -- call for results               - to get sleep study next week                - consider new covid vaccine -               - see me in 3 months -                                  -    Eddi Zelaya MD  Pulmonary Medicine  10/24/2023

## 2023-10-25 ENCOUNTER — RT VISIT (OUTPATIENT)
Dept: RESPIRATORY THERAPY | Facility: HOSPITAL | Age: 53
End: 2023-10-25
Attending: INTERNAL MEDICINE

## 2023-10-25 DIAGNOSIS — R91.8 PULMONARY INFILTRATE: ICD-10-CM

## 2023-10-25 PROCEDURE — 94726 PLETHYSMOGRAPHY LUNG VOLUMES: CPT | Performed by: INTERNAL MEDICINE

## 2023-10-25 PROCEDURE — 94010 BREATHING CAPACITY TEST: CPT | Performed by: INTERNAL MEDICINE

## 2023-10-25 PROCEDURE — 94729 DIFFUSING CAPACITY: CPT | Performed by: INTERNAL MEDICINE

## 2023-10-26 NOTE — PROCEDURES
Findings:  FEV1 is 2.67L, 101% predicted. FVC is 3.29L, 99% predicted. FEV1/ FVC ratio is 0.81. The flow-volume loop demonstrates a normal pattern. The TLC is 5.35L, 108% predicted. The residual volume 2.06L, 114% predicted. The diffusion capacity is 109% predicted and 115% predicted when corrected for alveolar volume. Impression:  There is no airway obstruction on spirometry and visualized on flow-volume loop. Lung volumes are normal.  Diffusion capacity is normal.  There are no previous pulmonary function tests available for comparison.

## 2023-10-28 ENCOUNTER — LAB ENCOUNTER (OUTPATIENT)
Dept: LAB | Age: 53
End: 2023-10-28
Attending: NURSE PRACTITIONER
Payer: COMMERCIAL

## 2023-10-28 ENCOUNTER — HOSPITAL ENCOUNTER (OUTPATIENT)
Dept: ULTRASOUND IMAGING | Age: 53
Discharge: HOME | End: 2023-10-28
Attending: NURSE PRACTITIONER
Payer: COMMERCIAL

## 2023-10-28 DIAGNOSIS — R10.32 BILATERAL LOWER ABDOMINAL PAIN: ICD-10-CM

## 2023-10-28 DIAGNOSIS — R10.31 BILATERAL LOWER ABDOMINAL PAIN: ICD-10-CM

## 2023-10-28 DIAGNOSIS — R74.8 ELEVATED ALKALINE PHOSPHATASE LEVEL: ICD-10-CM

## 2023-10-28 DIAGNOSIS — R74.01 ELEVATED ALT MEASUREMENT: ICD-10-CM

## 2023-10-28 LAB
A1AT SERPL-MCNC: 162 MG/DL (ref 90–200)
GGT SERPL-CCNC: 23 U/L

## 2023-10-28 PROCEDURE — 76700 US EXAM ABDOM COMPLETE: CPT | Performed by: NURSE PRACTITIONER

## 2023-10-28 PROCEDURE — 83516 IMMUNOASSAY NONANTIBODY: CPT

## 2023-10-28 PROCEDURE — 82977 ASSAY OF GGT: CPT

## 2023-10-28 PROCEDURE — 84080 ASSAY ALKALINE PHOSPHATASES: CPT

## 2023-10-28 PROCEDURE — 82103 ALPHA-1-ANTITRYPSIN TOTAL: CPT

## 2023-10-28 PROCEDURE — 84075 ASSAY ALKALINE PHOSPHATASE: CPT

## 2023-10-28 PROCEDURE — 36415 COLL VENOUS BLD VENIPUNCTURE: CPT

## 2023-10-31 LAB
ACTIN SMOOTH MUSCLE AB: 11 UNITS
ALK PHOSPHATASE: 121 IU/L
BONE FRAC: 24 %
INTESTINAL FRAC: 6 %
LIVER FRAC: 70 %
M2 MITOCHONDRIAL AB: <20 UNITS

## 2023-11-13 ENCOUNTER — APPOINTMENT (OUTPATIENT)
Dept: HEMATOLOGY/ONCOLOGY | Facility: HOSPITAL | Age: 53
End: 2023-11-13
Attending: INTERNAL MEDICINE
Payer: COMMERCIAL

## 2024-02-11 ENCOUNTER — LAB ENCOUNTER (OUTPATIENT)
Dept: LAB | Facility: HOSPITAL | Age: 54
End: 2024-02-11
Attending: INTERNAL MEDICINE
Payer: COMMERCIAL

## 2024-02-11 DIAGNOSIS — E03.9 ACQUIRED HYPOTHYROIDISM: Primary | ICD-10-CM

## 2024-02-11 DIAGNOSIS — R06.00 DYSPNEA, PAROXYSMAL NOCTURNAL: ICD-10-CM

## 2024-02-11 DIAGNOSIS — M79.604 RIGHT LEG PAIN: ICD-10-CM

## 2024-02-11 DIAGNOSIS — E66.01 MORBID OBESITY (HCC): ICD-10-CM

## 2024-02-11 DIAGNOSIS — E55.9 VITAMIN D DEFICIENCY: ICD-10-CM

## 2024-02-11 DIAGNOSIS — I34.0 MITRAL INCOMPETENCE: ICD-10-CM

## 2024-02-11 DIAGNOSIS — Z82.49 FAMILY HISTORY OF ISCHEMIC HEART DISEASE: ICD-10-CM

## 2024-02-11 DIAGNOSIS — R73.9 BLOOD GLUCOSE ELEVATED: ICD-10-CM

## 2024-02-11 LAB
ALBUMIN SERPL-MCNC: 3.5 G/DL (ref 3.4–5)
ALBUMIN/GLOB SERPL: 0.8 {RATIO} (ref 1–2)
ALP LIVER SERPL-CCNC: 120 U/L
ALT SERPL-CCNC: 24 U/L
ANION GAP SERPL CALC-SCNC: 3 MMOL/L (ref 0–18)
AST SERPL-CCNC: 12 U/L (ref 15–37)
BASOPHILS # BLD AUTO: 0.04 X10(3) UL (ref 0–0.2)
BASOPHILS NFR BLD AUTO: 0.6 %
BILIRUB SERPL-MCNC: 0.3 MG/DL (ref 0.1–2)
BUN BLD-MCNC: 8 MG/DL (ref 9–23)
CALCIUM BLD-MCNC: 8.2 MG/DL (ref 8.5–10.1)
CHLORIDE SERPL-SCNC: 110 MMOL/L (ref 98–112)
CHOLEST SERPL-MCNC: 176 MG/DL (ref ?–200)
CO2 SERPL-SCNC: 29 MMOL/L (ref 21–32)
CREAT BLD-MCNC: 0.82 MG/DL
CRP SERPL HS-MCNC: 9.22 MG/L (ref ?–3)
EGFRCR SERPLBLD CKD-EPI 2021: 84 ML/MIN/1.73M2 (ref 60–?)
EOSINOPHIL # BLD AUTO: 0.22 X10(3) UL (ref 0–0.7)
EOSINOPHIL NFR BLD AUTO: 3.2 %
ERYTHROCYTE [DISTWIDTH] IN BLOOD BY AUTOMATED COUNT: 13.6 %
ERYTHROCYTE [SEDIMENTATION RATE] IN BLOOD: 41 MM/HR
FASTING PATIENT LIPID ANSWER: YES
FASTING STATUS PATIENT QL REPORTED: YES
GLOBULIN PLAS-MCNC: 4.4 G/DL (ref 2.8–4.4)
GLUCOSE BLD-MCNC: 101 MG/DL (ref 70–99)
HCT VFR BLD AUTO: 40.7 %
HDLC SERPL-MCNC: 63 MG/DL (ref 40–59)
HGB BLD-MCNC: 13.5 G/DL
IMM GRANULOCYTES # BLD AUTO: 0.02 X10(3) UL (ref 0–1)
IMM GRANULOCYTES NFR BLD: 0.3 %
LDLC SERPL CALC-MCNC: 94 MG/DL (ref ?–100)
LYMPHOCYTES # BLD AUTO: 1.37 X10(3) UL (ref 1–4)
LYMPHOCYTES NFR BLD AUTO: 19.7 %
MCH RBC QN AUTO: 26.7 PG (ref 26–34)
MCHC RBC AUTO-ENTMCNC: 33.2 G/DL (ref 31–37)
MCV RBC AUTO: 80.4 FL
MONOCYTES # BLD AUTO: 0.54 X10(3) UL (ref 0.1–1)
MONOCYTES NFR BLD AUTO: 7.8 %
NEUTROPHILS # BLD AUTO: 4.76 X10 (3) UL (ref 1.5–7.7)
NEUTROPHILS # BLD AUTO: 4.76 X10(3) UL (ref 1.5–7.7)
NEUTROPHILS NFR BLD AUTO: 68.4 %
NONHDLC SERPL-MCNC: 113 MG/DL (ref ?–130)
NT-PROBNP SERPL-MCNC: 107 PG/ML (ref ?–125)
OSMOLALITY SERPL CALC.SUM OF ELEC: 292 MOSM/KG (ref 275–295)
PLATELET # BLD AUTO: 311 10(3)UL (ref 150–450)
POTASSIUM SERPL-SCNC: 3.9 MMOL/L (ref 3.5–5.1)
PROT SERPL-MCNC: 7.9 G/DL (ref 6.4–8.2)
RBC # BLD AUTO: 5.06 X10(6)UL
SODIUM SERPL-SCNC: 142 MMOL/L (ref 136–145)
TRIGL SERPL-MCNC: 107 MG/DL (ref 30–149)
TSI SER-ACNC: 0.95 MIU/ML (ref 0.36–3.74)
VIT D+METAB SERPL-MCNC: 25.4 NG/ML (ref 30–100)
VLDLC SERPL CALC-MCNC: 17 MG/DL (ref 0–30)
WBC # BLD AUTO: 7 X10(3) UL (ref 4–11)

## 2024-02-11 PROCEDURE — 36415 COLL VENOUS BLD VENIPUNCTURE: CPT

## 2024-02-11 PROCEDURE — 83880 ASSAY OF NATRIURETIC PEPTIDE: CPT

## 2024-02-11 PROCEDURE — 85025 COMPLETE CBC W/AUTO DIFF WBC: CPT

## 2024-02-11 PROCEDURE — 82306 VITAMIN D 25 HYDROXY: CPT

## 2024-02-11 PROCEDURE — 85652 RBC SED RATE AUTOMATED: CPT

## 2024-02-11 PROCEDURE — 80053 COMPREHEN METABOLIC PANEL: CPT

## 2024-02-11 PROCEDURE — 80061 LIPID PANEL: CPT

## 2024-02-11 PROCEDURE — 84443 ASSAY THYROID STIM HORMONE: CPT

## 2024-02-11 PROCEDURE — 86141 C-REACTIVE PROTEIN HS: CPT

## 2024-04-24 ENCOUNTER — HOSPITAL ENCOUNTER (OUTPATIENT)
Age: 54
Discharge: HOME | End: 2024-04-24
Payer: COMMERCIAL

## 2024-04-24 ENCOUNTER — APPOINTMENT (OUTPATIENT)
Dept: GENERAL RADIOLOGY | Age: 54
End: 2024-04-24
Attending: PHYSICIAN ASSISTANT
Payer: COMMERCIAL

## 2024-04-24 VITALS
DIASTOLIC BLOOD PRESSURE: 85 MMHG | SYSTOLIC BLOOD PRESSURE: 167 MMHG | HEART RATE: 98 BPM | OXYGEN SATURATION: 98 % | TEMPERATURE: 98 F | HEIGHT: 64 IN | WEIGHT: 293 LBS | BODY MASS INDEX: 50.02 KG/M2 | RESPIRATION RATE: 22 BRPM

## 2024-04-24 DIAGNOSIS — I10 ELEVATED BLOOD PRESSURE READING WITH DIAGNOSIS OF HYPERTENSION: ICD-10-CM

## 2024-04-24 DIAGNOSIS — R05.8 PRODUCTIVE COUGH: Primary | ICD-10-CM

## 2024-04-24 PROCEDURE — 99203 OFFICE O/P NEW LOW 30 MIN: CPT | Performed by: PHYSICIAN ASSISTANT

## 2024-04-24 PROCEDURE — 71046 X-RAY EXAM CHEST 2 VIEWS: CPT | Performed by: PHYSICIAN ASSISTANT

## 2024-04-24 RX ORDER — SEMAGLUTIDE 0.25 MG/.5ML
0.25 INJECTION, SOLUTION SUBCUTANEOUS WEEKLY
COMMUNITY
Start: 2024-01-17

## 2024-04-24 RX ORDER — ESCITALOPRAM OXALATE 20 MG/1
TABLET ORAL
COMMUNITY
Start: 2024-04-17

## 2024-04-24 RX ORDER — ALBUTEROL SULFATE 90 UG/1
2 AEROSOL, METERED RESPIRATORY (INHALATION) ONCE
Status: COMPLETED | OUTPATIENT
Start: 2024-04-24 | End: 2024-04-24

## 2024-04-24 RX ORDER — AZITHROMYCIN 250 MG/1
TABLET, FILM COATED ORAL
Qty: 6 TABLET | Refills: 0 | Status: SHIPPED | OUTPATIENT
Start: 2024-04-24 | End: 2024-04-29

## 2024-04-24 RX ORDER — BUPROPION HYDROCHLORIDE 150 MG/1
TABLET ORAL
COMMUNITY
Start: 2024-02-19

## 2024-04-24 RX ORDER — TOPIRAMATE 100 MG/1
100 TABLET, FILM COATED ORAL DAILY
COMMUNITY
Start: 2024-04-22

## 2024-04-24 RX ORDER — PREDNISONE 20 MG/1
40 TABLET ORAL DAILY
Qty: 8 TABLET | Refills: 0 | Status: SHIPPED | OUTPATIENT
Start: 2024-04-24 | End: 2024-04-28

## 2024-04-24 RX ORDER — TOPIRAMATE 50 MG/1
TABLET, FILM COATED ORAL
COMMUNITY
Start: 2023-12-19

## 2024-04-24 RX ORDER — AMOXICILLIN 500 MG/1
1000 TABLET, FILM COATED ORAL 3 TIMES DAILY
Qty: 30 TABLET | Refills: 0 | Status: SHIPPED | OUTPATIENT
Start: 2024-04-24 | End: 2024-04-29

## 2024-04-24 RX ORDER — FEZOLINETANT 45 MG/1
TABLET, FILM COATED ORAL
COMMUNITY

## 2024-04-24 RX ORDER — LEVOCETIRIZINE DIHYDROCHLORIDE 5 MG/1
5 TABLET, FILM COATED ORAL EVERY EVENING
COMMUNITY
Start: 2023-12-20

## 2024-04-24 NOTE — ED PROVIDER NOTES
Patient Seen in: [facility]      History     Chief Complaint   Patient presents with    Cough/URI     Stated Complaint: headaches sinus difficulty breathing fatigue    Subjective:   HPI    52 yo female  here with complaint of sinus pain and pressure purulent drainage and tender with a productive cough and plugged ears.  Patient reports it has been going on for couple weeks.  Patient denies chest pain, abdominal pain, nausea, vomiting or diarrhea.  Patient is tolerating p.o. speaking full sentences.  Patient has a history of hypertension.  Patient denies headache or blurry vision.  Patient is taking her medication just does not feel well.  Afebrile    Objective:   Past Medical History:    Abdominal distention    Abdominal pain    Anemia    Have had anemia several times    Anxiety    Arthritis    Autoimmune disease (HCC)    Back pain    Back problem    low back pain    Bad breath    Belching    Bloating    Blurred vision    Chest pain    Chest pain on exertion    Constipation    DEPRESSION    Depression    Diarrhea, unspecified    Dizziness    DVT (deep venous thrombosis) ([facility])    bilateral    Easy bruising    Endocrine disorder    Esophageal reflux    Essential hypertension    Essential hypertension, benign    Exposure to TB    Fatigue    Feeling lonely    Fibromyalgia    joint pain aches pain stiffness    Flatulence/gas pain/belching    Food intolerance    Off and on    Frequent urination    Gastritis    GERD    Headache disorder    migraines, injections    HEADACHES    migraine    Hearing loss    Heartburn    Heavy menses    No periods since ablation in 2014    Hemorrhoids    High cholesterol    History of blood clots    legs bilat    History of depression    History of mental disorder    History of miscarriage    Hoarseness, chronic    Hx of motion sickness    HYPOTHYROIDISM    IBS (irritable bowel syndrome)    Indigestion    Insomnia    Irregular bowel habits    Itch of skin    Leaking of  urine    Leg swelling    Loss of appetite    Menses painful    Had ablation 2014    Migraines    Nausea    Night sweats    OA (osteoarthritis)    right knee    Obesity    TAYLOR (obstructive sleep apnea)    cpap    Osteoarthrosis, unspecified whether generalized or localized, unspecified site    Pain in joints    Pain with bowel movements    Phlebitis and thrombophlebitis of other deep vessels of lower extremities    Popliteal synovial cyst    Post partum depression    Reflux esophagitis    RLS (restless legs syndrome)    Skin blushing/flushing    Sleep apnea    Sleep disturbance    Stool incontinence    Sonetimes urge comes on very quickly    Stress    Thyroid disease    Had diagnosis of hypothyroid, and then Hyperthyroid and now    Uncomfortable fullness after meals    Unspecified sleep apnea    Visual impairment    glasses    Wears glasses    Weight gain    Weight loss            The patient's medication list, past medical history and social history elements  as listed in today's nurse's notes are reviewed and agree.   The patient's family history is reviewed and is noncontributory to the presenting problem, except as indicated as above.     Past Surgical History:   Procedure Laterality Date    Cholecystectomy  1/2001    gallbladder removed    Colonoscopy  8/14/2013    Procedure: COLONOSCOPY;  Surgeon: Frankel, Jennifer, MD;  Location:  ENDOSCOPY    Colonoscopy  10/3/2014    Procedure: COLONOSCOPY;  Surgeon: Devaughn Hall MD;  Location:  ENDOSCOPY    Colonoscopy  10/2014    repeat in 10 years    Colonoscopy N/A 10/8/2014    Procedure: COLONOSCOPY;  Surgeon: Azar Galeano MD;  Location:  ENDOSCOPY    Colonoscopy N/A 10/2/2023    Procedure: COLONOSCOPY;  Surgeon: Arsen Campbell DO;  Location:  ENDOSCOPY    D & c      x3    Dilation/curettage,diagnostic      Endometrial ablation      Lumpectomy left  1/2006, 1/2009    Other surgical history      benign lump removal    Other surgical history  6/13     colonoscopy    Other surgical history      upper endoscopy    Other surgical history N/A 6/4/2015    Procedure: ESOPHAGOGASTRODUODENOSCOPY (EGD);  Surgeon: Deangelo Gómez MD;  Location:  ENDOSCOPY                Social History     Socioeconomic History    Marital status:    Occupational History    Occupation: [employer]   Tobacco Use    Smoking status: Never     Passive exposure: Past (parents smoked in home when younger)    Smokeless tobacco: Never   Vaping Use    Vaping status: Never Used   Substance and Sexual Activity    Alcohol use: Not Currently     Comment: one drink rarely    Drug use: Never   Other Topics Concern    Caffeine Concern Yes     Comment: coffee, soda-few times per week    Exercise No    Seat Belt Yes     Social Determinants of Health     Financial Resource Strain: Medium Risk (3/21/2022)    [facility]    Overall Financial Resource Strain (CARDIA)     Difficulty of Paying Living Expenses: Somewhat hard   Food Insecurity: Food Insecurity Present (3/21/2022)    [facility]    Hunger Vital Sign     Worried About Running Out of Food in the Last Year: Sometimes true     Ran Out of Food in the Last Year: Never true   Transportation Needs: Unmet Transportation Needs (3/21/2022)    [facility]    PRAPARE - Transportation     Lack of Transportation (Medical): Yes     Lack of Transportation (Non-Medical): No   Physical Activity: Insufficiently Active (3/21/2022)    Exercise Vital Sign     Days of Exercise per Week: 2 days     Minutes of Exercise per Session: 20 min   Stress: Stress Concern Present (3/21/2022)    [facility]    [employer] [place] [employer] - Occupational Stress Questionnaire     Feeling of Stress : Very much    Social Connections    [facility]              Review of Systems    Positive for stated complaint: headaches sinus difficulty breathing fatigue  Other systems are as noted in HPI.  Constitutional and vital signs reviewed.      All other systems reviewed and negative except as noted above.    Physical Exam     ED Triage Vitals [04/24/24 0812]   BP (!) 167/85   Pulse 98   Resp 22   Temp 98.2 °F (36.8 °C)   Temp src Oral   SpO2 98 %   O2 Device None (Room air)       Current:BP (!) 167/85   Pulse 98   Temp 98.2 °F (36.8 °C) (Oral)   Resp 22   Ht 162.6 cm (5' 4\")   Wt 135.2 kg   SpO2 98%   BMI 51.15 kg/m²         Physical Exam  Vitals and nursing note reviewed.   Constitutional:       Appearance: Normal appearance. She is well-developed.   HENT:      Head: Normocephalic.      Jaw: There is normal jaw occlusion.      Right Ear: External ear normal. Tympanic membrane is bulging.      Left Ear: External ear normal. Tympanic membrane is bulging.      Nose: Mucosal edema, congestion and rhinorrhea present. Rhinorrhea is purulent.      Right Sinus: Maxillary sinus tenderness and frontal sinus tenderness present.      Left Sinus: Maxillary sinus tenderness and frontal sinus tenderness present.      Mouth/Throat:      Lips: Pink.      Mouth: Mucous membranes are moist.      Pharynx: Oropharynx is clear.      Comments: Uvula midline: No trismus or drooling, no peritonsillar abscess noted moderate cobblestoning of the posterior pharynx.  Eyes:      Conjunctiva/sclera: Conjunctivae normal.      Pupils: Pupils are equal, round, and reactive to light.   Cardiovascular:      Rate and Rhythm: Normal rate and regular rhythm.      Heart sounds: Normal heart sounds.   Pulmonary:      Effort: Pulmonary effort is normal.      Breath sounds: Examination of the right-lower field reveals decreased breath sounds. Examination of the left-lower field reveals decreased breath sounds. Decreased breath sounds, wheezing and rhonchi present.   Musculoskeletal:       Cervical back: Normal range of motion and neck supple.   Skin:     General: Skin is warm.   Neurological:      Mental Status: She is alert and oriented to person, place, and time.   Psychiatric:         Behavior: Behavior normal.         Thought Content: Thought content normal.         Judgment: Judgment normal.             ED Course   I personally reviewed the xray images and and saw these findings: pneumonia LL  XR CHEST PA + LAT CHEST (CPT=71046)    Result Date: 4/24/2024  PROCEDURE:  XR CHEST PA + LAT CHEST (CPT=71046)  INDICATIONS:  headaches sinus difficulty breathing fatigue  COMPARISON:  [facility] , XR, XR CHEST AP PORTABLE  (CPT=71045), 3/22/2023, 11:39 PM.  TECHNIQUE:  PA and lateral chest radiographs were obtained.  PATIENT STATED HISTORY: (As transcribed by Technologist)  Headaches sinus difficulty breathing fatigue Pt began 4-5 weeks ago with headaches,  She now has severe nasal congestion    .            CONCLUSION:   Normal cardiac and mediastinal contours.  Hazy opacification of the lower lungs likely represent superimposition of breast tissue.  The lungs and pleural spaces are clear.  Minimal degenerative changes of the spine.   LOCATION:  [id number]   Dictated by (CST): Tulio Avelar MD on 4/24/2024 at 8:50 AM     Finalized by (CST): Taye Olivera MD on 4/24/2024 at 8:58 AM           NOTE: on auscultation decreased sounds in LL will treat for pneumonia           MDM     Clinical Impression: pneumonia/elevated blood pressure reading with diagnosis HTN  Course of Treatment:   The decadron will work in your system in the next several days.  You may start the additional prednisone on day 2-3 if symptoms persist.  Use your inhaler every 4-6 hours as needed.  Recommend taking an over the counter antihistamine daily: IE zyrtec/claritin.  Sleep more upright. Use chloraseptic spray to help stop the cough trigger reflex.  Push fluids and gargle with warm saline rinses.   Take the full course of antibiotics as  prescribed in tandem with a probiotic daily.  Where his antibiotics kill the bad bacteria they also kill the good gut oli.  Some good OTC brands are Culturelle, Florastor and Align.  If symptoms persist or worsen i.e. increasing fevers or shortness of breath go directly to the emergency room.  Otherwise close supervision with your PCP for further evaluation and treatment.        The patient is encouraged to return if any concerning symptoms arise. Additional verbal discharge instructions are given and discussed. Discharge medications are discussed. The patient is in good condition throughout the visit today and remains so upon discharge. I discuss the plan of care with the patient, who expresses understanding. All questions and concerns are addressed to the patient's satisfaction prior to discharge today.  Previous conversations with PCP and charts were reviewed.                                           Disposition and Plan     Clinical Impression:  1. Productive cough    2. Elevated blood pressure reading with diagnosis of hypertension         Disposition:  Discharge  4/24/2024  9:10 am    Follow-up:  [facility], [address] [facility], [place] [address] [address] [facility] [address]  [facility] [address] [phone]              Medications Prescribed:  Current Discharge Medication List        START taking these medications    Details   azithromycin (ZITHROMAX Z-SABRA) 250 MG Oral Tab 500 mg once followed by 250 mg daily x 4 days  Qty: 6 tablet, Refills: 0      amoxicillin 500 MG Oral Tab Take 2 tablets (1,000 mg total) by mouth in the morning and 2 tablets (1,000 mg total) at noon and 2 tablets (1,000 mg total) in the evening. Do all this for 5 days.  Qty: 30 tablet, Refills: 0      predniSONE 20 MG Oral Tab Take 2 tablets (40 mg total) by mouth daily for 4 days. Start on day 2-3 if symptoms persist  Qty: 8 tablet, Refills: 0

## 2024-04-24 NOTE — ED INITIAL ASSESSMENT (HPI)
Pt began 4-5 weeks ago with headaches, that she was taking her migraine meds for.  She now has severe nasal congestion with bloody green drainage, and a dry throat with mucus.  Her ears are plugged and she is not able to take a deep breath

## 2024-04-24 NOTE — DISCHARGE INSTRUCTIONS
Please return to the ER/clinic if symptoms worsen. Follow-up with your PCP in 24-48 hours as needed.    The decadron will work in your system in the next several days.  You may start the additional prednisone on day 2-3 if symptoms persist.  Use your inhaler every 4-6 hours as needed.  Recommend taking an over the counter antihistamine daily: IE zyrtec/claritin.  Sleep more upright. Use chloraseptic spray to help stop the cough trigger reflex.  Push fluids and gargle with warm saline rinses.   Take the full course of antibiotics as prescribed in tandem with a probiotic daily.  Where his antibiotics kill the bad bacteria they also kill the good gut oli.  Some good OTC brands are Culturelle, Florastor and Align.  If symptoms persist or worsen i.e. increasing fevers or shortness of breath go directly to the emergency room.  Otherwise close supervision with your PCP for further evaluation and treatment.

## 2024-05-01 ENCOUNTER — APPOINTMENT (OUTPATIENT)
Dept: CT IMAGING | Facility: HOSPITAL | Age: 54
End: 2024-05-01
Attending: EMERGENCY MEDICINE
Payer: COMMERCIAL

## 2024-05-01 ENCOUNTER — APPOINTMENT (OUTPATIENT)
Dept: GENERAL RADIOLOGY | Age: 54
End: 2024-05-01
Attending: PHYSICIAN ASSISTANT
Payer: COMMERCIAL

## 2024-05-01 ENCOUNTER — HOSPITAL ENCOUNTER (EMERGENCY)
Facility: HOSPITAL | Age: 54
Discharge: HOME | End: 2024-05-01
Attending: EMERGENCY MEDICINE
Payer: COMMERCIAL

## 2024-05-01 ENCOUNTER — HOSPITAL ENCOUNTER (OUTPATIENT)
Age: 54
Discharge: ANOTHER HOSPITAL | End: 2024-05-01
Payer: COMMERCIAL

## 2024-05-01 VITALS
SYSTOLIC BLOOD PRESSURE: 127 MMHG | DIASTOLIC BLOOD PRESSURE: 79 MMHG | RESPIRATION RATE: 24 BRPM | OXYGEN SATURATION: 97 % | HEART RATE: 82 BPM | TEMPERATURE: 99 F

## 2024-05-01 VITALS
OXYGEN SATURATION: 100 % | SYSTOLIC BLOOD PRESSURE: 165 MMHG | BODY MASS INDEX: 50.02 KG/M2 | TEMPERATURE: 98 F | HEIGHT: 64 IN | WEIGHT: 293 LBS | RESPIRATION RATE: 22 BRPM | HEART RATE: 64 BPM | DIASTOLIC BLOOD PRESSURE: 83 MMHG

## 2024-05-01 DIAGNOSIS — M79.662 BILATERAL CALF PAIN: ICD-10-CM

## 2024-05-01 DIAGNOSIS — M79.661 BILATERAL CALF PAIN: ICD-10-CM

## 2024-05-01 DIAGNOSIS — Z87.01 HISTORY OF PNEUMONIA: Primary | ICD-10-CM

## 2024-05-01 DIAGNOSIS — R07.89 CHEST HEAVINESS: ICD-10-CM

## 2024-05-01 DIAGNOSIS — R42 DIZZINESS: ICD-10-CM

## 2024-05-01 DIAGNOSIS — R06.02 SHORTNESS OF BREATH: ICD-10-CM

## 2024-05-01 DIAGNOSIS — R06.02 SHORTNESS OF BREATH: Primary | ICD-10-CM

## 2024-05-01 LAB
ALBUMIN SERPL-MCNC: 3 G/DL (ref 3.4–5)
ALBUMIN/GLOB SERPL: 0.7 {RATIO} (ref 1–2)
ALP LIVER SERPL-CCNC: 123 U/L
ALT SERPL-CCNC: 20 U/L
ANION GAP SERPL CALC-SCNC: 4 MMOL/L (ref 0–18)
AST SERPL-CCNC: 13 U/L (ref 15–37)
BASOPHILS # BLD AUTO: 0.02 X10(3) UL (ref 0–0.2)
BASOPHILS NFR BLD AUTO: 0.3 %
BILIRUB SERPL-MCNC: 0.4 MG/DL (ref 0.1–2)
BUN BLD-MCNC: 5 MG/DL (ref 9–23)
CALCIUM BLD-MCNC: 9 MG/DL (ref 8.5–10.1)
CHLORIDE SERPL-SCNC: 107 MMOL/L (ref 98–112)
CO2 SERPL-SCNC: 27 MMOL/L (ref 21–32)
CREAT BLD-MCNC: 0.87 MG/DL
EGFRCR SERPLBLD CKD-EPI 2021: 79 ML/MIN/1.73M2 (ref 60–?)
EOSINOPHIL # BLD AUTO: 0.19 X10(3) UL (ref 0–0.7)
EOSINOPHIL NFR BLD AUTO: 2.4 %
ERYTHROCYTE [DISTWIDTH] IN BLOOD BY AUTOMATED COUNT: 14.2 %
GLOBULIN PLAS-MCNC: 4.4 G/DL (ref 2.8–4.4)
GLUCOSE BLD-MCNC: 93 MG/DL (ref 70–99)
HCT VFR BLD AUTO: 39.3 %
HGB BLD-MCNC: 13.1 G/DL
IMM GRANULOCYTES # BLD AUTO: 0.04 X10(3) UL (ref 0–1)
IMM GRANULOCYTES NFR BLD: 0.5 %
LYMPHOCYTES # BLD AUTO: 1.19 X10(3) UL (ref 1–4)
LYMPHOCYTES NFR BLD AUTO: 15.3 %
MCH RBC QN AUTO: 26.6 PG (ref 26–34)
MCHC RBC AUTO-ENTMCNC: 33.3 G/DL (ref 31–37)
MCV RBC AUTO: 79.7 FL
MONOCYTES # BLD AUTO: 0.68 X10(3) UL (ref 0.1–1)
MONOCYTES NFR BLD AUTO: 8.7 %
NEUTROPHILS # BLD AUTO: 5.67 X10 (3) UL (ref 1.5–7.7)
NEUTROPHILS # BLD AUTO: 5.67 X10(3) UL (ref 1.5–7.7)
NEUTROPHILS NFR BLD AUTO: 72.8 %
NT-PROBNP SERPL-MCNC: 74 PG/ML (ref ?–125)
OSMOLALITY SERPL CALC.SUM OF ELEC: 283 MOSM/KG (ref 275–295)
PLATELET # BLD AUTO: 298 10(3)UL (ref 150–450)
POTASSIUM SERPL-SCNC: 4 MMOL/L (ref 3.5–5.1)
PROT SERPL-MCNC: 7.4 G/DL (ref 6.4–8.2)
RBC # BLD AUTO: 4.93 X10(6)UL
SODIUM SERPL-SCNC: 138 MMOL/L (ref 136–145)
TROPONIN I SERPL HS-MCNC: <3 NG/L
TROPONIN I SERPL HS-MCNC: <3 NG/L
WBC # BLD AUTO: 7.8 X10(3) UL (ref 4–11)

## 2024-05-01 PROCEDURE — 36415 COLL VENOUS BLD VENIPUNCTURE: CPT

## 2024-05-01 PROCEDURE — 80053 COMPREHEN METABOLIC PANEL: CPT | Performed by: EMERGENCY MEDICINE

## 2024-05-01 PROCEDURE — 71275 CT ANGIOGRAPHY CHEST: CPT | Performed by: EMERGENCY MEDICINE

## 2024-05-01 PROCEDURE — 99284 EMERGENCY DEPT VISIT MOD MDM: CPT

## 2024-05-01 PROCEDURE — 93010 ELECTROCARDIOGRAM REPORT: CPT

## 2024-05-01 PROCEDURE — 85025 COMPLETE CBC W/AUTO DIFF WBC: CPT | Performed by: EMERGENCY MEDICINE

## 2024-05-01 PROCEDURE — 99285 EMERGENCY DEPT VISIT HI MDM: CPT

## 2024-05-01 PROCEDURE — 83880 ASSAY OF NATRIURETIC PEPTIDE: CPT | Performed by: EMERGENCY MEDICINE

## 2024-05-01 PROCEDURE — 84484 ASSAY OF TROPONIN QUANT: CPT | Performed by: EMERGENCY MEDICINE

## 2024-05-01 PROCEDURE — 71046 X-RAY EXAM CHEST 2 VIEWS: CPT | Performed by: PHYSICIAN ASSISTANT

## 2024-05-01 PROCEDURE — 93000 ELECTROCARDIOGRAM COMPLETE: CPT | Performed by: PHYSICIAN ASSISTANT

## 2024-05-01 PROCEDURE — 99215 OFFICE O/P EST HI 40 MIN: CPT | Performed by: PHYSICIAN ASSISTANT

## 2024-05-01 PROCEDURE — 93005 ELECTROCARDIOGRAM TRACING: CPT

## 2024-05-01 NOTE — DISCHARGE INSTRUCTIONS
Please go directly to the emergency department at [address] [place] [address] in [place].  Do not eat or drink until you have been evaluated by emergency department staff.

## 2024-05-01 NOTE — ED PROVIDER NOTES
Patient Seen in: [facility]      History     Chief Complaint   Patient presents with    Shortness Of Breath     Stated Complaint: dx with pneumonia last week, not feeling better    Subjective:   HPI    53-year-old with a history of anemia, DVT, GERD, hypertension, fibromyalgia, migraines, high cholesterol, IBS, sleep apnea, left bundle branch block, \"prediabetes\".  She presents for evaluation of shortness of breath and fatigue.  Patient developed cough, sneezing, runny nose 5-6 weeks ago with sinus drainage. Ear canals were swollen. Noted blood when she blew her nose as well. Went to  last week and was treated for PNA with amox, z galdino, prednisone and ventolin. Completed the course but isn't feeling any better. Feels short of breath and \"exhausted\". Feels hot and cold but no fever. Still having headaches. Developed chest pain 4 days ago, like something is sitting on her chest. Is constant but waxes and wanes; is worse with exertion. No hemoptysis. Legs haven't been swollen.  She did start topamax for migraines a month ago, also taking fioricet and OTC meds.  Records reviewed including endocrine consult from November 2023 patient was evaluated for fatigue.  Had been ongoing for 15 years.  Had previously been diagnosed with hypothyroidism and was on thyroid hormone but that has resolved.  A1c was 6.1% November 1, 2023.  She was referred to weight loss clinic./Unk note reviewed from 10/12/2023 patient had bilateral lower extremity DVTs after a previous fall with decreased activity on OCPs.  Was on warfarin for 6 months.  Continued to not feel well with fatigue, chest pain, headaches, dizziness, etc.  Chest x-ray from 4/24 read as hazy opacification of the lower lungs likely represents superimposition of breast tissues.  Chest x-ray from earlier today at urgent care was read as normal.  Was normal in February.  Globin A1c 5.5 in March.  Reviewed from March 2023 which was normal.  Coronary  Angiogram Findings:  LM: Large caliber artery giving rise to LAD & LCX. No significant stenosis.  LAD: Large caliber artery giving rise to diagonal and septal branches. No significant stenosis.  LCX: Medium to large caliber artery giving rise to OM branches. No significant stenosis..  RCA: Large caliber artery giving rise to acute marginal branches, and bifurcates into RPDA & RPL. No significant stenosis.. Right dominant circulation  Objective:   Past Medical History:    Abdominal distention    Abdominal pain    Anemia    Have had anemia several times    Anxiety    Arthritis    Autoimmune disease (HCC)    Back pain    Back problem    low back pain    Bad breath    Belching    Bloating    Blurred vision    Chest pain    Chest pain on exertion    Constipation    DEPRESSION    Depression    Diarrhea, unspecified    Dizziness    DVT (deep venous thrombosis) (HCC)    bilateral    Easy bruising    Endocrine disorder    Esophageal reflux    Essential hypertension    Essential hypertension, benign    Exposure to TB    Fatigue    Feeling lonely    Fibromyalgia    joint pain aches pain stiffness    Flatulence/gas pain/belching    Food intolerance    Off and on    Frequent urination    Gastritis    GERD    Headache disorder    migraines, injections    HEADACHES    migraine    Hearing loss    Heartburn    Heavy menses    No periods since ablation in 2014    Hemorrhoids    High cholesterol    History of blood clots    legs bilat    History of depression    History of mental disorder    History of miscarriage    Hoarseness, chronic    Hx of motion sickness    HYPOTHYROIDISM    IBS (irritable bowel syndrome)    Indigestion    Insomnia    Irregular bowel habits    Itch of skin    Leaking of urine    Leg swelling    Loss of appetite    Menses painful    Had ablation 2014    Migraines    Nausea    Night sweats    OA (osteoarthritis)    right knee    Obesity    TAYLOR (obstructive sleep apnea)    cpap    Osteoarthrosis, unspecified  whether generalized or localized, unspecified site    Pain in joints    Pain with bowel movements    Phlebitis and thrombophlebitis of other deep vessels of lower extremities    Popliteal synovial cyst    Post partum depression    Reflux esophagitis    RLS (restless legs syndrome)    Skin blushing/flushing    Sleep apnea    Sleep disturbance    Stool incontinence    Sonetimes urge comes on very quickly    Stress    Thyroid disease    Had diagnosis of hypothyroid, and then Hyperthyroid and now    Uncomfortable fullness after meals    Unspecified sleep apnea    Visual impairment    glasses    Wears glasses    Weight gain    Weight loss              Past Surgical History:   Procedure Laterality Date    Cholecystectomy  1/2001    gallbladder removed    Colonoscopy  8/14/2013    Procedure: COLONOSCOPY;  Surgeon: Frankel, Jennifer, MD;  Location:  ENDOSCOPY    Colonoscopy  10/3/2014    Procedure: COLONOSCOPY;  Surgeon: Devaughn Hall MD;  Location:  ENDOSCOPY    Colonoscopy  10/2014    repeat in 10 years    Colonoscopy N/A 10/8/2014    Procedure: COLONOSCOPY;  Surgeon: Azar Galeano MD;  Location:  ENDOSCOPY    Colonoscopy N/A 10/2/2023    Procedure: COLONOSCOPY;  Surgeon: Arsen Campbell DO;  Location:  ENDOSCOPY    D & c      x3    Dilation/curettage,diagnostic      Endometrial ablation      Lumpectomy left  1/2006, 1/2009    Other surgical history      benign lump removal    Other surgical history  6/13    colonoscopy    Other surgical history      upper endoscopy    Other surgical history N/A 6/4/2015    Procedure: ESOPHAGOGASTRODUODENOSCOPY (EGD);  Surgeon: Deangelo Gómez MD;  Location:  ENDOSCOPY                Social History     Socioeconomic History    Marital status:    Occupational History    Occupation: [employer]   Tobacco Use    Smoking status: Never     Passive exposure: Past (parents smoked in home when younger)    Smokeless tobacco: Never   Vaping Use     Vaping status: Never Used   Substance and Sexual Activity    Alcohol use: Not Currently     Comment: one drink rarely    Drug use: Never   Other Topics Concern    Caffeine Concern Yes     Comment: coffee, soda-few times per week    Exercise No    Seat Belt Yes     Social Determinants of Health     Financial Resource Strain: Medium Risk (3/21/2022)    [facility]    Overall Financial Resource Strain (CARDIA)     Difficulty of Paying Living Expenses: Somewhat hard   Food Insecurity: Food Insecurity Present (3/21/2022)    [facility]    Hunger Vital Sign     Worried About Running Out of Food in the Last Year: Sometimes true     Ran Out of Food in the Last Year: Never true   Transportation Needs: Unmet Transportation Needs (3/21/2022)    Received from [facility]    PRAPARE - Transportation     Lack of Transportation (Medical): Yes     Lack of Transportation (Non-Medical): No   Physical Activity: Insufficiently Active (3/21/2022)    Exercise Vital Sign     Days of Exercise per Week: 2 days     Minutes of Exercise per Session: 20 min   Stress: Stress Concern Present (3/21/2022)    [facility]    [place] of [employer]ational Health - Occupational Stress Questionnaire     Feeling of Stress : Very much    Social Connections    [facility]              Review of Systems    Positive for stated complaint: dx with pneumonia last week, not feeling better  Other systems are as noted in HPI.  Constitutional and vital signs reviewed.      All other systems reviewed and negative except as noted above.    Physical Exam     ED Triage Vitals [05/01/24 1043]   /83   Pulse 75   Resp 17   Temp 98.2 °F (36.8 °C)   Temp src Oral   SpO2 98 %   O2 Device None (Room air)       Current:BP (!) 165/83   Pulse 61   Temp 98.2 °F (36.8 °C)  (Oral)   Resp 21   Ht 162.6 cm (5' 4\")   Wt 134.3 kg   SpO2 100%   BMI 50.81 kg/m²         Physical Exam    General: Patient is awake, alert in no acute distress.   HEENT: Sclera are not icteric.  Conjunctivae within normal limits.  Mucous members are moist.   Cardiovascular: Regular rate and rhythm, normal S1-S2.  Respiratory: Lungs are clear to auscultation bilaterally.  No wheezes rales or rhonchi.  Abdomen: Soft, nontender, nondistended.  Extremities: No edema.  No unilateral calf swelling or calf tenderness to palpation.  Skin: warm and dry, no diaphoresis  ED Course     Labs Reviewed   COMP METABOLIC PANEL (14) - Abnormal; Notable for the following components:       Result Value    BUN 5 (*)     AST 13 (*)     Alkaline Phosphatase 123 (*)     Albumin 3.0 (*)     A/G Ratio 0.7 (*)     All other components within normal limits   CBC W/ DIFFERENTIAL - Abnormal; Notable for the following components:    MCV 79.7 (*)     All other components within normal limits   TROPONIN I HIGH SENSITIVITY - Normal   PRO BETA NATRIURETIC PEPTIDE - Normal   TROPONIN I HIGH SENSITIVITY - Normal   CBC WITH DIFFERENTIAL WITH PLATELET    Narrative:     The following orders were created for panel order CBC With Differential With Platelet.  Procedure                               Abnormality         Status                     ---------                               -----------         ------                     CBC W/ DIFFERENTIAL[[id number]]          Abnormal            Final result                 Please view results for these tests on the individual orders.   RAINBOW DRAW LAVENDER   RAINBOW DRAW LIGHT GREEN   RAINBOW DRAW BLUE     EKG    Rate, intervals and axes as noted on EKG Report.  Rate: 74  Rhythm: Sinus Rhythm  Reading: Left bundle branch block seen on previous.  No ST elevation or depression otherwise.  Normal QT C at 497.  No dysrhythmia.           CTA chest: I personally reviewed the radiographs and my individual  interpretation shows no consolidation.  I also reviewed the official report which showed aneurysm or visible dissection.  Mild chronic atelectasis in the medial aspect of the right middle lobe.  Somewhat limited due to timing of bolus but no PE is identified.                 MDM          Previous records reviewed as noted in HPI    Differential includes, but is not limited to, STEMI, PE, viral syndrome    Review of any laboratory testing: CBC, CMP essentially unremarkable.  Troponin normal x 2.  proBNP normal.     Review of any radiographic studies: CTA chest somewhat limited due to contrast timing but no clear PE or other acute findings.    Shared decision making with the patient.  Workup reassuring.  Do not suspect PE, STEMI, ACS.  Some of her symptoms do seem more longstanding.  Advised patient to follow-up with cardiology, consider pulmonology.                                Medical Decision Making      Disposition and Plan     Clinical Impression:  1. Shortness of breath    2. Chest heaviness         Disposition:  Discharge  5/1/2024  1:49 pm    Follow-up:  Trish Garcia MD  [address] [address] [phone]    Schedule an appointment as soon as possible for a visit in 1 week(s)      [facility] [address]  [facility] [address]  Schedule an appointment as soon as possible for a visit in 1 week(s)            Medications Prescribed:  Current Discharge Medication List

## 2024-05-01 NOTE — ED INITIAL ASSESSMENT (HPI)
DIFFICULTY BREATHING PRESSURE IN CHEST AND DIZZINESS  FINISHED ABX FOR PNEUMONIA   TAKING INHALER   STILL NO IMPROVEMENT  FATIGUED

## 2024-05-01 NOTE — ED INITIAL ASSESSMENT (HPI)
Pt in from urgent care complains of difficulty breathing which started getting worse 3-4 weeks ago. Pt was seen at urgent care and dx with pneumonia. Pt was put on antibiotics, steroids, and an inhaler and states it's not working. Urgent care did another x-ray today. Pt also states she has had pain in her left calf that is not there anymore but is concerned for a PE. Pt dyspneic with exertion and at rest.

## 2024-05-01 NOTE — DISCHARGE INSTRUCTIONS
Return for new or worsening symptoms such as fever, coughing up blood    Consider seeing a pulmonologist (lung specialist) for further evaluation of your symptoms

## 2024-05-01 NOTE — ED PROVIDER NOTES
Patient Seen in: [facility]      History     Chief Complaint   Patient presents with    Cough/URI     Stated Complaint: SOB    Subjective:   HPI    CHIEF COMPLAINT: Shortness of breath and dizziness, recent treatment for pneumonia     HISTORY OF PRESENT ILLNESS: Patient is a 53-year-old female who presents for evaluation of shortness of breath and dizziness as well as chest pressure.  She states that symptoms have been present for at least a week.  She was diagnosed with pneumonia a little over a week ago.  Was treated with dual antibiotic therapy.  Had some improvement in pneumonia symptoms, but continues to have chest pressure, shortness of breath and dizziness.  No fever or chills.  No vomiting or diarrhea.  States she has some calf soreness.  History of bilateral DVTs about a decade ago.  States she is not sure if that is why her calves hurt but it did cause her concern.     REVIEW OF SYSTEMS:  Constitutional: no fever, no chills  Eyes: no discharge  ENT: no sore throat  Cardiovascular: As above  Respiratory: As above  Gastrointestinal: no abdominal pain, no vomiting  Genitourinary: no hematuria  Musculoskeletal: no back pain  Skin: no rashes  Neurological: no headache     Otherwise a complete review of systems was obtained and other than the HPI was negative     The patient's medication list, past medical history and social history elements is as listed in today's nurse's notes are reviewed and agree. The patient's family history is reviewed and is noncontributory to the presenting problem, except as indicated as above.    Objective:   Past Medical History:    Abdominal distention    Abdominal pain    Anemia    Have had anemia several times    Anxiety    Arthritis    Autoimmune disease (HCC)    Back pain    Back problem    low back pain    Bad breath    Belching    Bloating    Blurred vision    Chest pain    Chest pain on exertion    Constipation    DEPRESSION    Depression    Diarrhea,  unspecified    Dizziness    DVT (deep venous thrombosis) (HCC)    bilateral    Easy bruising    Endocrine disorder    Esophageal reflux    Essential hypertension    Essential hypertension, benign    Exposure to TB    Fatigue    Feeling lonely    Fibromyalgia    joint pain aches pain stiffness    Flatulence/gas pain/belching    Food intolerance    Off and on    Frequent urination    Gastritis    GERD    Headache disorder    migraines, injections    HEADACHES    migraine    Hearing loss    Heartburn    Heavy menses    No periods since ablation in 2014    Hemorrhoids    High cholesterol    History of blood clots    legs bilat    History of depression    History of mental disorder    History of miscarriage    Hoarseness, chronic    Hx of motion sickness    HYPOTHYROIDISM    IBS (irritable bowel syndrome)    Indigestion    Insomnia    Irregular bowel habits    Itch of skin    Leaking of urine    Leg swelling    Loss of appetite    Menses painful    Had ablation 2014    Migraines    Nausea    Night sweats    OA (osteoarthritis)    right knee    Obesity    TAYLOR (obstructive sleep apnea)    cpap    Osteoarthrosis, unspecified whether generalized or localized, unspecified site    Pain in joints    Pain with bowel movements    Phlebitis and thrombophlebitis of other deep vessels of lower extremities    Popliteal synovial cyst    Post partum depression    Reflux esophagitis    RLS (restless legs syndrome)    Skin blushing/flushing    Sleep apnea    Sleep disturbance    Stool incontinence    Sonetimes urge comes on very quickly    Stress    Thyroid disease    Had diagnosis of hypothyroid, and then Hyperthyroid and now    Uncomfortable fullness after meals    Unspecified sleep apnea    Visual impairment    glasses    Wears glasses    Weight gain    Weight loss              Past Surgical History:   Procedure Laterality Date    Cholecystectomy  1/2001    gallbladder removed    Colonoscopy  8/14/2013    Procedure: COLONOSCOPY;   Surgeon: Frankel, Jennifer, MD;  Location:  ENDOSCOPY    Colonoscopy  10/3/2014    Procedure: COLONOSCOPY;  Surgeon: Devaughn Hall MD;  Location:  ENDOSCOPY    Colonoscopy  10/2014    repeat in 10 years    Colonoscopy N/A 10/8/2014    Procedure: COLONOSCOPY;  Surgeon: Azar Galeano MD;  Location:  ENDOSCOPY    Colonoscopy N/A 10/2/2023    Procedure: COLONOSCOPY;  Surgeon: Arsen Campbell DO;  Location:  ENDOSCOPY    D & c      x3    Dilation/curettage,diagnostic      Endometrial ablation      Lumpectomy left  1/2006, 1/2009    Other surgical history      benign lump removal    Other surgical history  6/13    colonoscopy    Other surgical history      upper endoscopy    Other surgical history N/A 6/4/2015    Procedure: ESOPHAGOGASTRODUODENOSCOPY (EGD);  Surgeon: Deangelo Gómez MD;  Location:  ENDOSCOPY                Social History     Socioeconomic History    Marital status:    Occupational History    Occupation: [employer]   Tobacco Use    Smoking status: Never     Passive exposure: Past (parents smoked in home when younger)    Smokeless tobacco: Never   Vaping Use    Vaping status: Never Used   Substance and Sexual Activity    Alcohol use: Not Currently     Comment: one drink rarely    Drug use: Never   Other Topics Concern    Caffeine Concern Yes     Comment: coffee, soda-few times per week    Exercise No    Seat Belt Yes     Social Determinants of Health     Financial Resource Strain: Medium Risk (3/21/2022)    Received from [facility]    Overall Financial Resource Strain (CARDIA)     Difficulty of Paying Living Expenses: Somewhat hard   Food Insecurity: Food Insecurity Present (3/21/2022)    [facility]    Hunger Vital Sign     Worried About Running Out of Food in the Last Year: Sometimes true     Ran Out of Food in the Last Year: Never true   Transportation Needs: Unmet  Transportation Needs (3/21/2022)    [facility]    PRAPARE - Transportation     Lack of Transportation (Medical): Yes     Lack of Transportation (Non-Medical): No   Physical Activity: Insufficiently Active (3/21/2022)    Exercise Vital Sign     Days of Exercise per Week: 2 days     Minutes of Exercise per Session: 20 min   Stress: Stress Concern Present (3/21/2022)    [facility] [place] of Occupational Health - Occupational Stress Questionnaire     Feeling of Stress : Very much    Social Connections    [facility] from [facility]              Review of Systems    Positive for stated complaint: SOB  Other systems are as noted in HPI.  Constitutional and vital signs reviewed.      All other systems reviewed and negative except as noted above.    Physical Exam     ED Triage Vitals [05/01/24 0843]   /79   Pulse 82   Resp 24   Temp 98.9 °F (37.2 °C)   Temp src Temporal   SpO2 97 %   O2 Device None (Room air)       Current:/79   Pulse 82   Temp 98.9 °F (37.2 °C) (Temporal)   Resp 24   SpO2 97%         Physical Exam    Vital signs and nursing notes reviewed  General Appearance: No acute distress  Neurological:  A&Ox3,  Gait normal.  Psychiatric: calm and cooperative  Respiratory: CTAB  Cardiovascular: RRR, S1/S2, no m/c/r  Musculoskeletal: Extremities are symmetrical, full range of motion  Skin:  warm and dry, no rashes.   Mild tenderness to palpation of bilateral calves.      ED Course   Labs Reviewed - No data to display  EKG    Rate, intervals and axes as noted on EKG Report.  Rate: Beats per minute  Rhythm: Sinus Rhythm  Reading: Left bundle branch block, when compared to previous EKG from left bundle branch was previously noted. Now with                    XR CHEST PA + LAT CHEST (CPT=71046)    Result Date: 5/1/2024  PROCEDURE:  XR CHEST PA + LAT CHEST (CPT=71046)  INDICATIONS:  SOB   COMPARISON:  [facility], XR, XR CHEST PA + LAT CHEST (CPT=71046), 4/24/2024, 8:39 AM.  TECHNIQUE:  PA and lateral chest radiographs were obtained.  PATIENT STATED HISTORY: (As transcribed by Technologist)  Congestion causing shortness of breath. Inhaler not helping . Previous cxr 4-24-24    FINDINGS:  The cardiomediastinal silhouette is within normal limits.  There is no consolidation, effusion, or pneumothorax.  No aggressive osseous lesions are identified.            CONCLUSION: No acute cardiopulmonary abnormality.   LOCATION:  [facility]   Dictated by (CST): Sergio Fernandes MD on 5/01/2024 at 9:37 AM     Finalized by (CST): Sergio Fernandes MD on 5/01/2024 at 9:37 AM       XR CHEST PA + LAT CHEST (CPT=71046)    Result Date: 4/24/2024  PROCEDURE:  XR CHEST PA + LAT CHEST (CPT=71046)  INDICATIONS:  headaches sinus difficulty breathing fatigue  COMPARISON:  [facility] , XR, XR CHEST AP PORTABLE  (CPT=71045), 3/22/2023, 11:39 PM.  TECHNIQUE:  PA and lateral chest radiographs were obtained.  PATIENT STATED HISTORY: (As transcribed by Technologist)  Headaches sinus difficulty breathing fatigue Pt began 4-5 weeks ago with headaches,  She now has severe nasal congestion    .            CONCLUSION:   Normal cardiac and mediastinal contours.  Hazy opacification of the lower lungs likely represent superimposition of breast tissue.  The lungs and pleural spaces are clear.  Minimal degenerative changes of the spine.   LOCATION:  [id number]   Dictated by (CST): Tulio Avelar MD on 4/24/2024 at 8:50 AM     Finalized by (CST): Taye Olivera MD on 4/24/2024 at 8:58 AM               MDM      53-year-old female presents for evaluation of dizziness, shortness of breath, chest pressure and history of recent pneumonia.  Also reports bilateral calf pain.  She had an EKG that showed left bundle branch block.  Previous EKG also showed a left bundle branch block.  Chest x-ray was clear.  Discussed with patient the limitations of the immediate  care, including inability to perform more advanced imaging or to do blood work that is necessary to workup her shortness of breath.  I offered her ambulance transfer to the emergency department and she declines.  She stable to make medical decisions.  She states she will drive herself to the emergency department.  Patient instructed to go directly to the ER for further evaluation and care.  She voiced understanding to the treatment plan.  All questions answered.  Case discussed with Dr. Mcintyre, ED physician covering immediate care, who agrees with disposition and plan.                                   Medical Decision Making      Disposition and Plan     Clinical Impression:  1. History of pneumonia    2. Dizziness    3. Shortness of breath    4. Bilateral calf pain         Disposition:  Ic to ed  5/1/2024  9:49 am    Follow-up:  No follow-up provider specified.        Medications Prescribed:  Discharge Medication List as of 5/1/2024  9:49 AM

## 2024-05-02 LAB
ATRIAL RATE: 74 BPM
ATRIAL RATE: 75 BPM
P AXIS: 47 DEGREES
P AXIS: 54 DEGREES
P-R INTERVAL: 150 MS
P-R INTERVAL: 150 MS
Q-T INTERVAL: 448 MS
Q-T INTERVAL: 456 MS
QRS DURATION: 154 MS
QRS DURATION: 158 MS
QTC CALCULATION (BEZET): 497 MS
QTC CALCULATION (BEZET): 509 MS
R AXIS: 0 DEGREES
R AXIS: 12 DEGREES
T AXIS: 119 DEGREES
T AXIS: 120 DEGREES
VENTRICULAR RATE: 74 BPM
VENTRICULAR RATE: 75 BPM

## 2024-05-13 ENCOUNTER — OFFICE VISIT (OUTPATIENT)
Dept: INTERNAL MEDICINE CLINIC | Facility: CLINIC | Age: 54
End: 2024-05-13
Payer: COMMERCIAL

## 2024-05-13 VITALS
WEIGHT: 293 LBS | HEIGHT: 63.5 IN | SYSTOLIC BLOOD PRESSURE: 132 MMHG | BODY MASS INDEX: 51.27 KG/M2 | RESPIRATION RATE: 16 BRPM | HEART RATE: 88 BPM | DIASTOLIC BLOOD PRESSURE: 78 MMHG | TEMPERATURE: 98 F

## 2024-05-13 DIAGNOSIS — G43.909 MIGRAINE WITHOUT STATUS MIGRAINOSUS, NOT INTRACTABLE, UNSPECIFIED MIGRAINE TYPE: ICD-10-CM

## 2024-05-13 DIAGNOSIS — I10 ESSENTIAL HYPERTENSION: ICD-10-CM

## 2024-05-13 DIAGNOSIS — I44.7 LEFT BUNDLE BRANCH BLOCK (LBBB): ICD-10-CM

## 2024-05-13 DIAGNOSIS — K21.9 GASTROESOPHAGEAL REFLUX DISEASE, UNSPECIFIED WHETHER ESOPHAGITIS PRESENT: Primary | ICD-10-CM

## 2024-05-13 DIAGNOSIS — M79.7 FIBROMYALGIA: ICD-10-CM

## 2024-05-13 DIAGNOSIS — E78.2 MIXED HYPERLIPIDEMIA: ICD-10-CM

## 2024-05-13 DIAGNOSIS — R73.03 PREDIABETES: ICD-10-CM

## 2024-05-13 DIAGNOSIS — J18.9 PNEUMONIA DUE TO INFECTIOUS ORGANISM, UNSPECIFIED LATERALITY, UNSPECIFIED PART OF LUNG: ICD-10-CM

## 2024-05-13 DIAGNOSIS — K58.1 IRRITABLE BOWEL SYNDROME WITH CONSTIPATION: Chronic | ICD-10-CM

## 2024-05-13 DIAGNOSIS — G47.33 OSA ON CPAP: ICD-10-CM

## 2024-05-13 DIAGNOSIS — F41.9 ANXIETY AND DEPRESSION: ICD-10-CM

## 2024-05-13 DIAGNOSIS — F32.A ANXIETY AND DEPRESSION: ICD-10-CM

## 2024-05-13 PROCEDURE — 3078F DIAST BP <80 MM HG: CPT | Performed by: INTERNAL MEDICINE

## 2024-05-13 PROCEDURE — 3008F BODY MASS INDEX DOCD: CPT | Performed by: INTERNAL MEDICINE

## 2024-05-13 PROCEDURE — 99204 OFFICE O/P NEW MOD 45 MIN: CPT | Performed by: INTERNAL MEDICINE

## 2024-05-13 PROCEDURE — G2211 COMPLEX E/M VISIT ADD ON: HCPCS | Performed by: INTERNAL MEDICINE

## 2024-05-13 PROCEDURE — 3075F SYST BP GE 130 - 139MM HG: CPT | Performed by: INTERNAL MEDICINE

## 2024-05-13 RX ORDER — BUTALBITAL, ACETAMINOPHEN AND CAFFEINE 50; 325; 40 MG/1; MG/1; MG/1
1 TABLET ORAL 3 TIMES DAILY PRN
COMMUNITY
Start: 2024-04-18

## 2024-05-13 RX ORDER — CARIPRAZINE 1.5 MG/1
1 CAPSULE, GELATIN COATED ORAL EVERY MORNING
COMMUNITY
Start: 2024-05-09

## 2024-05-13 RX ORDER — RABEPRAZOLE SODIUM 20 MG/1
20 TABLET, DELAYED RELEASE ORAL DAILY
Qty: 90 TABLET | Refills: 0 | Status: SHIPPED | OUTPATIENT
Start: 2024-05-13

## 2024-05-13 RX ORDER — FLUTICASONE PROPIONATE 50 MCG
1 SPRAY, SUSPENSION (ML) NASAL DAILY
COMMUNITY
Start: 2024-05-03

## 2024-05-13 RX ORDER — AMOXICILLIN AND CLAVULANATE POTASSIUM 875; 125 MG/1; MG/1
1 TABLET, FILM COATED ORAL 2 TIMES DAILY
COMMUNITY
Start: 2024-05-03

## 2024-05-13 RX ORDER — SEMAGLUTIDE 1 MG/.5ML
1 INJECTION, SOLUTION SUBCUTANEOUS WEEKLY
COMMUNITY
Start: 2024-04-03

## 2024-05-13 RX ORDER — METFORMIN HYDROCHLORIDE 500 MG/1
500 TABLET, EXTENDED RELEASE ORAL
COMMUNITY
Start: 2024-02-29

## 2024-05-13 RX ORDER — CYCLOBENZAPRINE HCL 10 MG
10 TABLET ORAL NIGHTLY
Qty: 45 TABLET | Refills: 0 | Status: SHIPPED | OUTPATIENT
Start: 2024-05-13

## 2024-05-13 NOTE — PROGRESS NOTES
[facility]    CHIEF COMPLAINT:    Chief Complaint   Patient presents with    Medication Follow-Up         HISTORY OF PRESENT ILLNESS:  The patient is a 53 year old year old female who presents to establish care. Was [facility] the past few years. Had been seen here almost 10 years ago.    Htn: Taking meds regularly. No chest pain, no shortness of breath.     Hyperlipidemia: on statin. No muscle aches.      Pre diabetes: on metformin.     Sees psych for anxiety and depression. On several meds per psych.     Has ibs. Seeing gi. On hyoscyamine, linzess (not taking), rebeprazole. Seeing gi. She needs a refill on the rabeprazole. She has not had to take linzess as she is now on metformin for prediabetes and weight loss and has less constipation due to that.     Sees the weight loss clinic. On wegovy, topamax, metformin.     Migraines: sees neuro. On eletriptan but not helping much. Now on fioricet but that isn't helping either. Neuro is working on getting ubrelvy for her. Sees dr. Teresita grier.     Sees ent for sinus and ear issues, on zyrtec    Recently diagnosed with pneumonia on 5/1/2024. She saw pulm and is currently on augmentin. They want her on it for a month per pt. She still has some shortness of breath but overall symptoms have improved.   No fever.   [facility] records reviewed. Labs reviewed.     Sees cardiology for hyperlipidemia and LBBB on EKG. She sees dr. Garcia.     Fibromyalgia: on cyclobenzaprine. Takes it prn at night when the pain is really bad.   Also on trazodone at night for insomnia.     Has miquel. Seeing pulm. On cpap?     Past Medical History:   Past Medical History:    Abdominal distention    Abdominal pain    Anemia    Have had anemia several times    Anxiety    Arthritis    Autoimmune disease (HCC)    Back pain    Back problem    low back pain    Bad breath    Belching    Bloating    Blurred vision    Chest pain    Chest pain on exertion    Constipation    DEPRESSION    Depression     Diarrhea, unspecified    Dizziness    DVT (deep venous thrombosis) (HCC)    bilateral    Easy bruising    Endocrine disorder    Esophageal reflux    Essential hypertension    Essential hypertension, benign    Exposure to TB    Fatigue    Feeling lonely    Fibromyalgia    joint pain aches pain stiffness    Flatulence/gas pain/belching    Food intolerance    Off and on    Frequent urination    Gastritis    GERD    Headache disorder    migraines, injections    HEADACHES    migraine    Hearing loss    Heartburn    Heavy menses    No periods since ablation in 2014    Hemorrhoids    High cholesterol    History of blood clots    legs bilat    History of depression    History of mental disorder    History of miscarriage    Hoarseness, chronic    Hx of motion sickness    HYPOTHYROIDISM    IBS (irritable bowel syndrome)    Indigestion    Insomnia    Irregular bowel habits    Itch of skin    Leaking of urine    Leg swelling    Loss of appetite    Menses painful    Had ablation 2014    Migraines    Nausea    Night sweats    OA (osteoarthritis)    right knee    Obesity    TAYLOR (obstructive sleep apnea)    cpap    Osteoarthrosis, unspecified whether generalized or localized, unspecified site    Pain in joints    Pain with bowel movements    Phlebitis and thrombophlebitis of other deep vessels of lower extremities    Popliteal synovial cyst    Post partum depression    Reflux esophagitis    RLS (restless legs syndrome)    Skin blushing/flushing    Sleep apnea    Sleep disturbance    Stool incontinence    Sonetimes urge comes on very quickly    Stress    Thyroid disease    Had diagnosis of hypothyroid, and then Hyperthyroid and now    Uncomfortable fullness after meals    Unspecified sleep apnea    Visual impairment    glasses    Wears glasses    Weight gain    Weight loss        Past Surgical History:   Past Surgical History:   Procedure Laterality Date    Cholecystectomy  1/2001    gallbladder removed    Colonoscopy  8/14/2013     Procedure: COLONOSCOPY;  Surgeon: Frankel, Jennifer, MD;  Location:  ENDOSCOPY    Colonoscopy  10/3/2014    Procedure: COLONOSCOPY;  Surgeon: Devaughn Hall MD;  Location:  ENDOSCOPY    Colonoscopy  10/2014    repeat in 10 years    Colonoscopy N/A 10/8/2014    Procedure: COLONOSCOPY;  Surgeon: Azar Galeano MD;  Location:  ENDOSCOPY    Colonoscopy N/A 10/2/2023    Procedure: COLONOSCOPY;  Surgeon: Arsen Campbell DO;  Location:  ENDOSCOPY    D & c      x3    Dilation/curettage,diagnostic      Endometrial ablation      Lumpectomy left  1/2006, 1/2009    Other surgical history      benign lump removal    Other surgical history  6/13    colonoscopy    Other surgical history      upper endoscopy    Other surgical history N/A 6/4/2015    Procedure: ESOPHAGOGASTRODUODENOSCOPY (EGD);  Surgeon: Deangelo Gómez MD;  Location:  ENDOSCOPY       Current Medications:    Current Outpatient Medications   Medication Sig Dispense Refill    metFORMIN  MG Oral Tablet 24 Hr Take 1 tablet (500 mg total) by mouth daily with breakfast.      WEGOVY 1 MG/0.5ML Subcutaneous Solution Auto-injector Inject 0.5 mL (1 mg total) into the skin once a week.      amoxicillin clavulanate 875-125 MG Oral Tab Take 1 tablet by mouth 2 (two) times daily.      butalbital-acetaminophen-caffeine -40 MG Oral Tab Take 1 tablet by mouth 3 (three) times daily as needed.      VRAYLAR 1.5 MG Oral Cap Take 1 capsule by mouth every morning.      fluticasone propionate 50 MCG/ACT Nasal Suspension 1 spray by Nasal route daily.      RABEprazole Sodium (ACIPHEX) 20 MG Oral Tab EC Take 1 tablet (20 mg total) by mouth daily. 90 tablet 0    cyclobenzaprine 10 MG Oral Tab Take 1 tablet (10 mg total) by mouth nightly. 45 tablet 0    Fezolinetant (VEOZAH) 45 MG Oral Tab Take by mouth daily.      traZODone 25 mg Oral Tab Take 1 Partial Tablet (25 mg total) by mouth nightly.      escitalopram 20 MG Oral Tab Take 1 tablet (20 mg total) by  mouth daily.      topiramate 100 MG Oral Tab Take 1 tablet (100 mg total) by mouth daily.      busPIRone 15 MG Oral Tab Take 1 tablet (15 mg total) by mouth 2 (two) times daily. 60 tablet 2    amLODIPine 5 MG Oral Tab Take 1 tablet (5 mg total) by mouth in the morning and 1 tablet (5 mg total) before bedtime.      hyoscyamine ER 0.375 MG Oral Tablet 12 Hr Take 1 tablet (375 mcg total) by mouth every 12 (twelve) hours as needed for Cramping or Diarrhea. 90 tablet 2    Eletriptan Hydrobromide 40 MG Oral Tab Take 1 tablet (40 mg total) by mouth 2 (two) times daily as needed.      gabapentin 600 MG Oral Tab 1 tablet (600 mg total) 2 (two) times daily.      QULIPTA 60 MG Oral Tab Take 60 mg by mouth at bedtime.      ondansetron 4 MG Oral Tablet Dispersible Take 1 tablet (4 mg total) by mouth every 8 (eight) hours as needed for Nausea.      rosuvastatin 5 MG Oral Tab Take 1 tablet (5 mg total) by mouth nightly.      levocetirizine 5 MG Oral Tab Take 1 tablet (5 mg total) by mouth every evening. (Patient not taking: Reported on 5/13/2024)      linaCLOtide (LINZESS) 290 MCG Oral Cap Take 1 capsule (290 mcg total) by mouth daily. (Patient not taking: Reported on 4/24/2024) 90 capsule 3         Allergies:    Allergies as of 05/13/2024 - Review Complete 05/13/2024   Allergen Reaction Noted    Dust Runny nose and ITCHING 06/29/2012    Losartan Coughing 11/21/2016    Pollen Runny nose and ITCHING 06/29/2012       SOCIAL HISTORY:    Social History     Socioeconomic History    Marital status:      Spouse name: Not on file    Number of children: Not on file    Years of education: Not on file    Highest education level: Not on file   Occupational History    Occupation: [employer]   Tobacco Use    Smoking status: Never     Passive exposure: Past (parents smoked in home when younger)    Smokeless tobacco: Never   Vaping Use    Vaping status: Never Used   Substance and Sexual Activity    Alcohol use: Yes      Comment: appox0-1 a month    Drug use: Never    Sexual activity: Not on file   Other Topics Concern     Service Not Asked    Blood Transfusions Not Asked    Caffeine Concern Yes     Comment: coffee, soda-few times per week    Occupational Exposure Not Asked    Hobby Hazards Not Asked    Sleep Concern Not Asked    Stress Concern Not Asked    Weight Concern Not Asked    Special Diet Not Asked    Back Care Not Asked    Exercise No    Bike Helmet Not Asked    Seat Belt Yes    Self-Exams Not Asked   Social History Narrative    Not on file     Social Determinants of Health     Financial Resource Strain: Medium Risk (3/21/2022)    [facility]    Overall Financial Resource Strain (CARDIA)     Difficulty of Paying Living Expenses: Somewhat hard   Food Insecurity: Food Insecurity Present (3/21/2022)    [facility]    Hunger Vital Sign     Worried About Running Out of Food in the Last Year: Sometimes true     Ran Out of Food in the Last Year: Never true   Transportation Needs: Unmet Transportation Needs (3/21/2022)    [facility] from [facility]    PRAPARE - Transportation     Lack of Transportation (Medical): Yes     Lack of Transportation (Non-Medical): No   Physical Activity: Insufficiently Active (3/21/2022)    Exercise Vital Sign     Days of Exercise per Week: 2 days     Minutes of Exercise per Session: 20 min   Stress: Stress Concern Present (3/21/2022)    [facility]    [employer] [place] [employer] - Occupational Stress Questionnaire     Feeling of Stress : Very much   Social Connections: Moderately Isolated (2/15/2023)    Social Connections     Frequency of Socialization with Friends and Family: Not on file   Housing Stability: At Risk (8/18/2023)    [facility]     Living Situation: Not on file     Housing  Problems: Not on file       FAMILY HISTORY:   Family History   Problem Relation Age of Onset    Heart Disorder Father         stents    Alcohol and Other Disorders Associated Father         Saurabh duffy    Other (Other) Father     Other (CAD) Father         s    Heart Attack Father     Psychiatric Mother     Arthritis Mother         RA    Bipolar Disorder Mother         valium; lithium; multiple    Suicide History Mother         atttempts    Hypertension Mother     Stroke Mother         Two strokes    Mental Disorder Mother     Anxiety Sister     Depression Sister     Other (thyroid) Sister     Anxiety Daughter     Depression Daughter         zoloft    Other (impulse control do/adhd?) Son     Heart Disorder Other         paternal    Bipolar Disorder Other     Depression Other         maternal    ADHD Other     Anxiety Other        REVIEW OF SYSTEMS:  GENERAL: feels well otherwise  SKIN: denies any unusual skin lesions  EYES:denies blurred vision or double vision  HEENT: denies nasal congestion  LUNGS: see hpi  CARDIOVASCULAR: denies chest pain on exertion  GI: see hpi  : denies dysuria, vaginal discharge or itching  MUSCULOSKELETAL: denies back pain  NEURO: see hpi  PSYCHE: see hpi  HEMATOLOGIC: denies hx of anemia  ENDOCRINE: denies thyroid history  ALL/ASTHMA: denies hx of allergy or asthma      PAIN ASSESSMENT (Patient reports Pain):Yes       FALL ASSESSMENT:    Falls in the last 12 months: No    FUNCTIONAL ASSESSMENT (Able to perform all ADLs):  Yes    BODY HABITUS AND NUTRITION STATUS:  Body mass index is 51.96 kg/m².    DEPRESSION ASSESSMENT:  sees psychiatry    PHYSICAL EXAM:   /78 (BP Location: Left arm, Patient Position: Sitting, Cuff Size: other)   Pulse 88   Temp 97.6 °F (36.4 °C) (Temporal)   Resp 16   Ht 5' 3.5\" (1.613 m)   Wt 298 lb (135.2 kg)   Breastfeeding No   BMI 51.96 kg/m²  Body mass index is 51.96 kg/m².   GENERAL: well developed, well nourished,in no apparent  distress  HEENT: Oropharynx normal. No tonsillar enlargement or exudates.   EYES:PERRLA, EOMI,conjunctiva are clear  NECK: supple,no adenopathy,no bruits  LUNGS: clear to auscultation  CARDIO: RRR without murmur  GI: good BS's,no masses, HSM or tenderness  MUSCULOSKELETAL: back is not tender,FROM of the back  EXTREMITIES: no cyanosis, clubbing or edema  NEURO: Oriented times three,cranial nerves are intact,motor and sensory are grossly intact    Labs:   Lab Results   Component Value Date/Time    WBC 7.8 05/01/2024 11:01 AM    HGB 13.1 05/01/2024 11:01 AM    .0 05/01/2024 11:01 AM      Lab Results   Component Value Date/Time    GLU 93 05/01/2024 11:01 AM     05/01/2024 11:01 AM    K 4.0 05/01/2024 11:01 AM     05/01/2024 11:01 AM    CO2 27.0 05/01/2024 11:01 AM    CREATSERUM 0.87 05/01/2024 11:01 AM    CA 9.0 05/01/2024 11:01 AM    ALB 3.0 (L) 05/01/2024 11:01 AM    TP 7.4 05/01/2024 11:01 AM    ALKPHO 123 (H) 05/01/2024 11:01 AM    AST 13 (L) 05/01/2024 11:01 AM    ALT 20 05/01/2024 11:01 AM    BILT 0.4 05/01/2024 11:01 AM    TSH 0.946 02/11/2024 09:54 AM    T4F 0.9 07/26/2022 06:00 PM        Lab Results   Component Value Date/Time    CHOLEST 176 02/11/2024 09:54 AM    HDL 63 (H) 02/11/2024 09:54 AM    TRIG 107 02/11/2024 09:54 AM    LDL 94 02/11/2024 09:54 AM    NONHDLC 113 02/11/2024 09:54 AM       Lab Results   Component Value Date/Time    A1C 5.9 (H) 09/13/2021 06:23 AM      Vitamin D:    Lab Results   Component Value Date    VITD 25.4 (L) 02/11/2024             ASSESSMENT AND PLAN:   1. Gastroesophageal reflux disease, unspecified whether esophagitis present  Refill done for rabeprazole.   follow up with gi for further refills.   - RABEprazole Sodium (ACIPHEX) 20 MG Oral Tab EC; Take 1 tablet (20 mg total) by mouth daily.  Dispense: 90 tablet; Refill: 0    2. Fibromyalgia  Okay take cyclobenzaprine if has a lot of pain as it helps her. Do not take trazodone the days she takes the  cyclobenzaprine.   She verbalizes understanding.   States she tried the 5mg dose in the past and it does not work for her.   - cyclobenzaprine 10 MG Oral Tab; Take 1 tablet (10 mg total) by mouth nightly.  Dispense: 45 tablet; Refill: 0    3. Essential hypertension  Continue amlodipine     4. Mixed hyperlipidemia  Continue crestor    5. Prediabetes  Continue metformin and wegovy.     6. Anxiety and depression  follow up with psych.   Meds per psych.     7. Irritable bowel syndrome with constipation  follow up with gi.     8. Migraine without status migrainosus, not intractable, unspecified migraine type  follow up with neuro.     9. Pneumonia due to infectious organism, unspecified laterality, unspecified part of lung  Cxr and cta chest with no comments of pneumonia. Clinical diagnosis.   Augmentin per pulmonary.     10. Left bundle branch block (LBBB)  follow up with cardiology.     11. TAYLOR on CPAP  follow up with pulmonary.         Return in about 3 months (around 8/13/2024) for physical.      Cortney Warner MD

## 2024-07-09 DIAGNOSIS — M79.7 FIBROMYALGIA: ICD-10-CM

## 2024-07-10 RX ORDER — CYCLOBENZAPRINE HCL 10 MG
10 TABLET ORAL NIGHTLY
Qty: 45 TABLET | Refills: 0 | Status: SHIPPED | OUTPATIENT
Start: 2024-07-10

## 2024-07-10 NOTE — TELEPHONE ENCOUNTER
Last OV relevant to medication: 5/13/24  Last refill date: 5/13/24 #45/refills: 0  When pt was asked to return for OV: 7/15/24  Upcoming appt/reason:   Future Appointments   Date Time Provider Department Center   8/14/2024  9:00 AM Cortney Warner MD [facility]   9/20/2024  9:00 AM Aletha Ramachandran,  [id number]   Was pt informed of any over due labs: utd

## 2024-07-28 ENCOUNTER — HOSPITAL ENCOUNTER (OUTPATIENT)
Age: 54
Discharge: HOME | End: 2024-07-28
Payer: COMMERCIAL

## 2024-07-28 VITALS
OXYGEN SATURATION: 98 % | BODY MASS INDEX: 50.02 KG/M2 | SYSTOLIC BLOOD PRESSURE: 127 MMHG | HEIGHT: 64 IN | RESPIRATION RATE: 18 BRPM | TEMPERATURE: 98 F | WEIGHT: 293 LBS | DIASTOLIC BLOOD PRESSURE: 87 MMHG | HEART RATE: 89 BPM

## 2024-07-28 DIAGNOSIS — L03.115 CELLULITIS OF RIGHT LOWER EXTREMITY: ICD-10-CM
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
## 2024-07-28 PROCEDURE — 99213 OFFICE O/P EST LOW 20 MIN: CPT | Performed by: NURSE PRACTITIONER

## 2024-07-28 RX ORDER — CEFADROXIL 500 MG/1
500 CAPSULE ORAL 2 TIMES DAILY
Qty: 20 CAPSULE | Refills: 0 | Status: SHIPPED | OUTPATIENT
Start: 2024-07-28 | End: 2024-08-07

## 2024-07-28 RX ORDER — PREDNISONE 20 MG/1
40 TABLET ORAL DAILY
Qty: 10 TABLET | Refills: 0 | Status: SHIPPED | OUTPATIENT
Start: 2024-07-28 | End: 2024-08-02

## 2024-07-28 NOTE — ED INITIAL ASSESSMENT (HPI)
Pt here w/ area to right posterior calf and to left ankle with redness, swelling to site itself, onset 3 weeks ago. C/o being extremely itchy

## 2024-07-28 NOTE — ED PROVIDER NOTES
Patient Seen in: [facility]      History     Chief Complaint   Patient presents with    Rash Skin Problem     Stated Complaint: Bug Bite    Subjective:   53-year-old female presents today with history of bug bites to her lower extremities for about a week.  Does have itching.  Started having redness swelling to the right calf at the area of the bites.  Concern for local reaction versus infection.  Denies having pain to the area however.  Denies any fever or chills.  No nausea vomiting.  Alert oriented x 3.  No other symptoms or concerns.  The patient's medication list, past medical history and social history elements as listed in today's nurse's notes were reviewed and agreed (except as otherwise stated in the HPI).  The patient's family history reviewed and determined to be noncontributory to the presenting problem            Objective:   Past Medical History:    Abdominal distention    Abdominal pain    Anemia    Have had anemia several times    Anxiety    Arthritis    Autoimmune disease (HCC)    Back pain    Back problem    low back pain    Bad breath    Belching    Bloating    Blurred vision    Chest pain    Chest pain on exertion    Constipation    DEPRESSION    Depression    Diarrhea, unspecified    Dizziness    DVT (deep venous thrombosis) (HCC)    bilateral    Easy bruising    Endocrine disorder    Esophageal reflux    Essential hypertension    Essential hypertension, benign    Exposure to TB    Fatigue    Feeling lonely    Fibromyalgia    joint pain aches pain stiffness    Flatulence/gas pain/belching    Food intolerance    Off and on    Frequent urination    Gastritis    GERD    Headache disorder    migraines, injections    HEADACHES    migraine    Hearing loss    Heartburn    Heavy menses    No periods since ablation in 2014    Hemorrhoids    High cholesterol    History of blood clots    legs bilat    History of depression    History of mental disorder    History of miscarriage    Hoarseness,  chronic    Hx of motion sickness    HYPOTHYROIDISM    IBS (irritable bowel syndrome)    Indigestion    Insomnia    Irregular bowel habits    Itch of skin    Leaking of urine    Leg swelling    Loss of appetite    Menses painful    Had ablation 2014    Migraines    Nausea    Night sweats    OA (osteoarthritis)    right knee    Obesity    TAYLOR (obstructive sleep apnea)    cpap    Osteoarthrosis, unspecified whether generalized or localized, unspecified site    Pain in joints    Pain with bowel movements    Phlebitis and thrombophlebitis of other deep vessels of lower extremities    Popliteal synovial cyst    Post partum depression    Reflux esophagitis    RLS (restless legs syndrome)    Skin blushing/flushing    Sleep apnea    Sleep disturbance    Stool incontinence    Sonetimes urge comes on very quickly    Stress    Thyroid disease    Had diagnosis of hypothyroid, and then Hyperthyroid and now    Uncomfortable fullness after meals    Unspecified sleep apnea    Visual impairment    glasses    Wears glasses    Weight gain    Weight loss              Past Surgical History:   Procedure Laterality Date    Cholecystectomy  1/2001    gallbladder removed    Colonoscopy  8/14/2013    Procedure: COLONOSCOPY;  Surgeon: Frankel, Jennifer, MD;  Location:  ENDOSCOPY    Colonoscopy  10/3/2014    Procedure: COLONOSCOPY;  Surgeon: Devaughn Hall MD;  Location:  ENDOSCOPY    Colonoscopy  10/2014    repeat in 10 years    Colonoscopy N/A 10/8/2014    Procedure: COLONOSCOPY;  Surgeon: Azar Galeano MD;  Location:  ENDOSCOPY    Colonoscopy N/A 10/2/2023    Procedure: COLONOSCOPY;  Surgeon: Arsen Campbell DO;  Location:  ENDOSCOPY    D & c      x3    Dilation/curettage,diagnostic      Endometrial ablation      Lumpectomy left  1/2006, 1/2009    Other surgical history      benign lump removal    Other surgical history  6/13    colonoscopy    Other surgical history      upper endoscopy    Other surgical history N/A  6/4/2015    Procedure: ESOPHAGOGASTRODUODENOSCOPY (EGD);  Surgeon: Deangelo Gómez MD;  Location:  ENDOSCOPY                Social History     Socioeconomic History    Marital status:    Occupational History    Occupation: [employer]   Tobacco Use    Smoking status: Never     Passive exposure: Past (parents smoked in home when younger)    Smokeless tobacco: Never   Vaping Use    Vaping status: Never Used   Substance and Sexual Activity    Alcohol use: Yes     Comment: appox0-1 a month    Drug use: Never   Other Topics Concern    Caffeine Concern Yes     Comment: coffee, soda-few times per week    Exercise No    Seat Belt Yes     Social Determinants of Health     Financial Resource Strain: Medium Risk (3/21/2022)    [facility], [facility]    Overall Financial Resource Strain (CARDIA)     Difficulty of Paying Living Expenses: Somewhat hard   Food Insecurity: Food Insecurity Present (3/21/2022)    Received from [facility]    Hunger Vital Sign     Worried About Running Out of Food in the Last Year: Sometimes true     Ran Out of Food in the Last Year: Never true   Transportation Needs: Unmet Transportation Needs (3/21/2022)    Received from [facility], [facility]    PRAPARE - Transportation     Lack of Transportation (Medical): Yes     Lack of Transportation (Non-Medical): No   Physical Activity: Insufficiently Active (3/21/2022)    Exercise Vital Sign     Days of Exercise per Week: 2 days     Minutes of Exercise per Session: 20 min   Stress: Stress Concern Present (3/21/2022)    Received from [facility], [facility]    [employer] - Occupational Stress Questionnaire     Feeling of Stress : Very much    Social Connections    [facility]              Review of Systems    Positive for stated Chief Complaint: Rash Skin  Problem    Other systems are as noted in HPI.  Constitutional and vital signs reviewed.      All other systems reviewed and negative except as noted above.    Physical Exam     ED Triage Vitals [07/28/24 1416]   /87   Pulse 89   Resp 18   Temp 97.5 °F (36.4 °C)   Temp src Temporal   SpO2 98 %   O2 Device None (Room air)       Current Vitals:   Vital Signs  BP: 127/87  Pulse: 89  Resp: 18  Temp: 97.5 °F (36.4 °C)  Temp src: Temporal    Oxygen Therapy  SpO2: 98 %  O2 Device: None (Room air)            Physical Exam  Vitals and nursing note reviewed.   Constitutional:       Appearance: Normal appearance.   HENT:      Mouth/Throat:      Mouth: Mucous membranes are moist.   Cardiovascular:      Rate and Rhythm: Normal rate.   Pulmonary:      Effort: Pulmonary effort is normal.   Skin:     General: Skin is warm and dry.      Comments: Multiple healing scabbed over wounds to the lower legs consistent with bug bites.  Does have an area to the right calf with surrounding redness mild swelling.   Neurological:      Mental Status: She is alert and oriented to person, place, and time.               ED Course   Labs Reviewed - No data to display                   MDM     Please note that this report has been produced using speech recognition software and may contain errors related to that system including, but not limited to, errors in grammar, punctuation, and spelling, as well as words and phrases that possibly may have been recognized inappropriately.  If there are any questions or concerns, contact the dictating provider for clarification.                                         Medical Decision Making  Differential diagnosis includes but is not limited to: Local reaction bug bite, cellulitis      Presents today with several bug bites to lower extremities.  Has area of redness swelling to the right calf concern for possible cellulitis.  Patient given prescription for prednisone and Duricef.  Skin care instructions  given.  To take over-the-counter antihistamine to include Pepcid for itching.  Encouraged follow with her primary care physician in 1 week if symptoms do not improve.  Patient verbalized understanding and agreed to plan of care.    Risk  OTC drugs.  Prescription drug management.        Disposition and Plan     Clinical Impression:  1. Bug bite, initial encounter    2. Cellulitis of right lower extremity         Disposition:  Discharge  7/28/2024  2:36 pm    Follow-up:  Cortney Warner MD  [address] [address] [phone]    In 1 week  As needed          Medications Prescribed:  Current Discharge Medication List        START taking these medications    Details   predniSONE 20 MG Oral Tab Take 2 tablets (40 mg total) by mouth daily for 5 days.  Qty: 10 tablet, Refills: 0      cefadroxil 500 MG Oral Cap Take 1 capsule (500 mg total) by mouth 2 (two) times daily for 10 days.  Qty: 20 capsule, Refills: 0

## 2024-09-28 NOTE — PROGRESS NOTES
[facility]    CHIEF COMPLAINT:   Chief Complaint   Patient presents with    Routine Physical     4/6/21-pap. 4/24/23-mammo. 10/2/23-colon-repeat 10 years         HPI:   Jennifer Wills is a 53 year old female who presents for a complete physical exam. Symptoms: denies discharge, itching, burning or dysuria.     Pap done 4/2021 negative with negative hpv. In ce. Done by gyne Dr. Mckenzie.   Mammo done 8/2023, in ce. Due now. She will reach out to gyne for an order.   Colonoscopy done 8/2023, repeat in 10 years. No polyps.     Up to date tdap, shingrix, prevnar 20  Get covid booster at her local pharmacy.     Exercise: rowing, walking, not very consistent.     Derm: she has a dermatologist, she will make an appt.     Reviewed notes from pulm, neuro, gi since her last visit here. Also cardiology.     She thinks she may have lipidema in her legs. Has always has legs that are disproportionately bigger than her torso. Wonders if she can get this evaluated.      diagnosed with ALS. Has a lot going on at home.       Wt Readings from Last 6 Encounters:   09/30/24 (!) 305 lb 3.2 oz (138.4 kg)   07/28/24 298 lb (135.2 kg)   06/10/24 292 lb (132.5 kg)   05/13/24 298 lb (135.2 kg)   05/01/24 296 lb (134.3 kg)   04/24/24 298 lb (135.2 kg)     Body mass index is 52.39 kg/m².       Current Outpatient Medications   Medication Sig Dispense Refill    gabapentin 300 MG Oral Cap Take by mouth nightly.      erenumab-aooe 70 MG/ML SC Inject 1 mL (70 mg total) into the skin every 30 (thirty) days.      methylphenidate ER 27 MG Oral Tab CR Take 1 tablet (27 mg total) by mouth every morning.      WEGOVY 2.4 MG/0.75ML Subcutaneous Solution Auto-injector       CYCLOBENZAPRINE 10 MG Oral Tab TAKE 1 TABLET(10 MG) BY MOUTH EVERY NIGHT 45 tablet 0    linaCLOtide (LINZESS) 145 MCG Oral Cap Take 145 mcg by mouth daily. (Patient taking differently: Take 145 mcg by mouth daily. TAkes PRN) 90 capsule 3    metFORMIN  MG Oral  Tablet 24 Hr Take 1 tablet (500 mg total) by mouth 2 (two) times daily with meals.      butalbital-acetaminophen-caffeine -40 MG Oral Tab Take 1 tablet by mouth 3 (three) times daily as needed.      VRAYLAR 1.5 MG Oral Cap Take 1 capsule by mouth every morning.      fluticasone propionate 50 MCG/ACT Nasal Suspension 1 spray by Nasal route daily.      RABEprazole Sodium (ACIPHEX) 20 MG Oral Tab EC Take 1 tablet (20 mg total) by mouth daily. 90 tablet 0    Fezolinetant (VEOZAH) 45 MG Oral Tab Take by mouth daily.      traZODone 25 mg Oral Tab Take 1 Partial Tablet (25 mg total) by mouth nightly.      escitalopram 20 MG Oral Tab Take 1 tablet (20 mg total) by mouth daily.      levocetirizine 5 MG Oral Tab Take 1 tablet (5 mg total) by mouth every evening. Has been taking in the morning      amLODIPine 5 MG Oral Tab Take 1 tablet (5 mg total) by mouth in the morning and 1 tablet (5 mg total) before bedtime.      Eletriptan Hydrobromide 40 MG Oral Tab Take 1 tablet (40 mg total) by mouth 2 (two) times daily as needed.      gabapentin 600 MG Oral Tab 1 tablet (600 mg total) 2 (two) times daily.      ondansetron 4 MG Oral Tablet Dispersible Take 1 tablet (4 mg total) by mouth every 8 (eight) hours as needed for Nausea.      rosuvastatin 5 MG Oral Tab Take 1 tablet (5 mg total) by mouth nightly.        Past Medical History:    Abdominal distention    Abdominal pain    Anemia    Have had anemia several times    Anxiety    Arthritis    Autoimmune disease (HCC)    Back pain    Back problem    low back pain    Bad breath    Belching    Bloating    Blurred vision    Chest pain    Chest pain on exertion    Constipation    DEPRESSION    Depression    Diarrhea, unspecified    Dizziness    DVT (deep venous thrombosis) ([facility])    bilateral    Easy bruising    Endocrine disorder    Esophageal reflux    Essential hypertension    Essential hypertension, benign    Exposure to TB    Fatigue    Feeling lonely    Fibromyalgia    joint  pain aches pain stiffness    Flatulence/gas pain/belching    Food intolerance    Off and on    Frequent urination    Gastritis    GERD    Headache disorder    migraines, injections    HEADACHES    migraine    Hearing loss    Heartburn    Heavy menses    No periods since ablation in 2014    Hemorrhoids    High cholesterol    History of blood clots    legs bilat    History of depression    History of mental disorder    History of miscarriage    Hoarseness, chronic    Hx of motion sickness    HYPOTHYROIDISM    IBS (irritable bowel syndrome)    Indigestion    Insomnia    Irregular bowel habits    Itch of skin    Leaking of urine    Leg swelling    Loss of appetite    Menses painful    Had ablation 2014    Migraines    Nausea    Night sweats    OA (osteoarthritis)    right knee    Obesity    TAYLOR (obstructive sleep apnea)    cpap    Osteoarthrosis, unspecified whether generalized or localized, unspecified site    Pain in joints    Pain with bowel movements    Phlebitis and thrombophlebitis of other deep vessels of lower extremities    Popliteal synovial cyst    Post partum depression    Reflux esophagitis    RLS (restless legs syndrome)    Skin blushing/flushing    Sleep apnea    Sleep disturbance    Stool incontinence    Sonetimes urge comes on very quickly    Stress    Thyroid disease    Had diagnosis of hypothyroid, and then Hyperthyroid and now    Uncomfortable fullness after meals    Unspecified sleep apnea    Visual impairment    glasses    Wears glasses    Weight gain    Weight loss      Past Surgical History:   Procedure Laterality Date    Cholecystectomy  1/2001    gallbladder removed    Colonoscopy  8/14/2013    Procedure: COLONOSCOPY;  Surgeon: Frankel, Jennifer, MD;  Location:  ENDOSCOPY    Colonoscopy  10/3/2014    Procedure: COLONOSCOPY;  Surgeon: Devaughn Hall MD;  Location:  ENDOSCOPY    Colonoscopy  10/2014    repeat in 10 years    Colonoscopy N/A 10/8/2014    Procedure: COLONOSCOPY;  Surgeon:  Azar Galeano MD;  Location:  ENDOSCOPY    Colonoscopy N/A 10/2/2023    Procedure: COLONOSCOPY;  Surgeon: Arsen Campbell DO;  Location:  ENDOSCOPY    D & c      x3    Dilation/curettage,diagnostic      Endometrial ablation      Lumpectomy left  1/2006, 1/2009    Other surgical history      benign lump removal    Other surgical history  6/13    colonoscopy    Other surgical history      upper endoscopy    Other surgical history N/A 6/4/2015    Procedure: ESOPHAGOGASTRODUODENOSCOPY (EGD);  Surgeon: Deangelo Gómez MD;  Location:  ENDOSCOPY      Family History   Problem Relation Age of Onset    Heart Disorder Father         stents    Alcohol and Other Disorders Associated Father         Saurabh not stankey    Other (Other) Father     Other (CAD) Father         s    Heart Attack Father     Psychiatric Mother     Arthritis Mother         RA    Bipolar Disorder Mother         valium; lithium; multiple    Suicide History Mother         atttempts    Hypertension Mother     Stroke Mother         Two strokes    Mental Disorder Mother     Anxiety Sister     Depression Sister     Other (thyroid) Sister     Anxiety Daughter     Depression Daughter         zoloft    Other (impulse control do/adhd?) Son     Heart Disorder Other         paternal    Bipolar Disorder Other     Depression Other         maternal    ADHD Other     Anxiety Other       Social History:   Social History     Socioeconomic History    Marital status:    Occupational History    Occupation: [employer]   Tobacco Use    Smoking status: Never     Passive exposure: Past (parents smoked in home when younger)    Smokeless tobacco: Never   Vaping Use    Vaping status: Never Used   Substance and Sexual Activity    Alcohol use: Yes     Comment: appox0-1 a month    Drug use: Never   Other Topics Concern    Caffeine Concern Yes     Comment: coffee, soda-few times per week    Exercise No    Seat Belt Yes     Social Determinants  of Health     Financial Resource Strain: Medium Risk (3/21/2022)    [facility], [facility]    Overall Financial Resource Strain (CARDIA)     Difficulty of Paying Living Expenses: Somewhat hard   Food Insecurity: Food Insecurity Present (3/21/2022)    Received from [facility]    Hunger Vital Sign     Worried About Running Out of Food in the Last Year: Sometimes true     Ran Out of Food in the Last Year: Never true   Transportation Needs: Unmet Transportation Needs (3/21/2022)    Received from [facility]    PRAPARE - Transportation     Lack of Transportation (Medical): Yes     Lack of Transportation (Non-Medical): No   Physical Activity: Insufficiently Active (3/21/2022)    Exercise Vital Sign     Days of Exercise per Week: 2 days     Minutes of Exercise per Session: 20 min   Stress: Stress Concern Present (3/21/2022)    Received from [facility]    [employer] - Occupational Stress Questionnaire     Feeling of Stress : Very much    Social Connections    [facility]     : yes. Children: yes.   Exercise: minimal.  Diet: watches minimally     REVIEW OF SYSTEMS:   GENERAL: feels well otherwise  SKIN: denies any unusual skin lesions  EYES:denies blurred vision or double vision  HEENT: denies nasal congestion, sinus pain or ST  LUNGS: denies shortness of breath with exertion  CARDIOVASCULAR: denies chest pain on exertion  GI: denies abdominal pain,denies heartburn  : denies dysuria, vaginal discharge or itching  MUSCULOSKELETAL: denies back pain  NEURO: denies headaches  PSYCHE: denies depression or anxiety  HEMATOLOGIC: denies hx of anemia  ENDOCRINE: denies thyroid history  ALL/ASTHMA: denies hx of allergy or asthma    EXAM:   /82 (BP Location: Right arm, Patient Position: Sitting, Cuff Size: other)   Pulse  72   Temp 97.3 °F (36.3 °C) (Temporal)   Resp 16   Ht 5' 4\" (1.626 m)   Wt (!) 305 lb 3.2 oz (138.4 kg)   Breastfeeding No   BMI 52.39 kg/m²   Body mass index is 52.39 kg/m².   GENERAL: well developed, well nourished,in no apparent distress  SKIN: no rashes,no suspicious lesions  HEENT: atraumatic, normocephalic,ears and throat are clear  EYES:PERRLA, conjunctiva are clear  NECK: supple,no adenopathy,no bruits  CHEST: no chest tenderness  LUNGS: clear to auscultation  CARDIO: nl s1 and s2, RRR without murmur  GI: good BS's,no masses, HSM or tenderness  BREAST:Deferred. To be done at gyne.    GENITAL/URINARY:  Deferred. To be done at gyne.    MUSCULOSKELETAL: back is not tender,FROM of the back  EXTREMITIES: no cyanosis, clubbing or edema  NEURO: Oriented times three,cranial nerves are intact,motor and sensory are grossly intact    Labs:   Lab Results   Component Value Date/Time    WBC 7.8 05/01/2024 11:01 AM    HGB 13.1 05/01/2024 11:01 AM    .0 05/01/2024 11:01 AM      Lab Results   Component Value Date/Time    GLU 93 05/01/2024 11:01 AM     05/01/2024 11:01 AM    K 4.0 05/01/2024 11:01 AM     05/01/2024 11:01 AM    CO2 27.0 05/01/2024 11:01 AM    CREATSERUM 0.87 05/01/2024 11:01 AM    CA 9.0 05/01/2024 11:01 AM    ALB 3.0 (L) 05/01/2024 11:01 AM    TP 7.4 05/01/2024 11:01 AM    ALKPHO 123 (H) 05/01/2024 11:01 AM    AST 13 (L) 05/01/2024 11:01 AM    ALT 20 05/01/2024 11:01 AM    BILT 0.4 05/01/2024 11:01 AM    TSH 0.946 02/11/2024 09:54 AM    T4F 0.9 07/26/2022 06:00 PM        Lab Results   Component Value Date/Time    CHOLEST 176 02/11/2024 09:54 AM    HDL 63 (H) 02/11/2024 09:54 AM    TRIG 107 02/11/2024 09:54 AM    LDL 94 02/11/2024 09:54 AM    NONHDLC 113 02/11/2024 09:54 AM       Lab Results   Component Value Date/Time    A1C 5.9 (H) 09/13/2021 06:23 AM      Vitamin D:    Lab Results   Component Value Date    VITD 25.4 (L) 02/11/2024           ASSESSMENT AND PLAN:   Jennifer Wills  is a 53 year old female who presents for a complete physical exam.   1. Physical exam, annual  Labs done by various specialists reviewed.   Recheck cmp as alk phos was elevated.   follow up with gyne as planned. Mammo per gyne.   Up to date colonoscopy.   Immunizations discussed.   Urged regular exercise.   See her various specialists as planned.   She questioned the rabeprazole, discussed that the refills for this should come from gi since she is seeing them for IBS as well.   - Comp Metabolic Panel (14) [E]; Future  Will reach out to pt re lipidema: she can see dr. Chela Nagy.     2. Need for vaccination  - INFLUENZA VACCINE, TRI, PRESERV FREE, 0.5 ML      Return in about 1 year (around 9/30/2025) for physical.      Cortney Warner MD

## 2024-09-30 ENCOUNTER — OFFICE VISIT (OUTPATIENT)
Dept: INTERNAL MEDICINE CLINIC | Facility: CLINIC | Age: 54
End: 2024-09-30
Payer: COMMERCIAL

## 2024-09-30 VITALS
HEART RATE: 72 BPM | WEIGHT: 293 LBS | TEMPERATURE: 97 F | DIASTOLIC BLOOD PRESSURE: 82 MMHG | HEIGHT: 64 IN | RESPIRATION RATE: 16 BRPM | BODY MASS INDEX: 50.02 KG/M2 | SYSTOLIC BLOOD PRESSURE: 132 MMHG

## 2024-09-30 DIAGNOSIS — Z23 NEED FOR VACCINATION: ICD-10-CM

## 2024-09-30 DIAGNOSIS — Z00.00 PHYSICAL EXAM, ANNUAL: ICD-10-CM

## 2024-09-30 PROCEDURE — 3079F DIAST BP 80-89 MM HG: CPT | Performed by: INTERNAL MEDICINE

## 2024-09-30 PROCEDURE — 99396 PREV VISIT EST AGE 40-64: CPT | Performed by: INTERNAL MEDICINE

## 2024-09-30 PROCEDURE — 90656 IIV3 VACC NO PRSV 0.5 ML IM: CPT | Performed by: INTERNAL MEDICINE

## 2024-09-30 PROCEDURE — 3075F SYST BP GE 130 - 139MM HG: CPT | Performed by: INTERNAL MEDICINE

## 2024-09-30 PROCEDURE — 3008F BODY MASS INDEX DOCD: CPT | Performed by: INTERNAL MEDICINE

## 2024-09-30 PROCEDURE — 90471 IMMUNIZATION ADMIN: CPT | Performed by: INTERNAL MEDICINE

## 2024-09-30 RX ORDER — GABAPENTIN 300 MG/1
CAPSULE ORAL NIGHTLY
COMMUNITY
Start: 2024-08-20

## 2024-09-30 RX ORDER — METHYLPHENIDATE HYDROCHLORIDE 27 MG/1
27 TABLET ORAL EVERY MORNING
COMMUNITY

## 2024-09-30 RX ORDER — SEMAGLUTIDE 2.4 MG/.75ML
INJECTION, SOLUTION SUBCUTANEOUS
COMMUNITY
Start: 2024-09-20

## 2024-10-24 ENCOUNTER — MED REC SCAN ONLY (OUTPATIENT)
Dept: INTERNAL MEDICINE CLINIC | Facility: CLINIC | Age: 54
End: 2024-10-24

## 2024-10-28 ENCOUNTER — HOSPITAL ENCOUNTER (OUTPATIENT)
Age: 54
Discharge: HOME | End: 2024-10-28
Payer: COMMERCIAL

## 2024-10-28 VITALS
DIASTOLIC BLOOD PRESSURE: 82 MMHG | OXYGEN SATURATION: 97 % | HEART RATE: 96 BPM | RESPIRATION RATE: 18 BRPM | SYSTOLIC BLOOD PRESSURE: 174 MMHG | TEMPERATURE: 100 F

## 2024-10-28 DIAGNOSIS — T78.40XA ALLERGIC REACTION, INITIAL ENCOUNTER: ICD-10-CM

## 2024-10-28 DIAGNOSIS — J01.40 ACUTE PANSINUSITIS, RECURRENCE NOT SPECIFIED: Primary | ICD-10-CM

## 2024-10-28 PROCEDURE — 99213 OFFICE O/P EST LOW 20 MIN: CPT | Performed by: NURSE PRACTITIONER

## 2024-10-28 RX ORDER — DOXYCYCLINE 100 MG/1
100 CAPSULE ORAL 2 TIMES DAILY
Qty: 14 CAPSULE | Refills: 0 | Status: SHIPPED | OUTPATIENT
Start: 2024-10-28 | End: 2024-11-04

## 2024-10-28 RX ORDER — PREDNISONE 20 MG/1
60 TABLET ORAL ONCE
Status: COMPLETED | OUTPATIENT
Start: 2024-10-28 | End: 2024-10-28

## 2024-10-28 RX ORDER — FAMOTIDINE 20 MG/1
20 TABLET, FILM COATED ORAL 2 TIMES DAILY
Qty: 10 TABLET | Refills: 0 | Status: SHIPPED | OUTPATIENT
Start: 2024-10-28 | End: 2024-11-02

## 2024-10-28 RX ORDER — PREDNISONE 20 MG/1
60 TABLET ORAL DAILY
Qty: 12 TABLET | Refills: 0 | Status: SHIPPED | OUTPATIENT
Start: 2024-10-28 | End: 2024-11-01

## 2024-10-28 RX ORDER — DIPHENHYDRAMINE HCL 25 MG
25 CAPSULE ORAL ONCE
Status: COMPLETED | OUTPATIENT
Start: 2024-10-28 | End: 2024-10-28

## 2024-10-28 RX ORDER — FAMOTIDINE 20 MG/1
20 TABLET, FILM COATED ORAL ONCE
Status: COMPLETED | OUTPATIENT
Start: 2024-10-28 | End: 2024-10-28

## 2024-10-28 NOTE — DISCHARGE INSTRUCTIONS
Please take doxycycline twice a day.  Prednisone once a day.  Pepcid twice a day.  Over-the-counter Benadryl every 4-6 hours.  Follow closely with your primary.  ER if worse.

## 2024-10-28 NOTE — ED PROVIDER NOTES
Patient Seen in: Immediate Care [facility]      History     Chief Complaint   Patient presents with    Fever    Edema     Stated Complaint:     Subjective:   This a 53-year-old female with below stated medical history.  Presents to immediate care with multiple complaints.  Reports she has been sick over the past week with nasal congestion and sinus pain and pressure.  States yesterday she developed a fever of 103 °F.  Reports she took Tylenol and then a few hours later took a dose of Sudafed.  45 minutes after taking a dose of Sudafed her eyes and forehead became swollen and red.  She denies any sore throat.  She denies any lip or throat swelling.  No difficulty breathing or wheezing.  No coughing, chest pain, or shortness of breath.  No other treatment attempted prior to arrival.    The history is provided by the patient.             Objective:     Past Medical History:    Abdominal distention    Abdominal pain    Anemia    Have had anemia several times    Anxiety    Arthritis    Autoimmune disease (HCC)    Back pain    Back problem    low back pain    Bad breath    Belching    Bloating    Blurred vision    Chest pain    Chest pain on exertion    Constipation    DEPRESSION    Depression    Diarrhea, unspecified    Dizziness    DVT (deep venous thrombosis) ([facility])    bilateral    Easy bruising    Endocrine disorder    Esophageal reflux    Essential hypertension    Essential hypertension, benign    Exposure to TB    Fatigue    Feeling lonely    Fibromyalgia    joint pain aches pain stiffness    Flatulence/gas pain/belching    Food intolerance    Off and on    Frequent urination    Gastritis    GERD    Headache disorder    migraines, injections    HEADACHES    migraine    Hearing loss    Heartburn    Heavy menses    No periods since ablation in 2014    Hemorrhoids    High cholesterol    History of blood clots    legs bilat    History of depression    History of mental disorder    History of miscarriage     Hoarseness, chronic    Hx of motion sickness    HYPOTHYROIDISM    IBS (irritable bowel syndrome)    Indigestion    Insomnia    Irregular bowel habits    Itch of skin    Leaking of urine    Leg swelling    Loss of appetite    Menses painful    Had ablation 2014    Migraines    Nausea    Night sweats    OA (osteoarthritis)    right knee    Obesity    TAYLOR (obstructive sleep apnea)    cpap    Osteoarthrosis, unspecified whether generalized or localized, unspecified site    Pain in joints    Pain with bowel movements    Phlebitis and thrombophlebitis of other deep vessels of lower extremities    Popliteal synovial cyst    Post partum depression    Reflux esophagitis    RLS (restless legs syndrome)    Skin blushing/flushing    Sleep apnea    Sleep disturbance    Stool incontinence    Sonetimes urge comes on very quickly    Stress    Thyroid disease    Had diagnosis of hypothyroid, and then Hyperthyroid and now    Uncomfortable fullness after meals    Unspecified sleep apnea    Visual impairment    glasses    Wears glasses    Weight gain    Weight loss              Past Surgical History:   Procedure Laterality Date    Cholecystectomy  1/2001    gallbladder removed    Colonoscopy  8/14/2013    Procedure: COLONOSCOPY;  Surgeon: Frankel, Jennifer, MD;  Location:  ENDOSCOPY    Colonoscopy  10/3/2014    Procedure: COLONOSCOPY;  Surgeon: Devaughn Hall MD;  Location:  ENDOSCOPY    Colonoscopy  10/2014    repeat in 10 years    Colonoscopy N/A 10/8/2014    Procedure: COLONOSCOPY;  Surgeon: Azar Galeano MD;  Location:  ENDOSCOPY    Colonoscopy N/A 10/2/2023    Procedure: COLONOSCOPY;  Surgeon: Arsen Campbell DO;  Location:  ENDOSCOPY    D & c      x3    Dilation/curettage,diagnostic      Endometrial ablation      Lumpectomy left  1/2006, 1/2009    Other surgical history      benign lump removal    Other surgical history  6/13    colonoscopy    Other surgical history      upper endoscopy    Other surgical  history N/A 6/4/2015    Procedure: ESOPHAGOGASTRODUODENOSCOPY (EGD);  Surgeon: Deangelo Gómez MD;  Location:  ENDOSCOPY                Social History     Socioeconomic History    Marital status:    Occupational History    Occupation: [employer]   Tobacco Use    Smoking status: Never     Passive exposure: Past (parents smoked in home when younger)    Smokeless tobacco: Never   Vaping Use    Vaping status: Never Used   Substance and Sexual Activity    Alcohol use: Yes     Comment: appox0-1 a month    Drug use: Never   Other Topics Concern    Caffeine Concern Yes     Comment: coffee, soda-few times per week    Exercise No    Seat Belt Yes     Social Drivers of Health     Financial Resource Strain: Medium Risk (3/21/2022)    [facility], [facility]    Overall Financial Resource Strain (CARDIA)     Difficulty of Paying Living Expenses: Somewhat hard   Food Insecurity: Food Insecurity Present (3/21/2022)    [facility] from [facility], [facility]    Hunger Vital Sign     Worried About Running Out of Food in the Last Year: Sometimes true     Ran Out of Food in the Last Year: Never true   Transportation Needs: Unmet Transportation Needs (3/21/2022)    Received from [facility], [facility]    PRAPARE - Transportation     Lack of Transportation (Medical): Yes     Lack of Transportation (Non-Medical): No   Physical Activity: Insufficiently Active (3/21/2022)    Exercise Vital Sign     Days of Exercise per Week: 2 days     Minutes of Exercise per Session: 20 min   Stress: Stress Concern Present (3/21/2022)    Received from [facility], [facility]    [employer] [place] [employer] - Occupational Stress Questionnaire     Feeling of Stress : Very much    Social Connections    [facility] [employer]              Review of Systems   Constitutional:  Positive for chills  and fever.   HENT:  Positive for congestion, facial swelling, sinus pressure and sinus pain. Negative for sore throat.    Respiratory:  Negative for cough, shortness of breath and wheezing.    Cardiovascular:  Negative for chest pain, palpitations and leg swelling.   Gastrointestinal:  Negative for abdominal pain, constipation, diarrhea, nausea and vomiting.   Genitourinary:  Negative for dysuria.   Musculoskeletal:  Negative for back pain, neck pain and neck stiffness.   Skin:  Positive for rash.   Neurological:  Negative for headaches.   Hematological:  Does not bruise/bleed easily.       Positive for stated complaint:   Other systems are as noted in HPI.  Constitutional and vital signs reviewed.      All other systems reviewed and negative except as noted above.    Physical Exam     ED Triage Vitals [10/28/24 0807]   BP (!) 174/82   Pulse 96   Resp 18   Temp 99.9 °F (37.7 °C)   Temp src Temporal   SpO2 97 %   O2 Device None (Room air)       Current Vitals:   Vital Signs  BP: (!) 174/82  Pulse: 96  Resp: 18  Temp: 99.9 °F (37.7 °C)  Temp src: Temporal    Oxygen Therapy  SpO2: 97 %  O2 Device: None (Room air)        Physical Exam  Vitals and nursing note reviewed.   Constitutional:       General: She is not in acute distress.     Appearance: Normal appearance. She is obese. She is not ill-appearing, toxic-appearing or diaphoretic.   HENT:      Head: Normocephalic and atraumatic.      Right Ear: Tympanic membrane, ear canal and external ear normal. There is no impacted cerumen.      Left Ear: Tympanic membrane, ear canal and external ear normal. There is no impacted cerumen.      Nose: Congestion and rhinorrhea present.      Mouth/Throat:      Mouth: Mucous membranes are moist. No angioedema.      Pharynx: Oropharynx is clear. Uvula midline. Posterior oropharyngeal erythema present. No pharyngeal swelling, oropharyngeal exudate, uvula swelling or postnasal drip.      Tonsils: No tonsillar exudate or tonsillar  abscesses.   Eyes:      General:         Right eye: No discharge.         Left eye: No discharge.      Extraocular Movements: Extraocular movements intact.      Conjunctiva/sclera: Conjunctivae normal.   Cardiovascular:      Rate and Rhythm: Normal rate and regular rhythm.      Heart sounds: Normal heart sounds. No murmur heard.  Pulmonary:      Effort: Pulmonary effort is normal. No respiratory distress.      Breath sounds: Normal breath sounds. No stridor. No wheezing, rhonchi or rales.   Musculoskeletal:      Cervical back: Neck supple.      Right lower leg: No edema.      Left lower leg: No edema.   Skin:     General: Skin is warm and dry.      Capillary Refill: Capillary refill takes less than 2 seconds.      Findings: No rash.   Neurological:      Mental Status: She is alert and oriented to person, place, and time.   Psychiatric:         Mood and Affect: Mood normal.         Behavior: Behavior normal.             ED Course   Labs Reviewed - No data to display         Dose of prednisone, Pepcid, Benadryl.  DC home.       MDM        Vital signs stable.  Patient is well-appearing and nontoxic looking.  Presents to immediate care for complaint of nasal congestion with sinus pain and pressure and fever as well as allergic reaction.    Differential diagnosis includes but is not limited to viral sinusitis, bacterial sinusitis, COVID, other viral URI, allergic reaction to medication, angioedema, anaphylaxis    Patient is swollen around around bilateral orbits.  No evidence of angioedema or anaphylaxis.    She has been sick with sinus symptoms for greater than 1 week and has developed a fever yesterday.  Concern for bacterial sinusitis.    Dose of prednisone, Pepcid, Benadryl given here in the clinic.    DC home.  Rx given for doxycycline, prednisone, and Pepcid.  Advised to continue to take over-the-counter Benadryl report 6 hours.  Close follow-up with PCP.  Reasons to seek emergent reevaluation in the ED reviewed.   She verbalized understanding, and agreed with plan of care.  All questions answered.        Medical Decision Making      Disposition and Plan     Clinical Impression:  1. Acute pansinusitis, recurrence not specified    2. Allergic reaction, initial encounter         Disposition:  Discharge  10/28/2024  8:17 am    Follow-up:  Cortney Warner MD  [address] [address]  [phone]    In 3 days            Medications Prescribed:  Current Discharge Medication List        START taking these medications    Details   doxycycline 100 MG Oral Cap Take 1 capsule (100 mg total) by mouth 2 (two) times daily for 7 days.  Qty: 14 capsule, Refills: 0      predniSONE 20 MG Oral Tab Take 3 tablets (60 mg total) by mouth daily for 4 days.  Qty: 12 tablet, Refills: 0      famotidine (PEPCID) 20 MG Oral Tab Take 1 tablet (20 mg total) by mouth 2 (two) times daily for 5 days.  Qty: 10 tablet, Refills: 0                 Supplementary Documentation:

## 2024-10-28 NOTE — ED INITIAL ASSESSMENT (HPI)
10/27 Pt started with a fever and congestion.  Took Sudafed and tylenol.   000 Pt woke with facial swelling/redness    Denies: SOB/diff breathing, new exposures.

## 2024-10-30 ENCOUNTER — OFFICE VISIT (OUTPATIENT)
Dept: INTERNAL MEDICINE CLINIC | Facility: CLINIC | Age: 54
End: 2024-10-30
Payer: COMMERCIAL

## 2024-10-30 ENCOUNTER — PATIENT MESSAGE (OUTPATIENT)
Dept: INTERNAL MEDICINE CLINIC | Facility: CLINIC | Age: 54
End: 2024-10-30

## 2024-10-30 VITALS
HEIGHT: 64 IN | BODY MASS INDEX: 50.02 KG/M2 | WEIGHT: 293 LBS | RESPIRATION RATE: 20 BRPM | DIASTOLIC BLOOD PRESSURE: 80 MMHG | SYSTOLIC BLOOD PRESSURE: 122 MMHG | OXYGEN SATURATION: 98 % | HEART RATE: 82 BPM | TEMPERATURE: 98 F

## 2024-10-30 DIAGNOSIS — T78.3XXS ANGIOEDEMA, SEQUELA: Primary | ICD-10-CM

## 2024-10-30 DIAGNOSIS — J01.00 ACUTE MAXILLARY SINUSITIS, RECURRENCE NOT SPECIFIED: ICD-10-CM

## 2024-10-30 PROCEDURE — 3074F SYST BP LT 130 MM HG: CPT | Performed by: INTERNAL MEDICINE

## 2024-10-30 PROCEDURE — 3079F DIAST BP 80-89 MM HG: CPT | Performed by: INTERNAL MEDICINE

## 2024-10-30 PROCEDURE — 3008F BODY MASS INDEX DOCD: CPT | Performed by: INTERNAL MEDICINE

## 2024-10-30 PROCEDURE — 99214 OFFICE O/P EST MOD 30 MIN: CPT | Performed by: INTERNAL MEDICINE

## 2024-10-30 PROCEDURE — G2211 COMPLEX E/M VISIT ADD ON: HCPCS | Performed by: INTERNAL MEDICINE

## 2024-10-30 RX ORDER — PREDNISONE 20 MG/1
TABLET ORAL
Qty: 7 TABLET | Refills: 0 | Status: SHIPPED | OUTPATIENT
Start: 2024-10-30

## 2024-10-30 RX ORDER — CHLORTHALIDONE 25 MG/1
25 TABLET ORAL DAILY
COMMUNITY
Start: 2024-10-29

## 2024-10-30 NOTE — PROGRESS NOTES
[facility]    CHIEF COMPLAINT:    Chief Complaint   Patient presents with    Urgent Care F/u     10/28/24 allergic reaction to medication Sudafed.   States that was sick over the past week with nasal congestion and sinus pain+ pressure fever of 103 ° took the Sudafed at 4 pm woke at 12 am with face swelling, both eye watering, and itchiness just to the face. Still has puffiness + redness around both eyes .    8/18/23 Mammo, 10/12/23 Colon 10 yrs repeat.          HISTORY OF PRESENT ILLNESS:  here for follow up IC visit. She was seen there after she developed angioedema likely as a reaction to sudafed.    She had a uri for a week and had chills, runny nose, sinus congestion. About 3 days ago she developed a fever. Took tylenol for the fever in the am and did not have any reaction. That evening she took some sudafed because of sinus congestion. She woke up 4 hours later and her left eye was swollen. Over the next few hours both her eyes were swollen, she had facial swelling and pain. She had some vision problems due to swelling. She went to the IC. Was given prednisone and pepcid there orally. She had some sinus tenderness and there was some concern for bacterial sinusitis as well. She was discharged with prednisone 60mg daily for 4 days and pepcid and doxycycline.     She is better but the swelling of the eyes is still there. There is erythema around her eyes and there is itching around her eyes still.     No tongue, lip or throat swelling.         REVIEW OF SYSTEMS:  See HPI    Current Medications:    Current Outpatient Medications   Medication Sig Dispense Refill    chlorthalidone 25 MG Oral Tab Take 1 tablet (25 mg total) by mouth daily.      predniSONE 20 MG Oral Tab 40mg daily for 2 days, 20mg daily for 2 days, then 10mg daily for 2 days then stop. 7 tablet 0    doxycycline 100 MG Oral Cap Take 1 capsule (100 mg total) by mouth 2 (two) times daily for 7 days. 14 capsule 0    predniSONE 20 MG Oral Tab  Take 3 tablets (60 mg total) by mouth daily for 4 days. 12 tablet 0    famotidine (PEPCID) 20 MG Oral Tab Take 1 tablet (20 mg total) by mouth 2 (two) times daily for 5 days. 10 tablet 0    RABEprazole Sodium (ACIPHEX) 20 MG Oral Tab EC Take 1 tablet (20 mg total) by mouth daily. 90 tablet 2    gabapentin 300 MG Oral Cap Take by mouth nightly.      erenumab-aooe 70 MG/ML SC Inject 1 mL (70 mg total) into the skin every 30 (thirty) days.      methylphenidate ER 27 MG Oral Tab CR Take 1 tablet (27 mg total) by mouth every morning.      WEGOVY 2.4 MG/0.75ML Subcutaneous Solution Auto-injector       CYCLOBENZAPRINE 10 MG Oral Tab TAKE 1 TABLET(10 MG) BY MOUTH EVERY NIGHT 45 tablet 0    linaCLOtide (LINZESS) 145 MCG Oral Cap Take 145 mcg by mouth daily. 90 capsule 3    metFORMIN  MG Oral Tablet 24 Hr Take 1 tablet (500 mg total) by mouth 2 (two) times daily with meals.      butalbital-acetaminophen-caffeine -40 MG Oral Tab Take 1 tablet by mouth 3 (three) times daily as needed.      VRAYLAR 1.5 MG Oral Cap Take 1 capsule by mouth every morning.      Fezolinetant (VEOZAH) 45 MG Oral Tab Take by mouth daily.      traZODone 25 mg Oral Tab Take 1 Partial Tablet (25 mg total) by mouth nightly.      escitalopram 20 MG Oral Tab Take 1 tablet (20 mg total) by mouth daily.      levocetirizine 5 MG Oral Tab Take 1 tablet (5 mg total) by mouth every evening. Has been taking in the morning      amLODIPine 5 MG Oral Tab Take 1 tablet (5 mg total) by mouth in the morning and 1 tablet (5 mg total) before bedtime.      Eletriptan Hydrobromide 40 MG Oral Tab Take 1 tablet (40 mg total) by mouth 2 (two) times daily as needed.      gabapentin 600 MG Oral Tab 1 tablet (600 mg total) 2 (two) times daily.      ondansetron 4 MG Oral Tablet Dispersible Take 1 tablet (4 mg total) by mouth every 8 (eight) hours as needed for Nausea.      rosuvastatin 5 MG Oral Tab Take 1 tablet (5 mg total) by mouth nightly.         PAST MEDICAL,  SOCIAL, AND FAMILY HISTORY:  Tobacco:    History   Smoking Status    Never   Smokeless Tobacco    Never       PHYSICAL EXAM:  /80 (BP Location: Right arm, Patient Position: Sitting, Cuff Size: large)   Pulse 82   Temp 98 °F (36.7 °C) (Temporal)   Resp 20   Ht 5' 4\" (1.626 m)   Wt (!) 305 lb (138.3 kg)   SpO2 98%   BMI 52.35 kg/m²    GENERAL: well developed, well nourished,in no apparent distress  HEENT: Oropharynx normal. No tonsillar enlargement or exudates. No tongue, lip, uvular swelling. No erythema. Has sinus tenderness to percussion on the maxillary sinuses.   EYES:swelling of upper and lower eyelids and areas surrounding the eyes, erythema present, no open ulcers or wounds.   NECK: no lad  LUNGS: clear to auscultation  CARDIO: RRR without murmur    DATA:  Results for orders placed or performed during the hospital encounter of 05/01/24   EKG 12 Lead    Collection Time: 05/01/24 10:44 AM   Result Value Ref Range    Ventricular rate 74 BPM    Atrial rate 74 BPM    P-R Interval 150 ms    QRS Duration 154 ms    Q-T Interval 448 ms    QTC Calculation (Bezet) 497 ms    P Axis 47 degrees    R Axis 0 degrees    T Axis 119 degrees   Comp Metabolic Panel (14)    Collection Time: 05/01/24 11:01 AM   Result Value Ref Range    Glucose 93 70 - 99 mg/dL    Sodium 138 136 - 145 mmol/L    Potassium 4.0 3.5 - 5.1 mmol/L    Chloride 107 98 - 112 mmol/L    CO2 27.0 21.0 - 32.0 mmol/L    Anion Gap 4 0 - 18 mmol/L    BUN 5 (L) 9 - 23 mg/dL    Creatinine 0.87 0.55 - 1.02 mg/dL    Calcium, Total 9.0 8.5 - 10.1 mg/dL    Calculated Osmolality 283 275 - 295 mOsm/kg    eGFR-Cr 79 >=60 mL/min/1.73m2    AST 13 (L) 15 - 37 U/L    ALT 20 13 - 56 U/L    Alkaline Phosphatase 123 (H) 41 - 108 U/L    Bilirubin, Total 0.4 0.1 - 2.0 mg/dL    Total Protein 7.4 6.4 - 8.2 g/dL    Albumin 3.0 (L) 3.4 - 5.0 g/dL    Globulin  4.4 2.8 - 4.4 g/dL    A/G Ratio 0.7 (L) 1.0 - 2.0   Troponin I (High Sensitivity)    Collection Time: 05/01/24 11:01  AM   Result Value Ref Range    Troponin I (High Sensitivity) <3 <=54 ng/L   Pro Beta Natriuretic Peptide    Collection Time: 05/01/24 11:01 AM   Result Value Ref Range    Pro-Beta Natriuretic Peptide 74 <125 pg/mL   RAINBOW DRAW LAVENDER    Collection Time: 05/01/24 11:01 AM   Result Value Ref Range    Hold Lavender Auto Resulted    RAINBOW DRAW LIGHT GREEN    Collection Time: 05/01/24 11:01 AM   Result Value Ref Range    Hold Lt Green Auto Resulted    RAINBOW DRAW BLUE    Collection Time: 05/01/24 11:01 AM   Result Value Ref Range    Hold Blue Auto Resulted    CBC W/ DIFFERENTIAL    Collection Time: 05/01/24 11:01 AM   Result Value Ref Range    WBC 7.8 4.0 - 11.0 x10(3) uL    RBC 4.93 3.80 - 5.30 x10(6)uL    HGB 13.1 12.0 - 16.0 g/dL    HCT 39.3 35.0 - 48.0 %    .0 150.0 - 450.0 10(3)uL    MCV 79.7 (L) 80.0 - 100.0 fL    MCH 26.6 26.0 - 34.0 pg    MCHC 33.3 31.0 - 37.0 g/dL    RDW 14.2 %    Neutrophil Absolute Prelim 5.67 1.50 - 7.70 x10 (3) uL    Neutrophil Absolute 5.67 1.50 - 7.70 x10(3) uL    Lymphocyte Absolute 1.19 1.00 - 4.00 x10(3) uL    Monocyte Absolute 0.68 0.10 - 1.00 x10(3) uL    Eosinophil Absolute 0.19 0.00 - 0.70 x10(3) uL    Basophil Absolute 0.02 0.00 - 0.20 x10(3) uL    Immature Granulocyte Absolute 0.04 0.00 - 1.00 x10(3) uL    Neutrophil % 72.8 %    Lymphocyte % 15.3 %    Monocyte % 8.7 %    Eosinophil % 2.4 %    Basophil % 0.3 %    Immature Granulocyte % 0.5 %   Troponin I (High Sensitivity)    Collection Time: 05/01/24  1:13 PM   Result Value Ref Range    Troponin I (High Sensitivity) <3 <=54 ng/L            ASSESSMENT AND PLAN:  1. Angioedema, sequela  Likely a reaction to sudafed.   Still with quite a bit of swelling around the eyes and she has discomfort due to that.   Continue the prednisone given to her by the IC and then will taper it. Given a rx.   Continue pepcid. Continue benadryl, does not make her drowsy.   Discussed epipen but there is an interaction between the epipen and  the allergy to sudafed. Will have her see allergy to confirm she can still use the epipen if needed.   If any worsening symptoms go back to the er.   - predniSONE 20 MG Oral Tab; 40mg daily for 2 days, 20mg daily for 2 days, then 10mg daily for 2 days then stop.  Dispense: 7 tablet; Refill: 0  - Allergy Referral - In Network    2. Acute maxillary sinusitis, recurrence not specified  Finish doxycycline.         Return if symptoms worsen or fail to improve.      Cortney Warner MD

## 2024-10-31 NOTE — TELEPHONE ENCOUNTER
Ideally she should not have to wait that long for the appt. Can see see first available at that office? APRN would be fine too. Otherwise can try dr. Perkins with [facility].

## 2024-11-11 DIAGNOSIS — M79.7 FIBROMYALGIA: ICD-10-CM

## 2024-11-13 RX ORDER — CYCLOBENZAPRINE HCL 10 MG
10 TABLET ORAL NIGHTLY
Qty: 45 TABLET | Refills: 0 | Status: SHIPPED | OUTPATIENT
Start: 2024-11-13

## 2024-11-13 NOTE — TELEPHONE ENCOUNTER
Last OV relevant to medication:   5/13/24 for fibromyalgia-  taking this PRN pain  9/30 physical-  10/30/2024- F-up-  another PCP for wt.management    Last refill date: 7/10/24     #/refills: 45  When pt was asked to return for OV: 3 mo for physical which she did  Upcoming appt/reason: 10/6/2025 physical  Was pt informed of any over due labs: done per other specialists

## 2024-11-20 ENCOUNTER — OFFICE VISIT (OUTPATIENT)
Age: 54
End: 2024-11-20

## 2024-11-20 VITALS
HEIGHT: 64 IN | DIASTOLIC BLOOD PRESSURE: 90 MMHG | WEIGHT: 293 LBS | RESPIRATION RATE: 16 BRPM | SYSTOLIC BLOOD PRESSURE: 160 MMHG | OXYGEN SATURATION: 95 % | BODY MASS INDEX: 50.02 KG/M2 | HEART RATE: 99 BPM

## 2024-11-20 DIAGNOSIS — T78.2XXA ANAPHYLAXIS, INITIAL ENCOUNTER: Primary | ICD-10-CM

## 2024-11-20 DIAGNOSIS — T78.3XXA ALLERGIC ANGIOEDEMA, INITIAL ENCOUNTER: ICD-10-CM

## 2024-11-20 DIAGNOSIS — T39.391A ALLERGIC REACTION TO NONSTEROIDAL ANTI-INFLAMMATORY DRUG (NSAID): ICD-10-CM

## 2024-11-20 RX ORDER — ZAVEGEPANT 10 MG/.1ML
1 SPRAY NASAL DAILY PRN
COMMUNITY

## 2024-11-20 RX ORDER — ERENUMAB-AOOE 70 MG/ML
INJECTION SUBCUTANEOUS
COMMUNITY
Start: 2024-11-12

## 2024-11-20 NOTE — PATIENT INSTRUCTIONS
Discussed with patient that there are no allergy skin test to the type of reaction she had.    Patient will avoid aspirin and all nonsteroidal anti-inflammatory drugs, including Advil, Motrin, ibuprofen, Naprosyn, Aleve, Clinoril, Feldene, Voltaren.  Threshold serum levels of nonsteroidal anti-inflammatory drugs are well-known to cause angioedema without urticaria.  Other medications that can contribute to angioedema without urticaria include ACE inhibitors and angiotensin receptor blockers-patient is not taking either 1 of those.    Patient should be able to tolerate Tylenol-acetaminophen.    If necessary patient should be able to tolerate Celebrex or Mobic-Alonso 2 inhibitors.  She would need prescription for those medications.    If desired, patient could consult an [facility] for oral challenge to the Sudafed PE which is an extremely unlikely culprit in this case.    Meanwhile, encouraged patient to comply with her neurologist suggestion and start Botox injections for her frequent severe migraines to avoid side effects/reactions to medications as above.    Patient will follow-up in our [place] allergy clinic in [facility] in 4 weeks.

## 2024-11-20 NOTE — H&P
Jennifer Wills is a 54 year old female.    HPI:     Chief Complaint   Patient presents with    New Patient     Referred today by PCP for allergy assessment. She took sudafed PE the end of October and that night had severe swelling in face.      Patient is a 54-year-old female referred by Dr. Gibbs for new lengthy, complex consult for anaphylaxis, angioedema of the face with consequent facial excoriation and skin peeling.    There were only a few causes of angioedema without urticaria, which include ACE inhibitors, angiotensin receptor blockers, female hormone replacement therapy, and acquired C1 esterase inhibitor deficiency  Due to occult leukemia, lymphoma, multiple myeloma, or monoclonal myeloma  The most common cause is pseudo allergy to aspirin and nonsteroidal anti-inflammatory drugs, where a threshold serum level triggers angioedema without urticaria.    This patient has uncontrolled severe migraine headaches for which she takes frequent Motrin and ibuprofen.  2 days prior to the initial onset of her diffuse facial angioedema/anaphylaxis patient took 3 Motrin twice a day for 2 to 3 days.  She also took over-the-counter Tylenol and Sudafed PE 4 hours before going to bed the night before her episode-both extremely unlikely culprits.          HISTORY:  Past Medical History:    Abdominal distention    Abdominal pain    Anemia    Have had anemia several times    Anxiety    Arthritis    Autoimmune disease (HCC)    Back pain    Back problem    low back pain    Bad breath    Belching    Bloating    Blurred vision    Chest pain    Chest pain on exertion    Constipation    DEPRESSION    Depression    Diarrhea, unspecified    Dizziness    DVT (deep venous thrombosis) ([facility])    bilateral    Easy bruising    Endocrine disorder    Esophageal reflux    Essential hypertension    Essential hypertension, benign    Exposure to TB    Fatigue    Feeling lonely    Fibromyalgia    joint pain aches pain stiffness     Flatulence/gas pain/belching    Food intolerance    Off and on    Frequent urination    Gastritis    GERD    Headache disorder    migraines, injections    HEADACHES    migraine    Hearing loss    Heartburn    Heavy menses    No periods since ablation in 2014    Hemorrhoids    High cholesterol    History of blood clots    legs bilat    History of depression    History of mental disorder    History of miscarriage    Hoarseness, chronic    Hx of motion sickness    HYPOTHYROIDISM    IBS (irritable bowel syndrome)    Indigestion    Insomnia    Irregular bowel habits    Itch of skin    Leaking of urine    Leg swelling    Loss of appetite    Menses painful    Had ablation 2014    Migraines    Nausea    Night sweats    OA (osteoarthritis)    right knee    Obesity    TAYLOR (obstructive sleep apnea)    cpap    Osteoarthrosis, unspecified whether generalized or localized, unspecified site    Pain in joints    Pain with bowel movements    Phlebitis and thrombophlebitis of other deep vessels of lower extremities    Popliteal synovial cyst    Post partum depression    Reflux esophagitis    RLS (restless legs syndrome)    Skin blushing/flushing    Sleep apnea    Sleep disturbance    Stool incontinence    Sonetimes urge comes on very quickly    Stress    Thyroid disease    Had diagnosis of hypothyroid, and then Hyperthyroid and now    Uncomfortable fullness after meals    Unspecified sleep apnea    Visual impairment    glasses    Wears glasses    Weight gain    Weight loss      Past Surgical History:   Procedure Laterality Date    Cholecystectomy  1/2001    gallbladder removed    Colonoscopy  8/14/2013    Procedure: COLONOSCOPY;  Surgeon: Frankel, Jennifer, MD;  Location:  ENDOSCOPY    Colonoscopy  10/3/2014    Procedure: COLONOSCOPY;  Surgeon: Devaughn Hall MD;  Location:  ENDOSCOPY    Colonoscopy  10/2014    repeat in 10 years    Colonoscopy N/A 10/8/2014    Procedure: COLONOSCOPY;  Surgeon: Azar Galeano MD;  Location:   ENDOSCOPY    Colonoscopy N/A 10/2/2023    Procedure: COLONOSCOPY;  Surgeon: Arsen Campbell DO;  Location:  ENDOSCOPY    D & c      x3    Dilation/curettage,diagnostic      Endometrial ablation      Lumpectomy left  1/2006, 1/2009    Other surgical history      benign lump removal    Other surgical history  6/13    colonoscopy    Other surgical history      upper endoscopy    Other surgical history N/A 6/4/2015    Procedure: ESOPHAGOGASTRODUODENOSCOPY (EGD);  Surgeon: Deangelo Gómez MD;  Location:  ENDOSCOPY      Family History   Problem Relation Age of Onset    Heart Disorder Father         stents    Alcohol and Other Disorders Associated Father         Saurabh not stankey    Other (Other) Father     Other (CAD) Father         s    Heart Attack Father     Psychiatric Mother     Arthritis Mother         RA    Bipolar Disorder Mother         valium; lithium; multiple    Suicide History Mother         atttempts    Hypertension Mother     Stroke Mother         Two strokes    Mental Disorder Mother     Anxiety Sister     Depression Sister     Other (thyroid) Sister     Anxiety Daughter     Depression Daughter         zoloft    Other (impulse control do/adhd?) Son     Heart Disorder Other         paternal    Bipolar Disorder Other     Depression Other         maternal    ADHD Other     Anxiety Other       Social History:   Social History     Socioeconomic History    Marital status:    Occupational History    Occupation: [employer]   Tobacco Use    Smoking status: Never     Passive exposure: Past (parents smoked in home when younger)    Smokeless tobacco: Never   Vaping Use    Vaping status: Never Used   Substance and Sexual Activity    Alcohol use: Yes     Comment: appox0-1 a month    Drug use: Never   Other Topics Concern    Caffeine Concern Yes     Comment: coffee, soda-few times per week    Exercise No    Seat Belt Yes     Social Drivers of Health     Financial Resource  Strain: Medium Risk (3/21/2022)    [facility]    Overall Financial Resource Strain (CARDIA)     Difficulty of Paying Living Expenses: Somewhat hard   Food Insecurity: Food Insecurity Present (3/21/2022)    [facility]    Hunger Vital Sign     Worried About Running Out of Food in the Last Year: Sometimes true     Ran Out of Food in the Last Year: Never true   Transportation Needs: Unmet Transportation Needs (3/21/2022)    [facility] from [facility]    PRAPARE - Transportation     Lack of Transportation (Medical): Yes     Lack of Transportation (Non-Medical): No   Physical Activity: Insufficiently Active (3/21/2022)    Exercise Vital Sign     Days of Exercise per Week: 2 days     Minutes of Exercise per Session: 20 min   Stress: Stress Concern Present (3/21/2022)    [facility]    [employer] - Occupational Stress Questionnaire     Feeling of Stress : Very much    Social Connections    [facility] On license of [facility]        Medications (Active prior to today's visit):  Current Outpatient Medications   Medication Sig Dispense Refill    ZAVZPRET 10 MG/ACT Nasal Solution 1 spray by Nasal route daily as needed.      AIMOVIG 70 MG/ML Subcutaneous       CYCLOBENZAPRINE 10 MG Oral Tab TAKE 1 TABLET(10 MG) BY MOUTH EVERY NIGHT 45 tablet 0    RABEprazole Sodium (ACIPHEX) 20 MG Oral Tab EC Take 1 tablet (20 mg total) by mouth daily. 90 tablet 2    gabapentin 300 MG Oral Cap Take by mouth nightly.      methylphenidate ER 27 MG Oral Tab CR Take 1 tablet (27 mg total) by mouth every morning.      WEGOVY 2.4 MG/0.75ML Subcutaneous Solution Auto-injector       butalbital-acetaminophen-caffeine -40 MG Oral Tab Take 1 tablet by mouth 3 (three) times daily as needed.      VRAYLAR 1.5 MG Oral Cap Take 1 capsule by  mouth every morning.      Fezolinetant (VEOZAH) 45 MG Oral Tab Take by mouth daily.      traZODone 25 mg Oral Tab Take 1 Partial Tablet (25 mg total) by mouth nightly.      escitalopram 20 MG Oral Tab Take 1 tablet (20 mg total) by mouth daily.      levocetirizine 5 MG Oral Tab Take 1 tablet (5 mg total) by mouth every evening. Has been taking in the morning      gabapentin 600 MG Oral Tab 1 tablet (600 mg total) 2 (two) times daily.      ondansetron 4 MG Oral Tablet Dispersible Take 1 tablet (4 mg total) by mouth every 8 (eight) hours as needed for Nausea.      rosuvastatin 5 MG Oral Tab Take 1 tablet (5 mg total) by mouth nightly.      chlorthalidone 25 MG Oral Tab Take 1 tablet (25 mg total) by mouth daily. (Patient not taking: Reported on 11/20/2024)      predniSONE 20 MG Oral Tab 40mg daily for 2 days, 20mg daily for 2 days, then 10mg daily for 2 days then stop. (Patient not taking: Reported on 11/20/2024) 7 tablet 0    linaCLOtide (LINZESS) 145 MCG Oral Cap Take 145 mcg by mouth daily. (Patient not taking: Reported on 11/20/2024) 90 capsule 3    metFORMIN  MG Oral Tablet 24 Hr Take 1 tablet (500 mg total) by mouth 2 (two) times daily with meals. (Patient not taking: Reported on 11/20/2024)      amLODIPine 5 MG Oral Tab Take 1 tablet (5 mg total) by mouth in the morning and 1 tablet (5 mg total) before bedtime. (Patient not taking: Reported on 11/20/2024)      Eletriptan Hydrobromide 40 MG Oral Tab Take 1 tablet (40 mg total) by mouth 2 (two) times daily as needed. (Patient not taking: Reported on 11/20/2024)         Allergies:  Allergies[1]      ROS:   Review of Systems   Constitutional:  Negative for activity change, appetite change, chills, diaphoresis, fatigue, fever and unexpected weight change.   HENT:  Positive for facial swelling. Negative for congestion, ear discharge, ear pain, hearing loss, mouth sores, postnasal drip, rhinorrhea, sinus pressure, sinus pain, sneezing, sore throat, tinnitus,  trouble swallowing and voice change.    Eyes:  Negative for pain, discharge, redness and itching.   Respiratory:  Negative for apnea, cough, choking, chest tightness, shortness of breath, wheezing and stridor.    Cardiovascular:  Negative for chest pain and palpitations.   Gastrointestinal:  Negative for abdominal distention, abdominal pain, constipation, diarrhea, nausea and vomiting.   Endocrine: Negative for cold intolerance and heat intolerance.   Musculoskeletal:  Negative for arthralgias, joint swelling and myalgias.   Skin:  Negative for pallor and rash.        Facial skin excoriation / peeling with angioedema   Allergic/Immunologic: Negative for environmental allergies, food allergies and immunocompromised state.   Neurological:  Negative for headaches.   Psychiatric/Behavioral:  Negative for behavioral problems and sleep disturbance.             PHYSICAL EXAM:   Physical Exam  Constitutional:       Appearance: She is normal weight.   HENT:      Head: No right periorbital erythema or left periorbital erythema.      Right Ear: Tympanic membrane normal. There is no impacted cerumen.      Left Ear: Tympanic membrane normal. There is no impacted cerumen.      Nose: No congestion or rhinorrhea.      Mouth/Throat:      Pharynx: No oropharyngeal exudate or posterior oropharyngeal erythema.   Eyes:      General: Lids are normal. No allergic shiner.        Right eye: No discharge.         Left eye: No discharge.      Conjunctiva/sclera: Conjunctivae normal.      Right eye: Right conjunctiva is not injected. No exudate.     Left eye: Left conjunctiva is not injected. No exudate.  Cardiovascular:      Rate and Rhythm: Normal rate and regular rhythm.      Heart sounds: Normal heart sounds.   Pulmonary:      Effort: No tachypnea, prolonged expiration or respiratory distress.      Breath sounds: No stridor or decreased air movement. No decreased breath sounds, wheezing, rhonchi or rales.   Skin:     Coloration: Skin is  not cyanotic or pale.      Findings: No acne, ecchymosis, erythema, petechiae or rash. Rash is not macular, nodular, papular, purpuric, pustular, scaling, urticarial or vesicular.             ASSESSMENT   Assessment   Encounter Diagnoses   Name Primary?    Anaphylaxis, initial encounter Yes    Allergic angioedema, initial encounter     Allergic reaction to nonsteroidal anti-inflammatory drug (NSAID)        PLAN   We discussed with the patient that there are no allergy skin test for the type of reaction that she endured.    Patient will avoid aspirin and all nonsteroidal anti-inflammatory drugs, including Motrin, ibuprofen, Advil, Nuprin, Aleve, Naprosyn, Clinoril, Feldene, Voltaren.    Patient should tolerate Tylenol-acetaminophen.    If necessary patient should tolerate Celebrex or Mobic-Alonso 2 inhibitors.  She would need prescription for those medications.    If desired, patient could consider consultation at an [facility] Department of allergy for possible oral challenge to the Sudafed PE-an extremely unlikely culprit in this case.  For such allergy challenge Would suggest Department of allergy at [facility] or [facility].    Patient will follow-up in our [place] allergy clinic in [facility] in 4 to 6 weeks.         Orders This Visit:  No orders of the defined types were placed in this encounter.      Meds This Visit:  Requested Prescriptions      No prescriptions requested or ordered in this encounter       Imaging & Referrals:  None     11/20/2024  Kurt Coyle MD      If medication samples were provided today, they were provided solely for patient education and training related to self administration of these medications.  Teaching, instruction and sample was provided to the patient by myself.  Teaching included  a review of potential adverse side effects as well as potential efficacy.  Patient's questions were answered in  regards to medication administration and dosing and potential side effects. Teaching was provided via the teach back method         [1]   Allergies  Allergen Reactions    Sudafed [Pseudoephedrine] ANGIOEDEMA    Dust Runny nose and ITCHING    Losartan Coughing    Pollen Runny nose and ITCHING

## 2024-11-21 RX ORDER — AMLODIPINE BESYLATE 5 MG/1
5 TABLET ORAL 2 TIMES DAILY
Qty: 180 TABLET | Refills: 3 | Status: SHIPPED | OUTPATIENT
Start: 2024-11-21

## 2025-01-31 DIAGNOSIS — M79.7 FIBROMYALGIA: ICD-10-CM

## 2025-02-03 RX ORDER — CYCLOBENZAPRINE HCL 10 MG
10 TABLET ORAL NIGHTLY
Qty: 45 TABLET | Refills: 0 | Status: SHIPPED | OUTPATIENT
Start: 2025-02-03

## 2025-02-03 NOTE — TELEPHONE ENCOUNTER
Last OV relevant to medication: 9/30/24  Last refill date: 11/13/24 #45/refills: 0  When pt was asked to return for OV: 9/30/25  Upcoming appt/reason:   Future Appointments   Date Time Provider Department Center   8/7/2025  1:00 PM Jessy Brown APRN [facility] [address]   10/6/2025  4:20 PM Cortney Warner MD [facility] [address] [facility] [address]   Was pt informed of any over due labs: utd

## 2025-02-13 ENCOUNTER — MED REC SCAN ONLY (OUTPATIENT)
Dept: INTERNAL MEDICINE CLINIC | Facility: CLINIC | Age: 55
End: 2025-02-13

## 2025-02-19 ENCOUNTER — LAB ENCOUNTER (OUTPATIENT)
Dept: LAB | Age: 55
End: 2025-02-19
Attending: INTERNAL MEDICINE
Payer: COMMERCIAL

## 2025-02-19 ENCOUNTER — OFFICE VISIT (OUTPATIENT)
Dept: INTERNAL MEDICINE CLINIC | Facility: CLINIC | Age: 55
End: 2025-02-19
Payer: COMMERCIAL

## 2025-02-19 VITALS
DIASTOLIC BLOOD PRESSURE: 90 MMHG | WEIGHT: 293 LBS | BODY MASS INDEX: 48.82 KG/M2 | TEMPERATURE: 98 F | HEART RATE: 94 BPM | RESPIRATION RATE: 20 BRPM | HEIGHT: 65 IN | OXYGEN SATURATION: 99 % | SYSTOLIC BLOOD PRESSURE: 146 MMHG

## 2025-02-19 DIAGNOSIS — H60.391 OTHER INFECTIVE ACUTE OTITIS EXTERNA OF RIGHT EAR: Primary | ICD-10-CM

## 2025-02-19 DIAGNOSIS — Z00.00 PHYSICAL EXAM, ANNUAL: ICD-10-CM

## 2025-02-19 LAB
ALBUMIN SERPL-MCNC: 4.6 G/DL (ref 3.2–4.8)
ALBUMIN/GLOB SERPL: 1.3 {RATIO} (ref 1–2)
ALP LIVER SERPL-CCNC: 113 U/L
ALT SERPL-CCNC: 21 U/L
ANION GAP SERPL CALC-SCNC: 8 MMOL/L (ref 0–18)
AST SERPL-CCNC: 22 U/L (ref ?–34)
BILIRUB SERPL-MCNC: 0.2 MG/DL (ref 0.3–1.2)
BUN BLD-MCNC: 8 MG/DL (ref 9–23)
CALCIUM BLD-MCNC: 9.8 MG/DL (ref 8.7–10.6)
CHLORIDE SERPL-SCNC: 103 MMOL/L (ref 98–112)
CO2 SERPL-SCNC: 30 MMOL/L (ref 21–32)
CREAT BLD-MCNC: 0.77 MG/DL
EGFRCR SERPLBLD CKD-EPI 2021: 90 ML/MIN/1.73M2 (ref 60–?)
FASTING STATUS PATIENT QL REPORTED: YES
GLOBULIN PLAS-MCNC: 3.5 G/DL (ref 2–3.5)
GLUCOSE BLD-MCNC: 105 MG/DL (ref 70–99)
OSMOLALITY SERPL CALC.SUM OF ELEC: 291 MOSM/KG (ref 275–295)
POTASSIUM SERPL-SCNC: 4 MMOL/L (ref 3.5–5.1)
PROT SERPL-MCNC: 8.1 G/DL (ref 5.7–8.2)
SODIUM SERPL-SCNC: 141 MMOL/L (ref 136–145)

## 2025-02-19 PROCEDURE — 3008F BODY MASS INDEX DOCD: CPT | Performed by: INTERNAL MEDICINE

## 2025-02-19 PROCEDURE — 80053 COMPREHEN METABOLIC PANEL: CPT | Performed by: INTERNAL MEDICINE

## 2025-02-19 PROCEDURE — 99213 OFFICE O/P EST LOW 20 MIN: CPT | Performed by: INTERNAL MEDICINE

## 2025-02-19 PROCEDURE — 3077F SYST BP >= 140 MM HG: CPT | Performed by: INTERNAL MEDICINE

## 2025-02-19 PROCEDURE — 3080F DIAST BP >= 90 MM HG: CPT | Performed by: INTERNAL MEDICINE

## 2025-02-19 RX ORDER — CLONAZEPAM 0.5 MG/1
0.5 TABLET ORAL 2 TIMES DAILY PRN
COMMUNITY
Start: 2025-01-14

## 2025-02-19 RX ORDER — CIPROFLOXACIN AND DEXAMETHASONE 3; 1 MG/ML; MG/ML
2 SUSPENSION/ DROPS AURICULAR (OTIC) 2 TIMES DAILY
Qty: 7.5 ML | Refills: 0 | Status: SHIPPED | OUTPATIENT
Start: 2025-02-19

## 2025-02-19 RX ORDER — AMOXICILLIN 875 MG/1
875 TABLET, COATED ORAL 2 TIMES DAILY
Qty: 14 TABLET | Refills: 0 | Status: SHIPPED | OUTPATIENT
Start: 2025-02-19

## 2025-02-19 NOTE — PROGRESS NOTES
[facility]    CHIEF COMPLAINT:    Chief Complaint   Patient presents with    Ear Pain     Possible ear infection to the  right ear pain and jaw pain . Symptoms stared Sunday dizziness and lightheaded 4/23/23 Mammo, 4/6/21 Pap neg HPV, 10/21/23 Colon 10 yrs repeat.          HISTORY OF PRESENT ILLNESS:  Complains of right ear pain for 3 days. Hearing is muffled on the right, there is throbbing pain. She does have some sinus congestion as well but this is chronic. No other uri symptoms. No fevers. She does take baths and feels she may have had some water in her ears. Also finds that since she got hearing aids her ear canal tends to stay inflamed.   Also has been feeling dizzy a bit off and on with head movements. Thinks her vertigo is being triggered by the ear symptoms. No lightheadedness. Does not feel like she will pass out.     REVIEW OF SYSTEMS:  See HPI    Current Medications:    Current Outpatient Medications   Medication Sig Dispense Refill    clonazePAM 0.5 MG Oral Tab Take 1 tablet (0.5 mg total) by mouth 2 (two) times daily as needed for Anxiety.      ciprofloxacin-dexamethasone 0.3-0.1 % Otic Suspension Place 2 drops into the right ear 2 (two) times daily. 7.5 mL 0    amoxicillin 875 MG Oral Tab Take 1 tablet (875 mg total) by mouth 2 (two) times daily. 14 tablet 0    CYCLOBENZAPRINE 10 MG Oral Tab TAKE ONE TABLET BY MOUTH AT NIGHT. 45 tablet 0    amLODIPine 5 MG Oral Tab Take 1 tablet (5 mg total) by mouth 2 (two) times daily. 180 tablet 3    ZAVZPRET 10 MG/ACT Nasal Solution 1 spray by Nasal route daily as needed.      AIMOVIG 70 MG/ML Subcutaneous       RABEprazole Sodium (ACIPHEX) 20 MG Oral Tab EC Take 1 tablet (20 mg total) by mouth daily. 90 tablet 2    gabapentin 300 MG Oral Cap Take by mouth nightly.      methylphenidate ER 27 MG Oral Tab CR Take 1 tablet (27 mg total) by mouth every morning.      linaCLOtide (LINZESS) 145 MCG Oral Cap Take 145 mcg by mouth daily. 90 capsule 3     butalbital-acetaminophen-caffeine -40 MG Oral Tab Take 1 tablet by mouth 3 (three) times daily as needed.      VRAYLAR 1.5 MG Oral Cap Take 1 capsule by mouth every morning.      Fezolinetant (VEOZAH) 45 MG Oral Tab Take by mouth daily.      traZODone 25 mg Oral Tab Take 1 Partial Tablet (25 mg total) by mouth nightly.      escitalopram 20 MG Oral Tab Take 1 tablet (20 mg total) by mouth daily.      levocetirizine 5 MG Oral Tab Take 1 tablet (5 mg total) by mouth every evening. Has been taking in the morning      gabapentin 600 MG Oral Tab 1 tablet (600 mg total) 2 (two) times daily.      ondansetron 4 MG Oral Tablet Dispersible Take 1 tablet (4 mg total) by mouth every 8 (eight) hours as needed for Nausea.      rosuvastatin 5 MG Oral Tab Take 1 tablet (5 mg total) by mouth nightly.         PAST MEDICAL, SOCIAL, AND FAMILY HISTORY:  Tobacco:    History   Smoking Status    Never   Smokeless Tobacco    Never       PHYSICAL EXAM:  /70 (BP Location: Left arm, Patient Position: Sitting, Cuff Size: large)   Pulse 94   Temp 98 °F (36.7 °C) (Temporal)   Resp 20   Ht 5' 5\" (1.651 m)   Wt (!) 326 lb (147.9 kg)   SpO2 99%   BMI 54.25 kg/m²    GENERAL: well developed, well nourished,in no apparent distress  HEENT: left TM normal, right ear canal with erythema and swelling, right TM partially visualized but normal. Has some sinus tenderness to percussion. Oropharynx normal. No tonsillar enlargement or exudates.   NECK: no lad.   NEURO: grossly intact. No unsteady gait. Not dizzy in office today.     DATA:  Results for orders placed or performed during the hospital encounter of 01/07/25   Comp Metabolic Panel (14)    Collection Time: 01/07/25  8:17 PM   Result Value Ref Range    Glucose 114 (H) 70 - 99 mg/dL    Sodium 137 136 - 145 mmol/L    Potassium 3.7 3.5 - 5.1 mmol/L    Chloride 104 98 - 112 mmol/L    CO2 26.0 21.0 - 32.0 mmol/L    Anion Gap 7 0 - 18 mmol/L    BUN 7 (L) 9 - 23 mg/dL    Creatinine 0.83 0.55  - 1.02 mg/dL    Calcium, Total 9.5 8.7 - 10.4 mg/dL    Calculated Osmolality 283 275 - 295 mOsm/kg    eGFR-Cr 83 >=60 mL/min/1.73m2    AST 21 <34 U/L    ALT 17 10 - 49 U/L    Alkaline Phosphatase 110 46 - 118 U/L    Bilirubin, Total 0.3 0.3 - 1.2 mg/dL    Total Protein 8.9 (H) 5.7 - 8.2 g/dL    Albumin 4.7 3.2 - 4.8 g/dL    Globulin  4.2 (H) 2.0 - 3.5 g/dL    A/G Ratio 1.1 1.0 - 2.0   CBC With Differential With Platelet    Collection Time: 01/07/25  8:17 PM   Result Value Ref Range    WBC 9.5 4.0 - 11.0 x10(3) uL    RBC 5.54 (H) 3.80 - 5.30 x10(6)uL    HGB 14.7 12.0 - 16.0 g/dL    HCT 44.1 35.0 - 48.0 %    .0 150.0 - 450.0 10(3)uL    MCV 79.6 (L) 80.0 - 100.0 fL    MCH 26.5 26.0 - 34.0 pg    MCHC 33.3 31.0 - 37.0 g/dL    RDW 13.8 %    Neutrophil Absolute Prelim 6.40 1.50 - 7.70 x10 (3) uL    Neutrophil Absolute 6.40 1.50 - 7.70 x10(3) uL    Lymphocyte Absolute 1.75 1.00 - 4.00 x10(3) uL    Monocyte Absolute 0.74 0.10 - 1.00 x10(3) uL    Eosinophil Absolute 0.47 0.00 - 0.70 x10(3) uL    Basophil Absolute 0.06 0.00 - 0.20 x10(3) uL    Immature Granulocyte Absolute 0.03 0.00 - 1.00 x10(3) uL    Neutrophil % 67.8 %    Lymphocyte % 18.5 %    Monocyte % 7.8 %    Eosinophil % 5.0 %    Basophil % 0.6 %    Immature Granulocyte % 0.3 %   Ethyl Alcohol    Collection Time: 01/07/25  8:17 PM   Result Value Ref Range    Ethyl Alcohol <3 <3 mg/dL   Acetaminophen (Tylenol), S    Collection Time: 01/07/25  8:17 PM   Result Value Ref Range    Acetaminophen <2.0 (L) 10.0 - 20.0 ug/mL   Salicylate, Serum    Collection Time: 01/07/25  8:17 PM   Result Value Ref Range    Salicylate <3.0 (L) 3.0 - 20.0 mg/dL   SARS-CoV-2 by PCR (GENEXPERT)    Collection Time: 01/07/25  8:17 PM    Specimen: Nares; Other   Result Value Ref Range    SARS-CoV-2 (COVID-19) - (GeneXpert) Not Detected Not Detected   EKG    Collection Time: 01/07/25  8:26 PM   Result Value Ref Range    Ventricular rate 99 BPM    Atrial rate 99 BPM    P-R Interval 154 ms     QRS Duration 150 ms    Q-T Interval 394 ms    QTC Calculation (Bezet) 505 ms    P Axis 52 degrees    R Axis 12 degrees    T Axis 119 degrees   Urinalysis, Routine    Collection Time: 01/07/25  9:20 PM   Result Value Ref Range    Urine Color Yellow Yellow    Clarity Urine Turbid (A) Clear    Spec Gravity 1.027 1.005 - 1.030    Glucose Urine Normal Normal mg/dL    Bilirubin Urine Negative Negative    Ketones Urine Negative Negative mg/dL    Blood Urine Negative Negative    pH Urine 5.5 5.0 - 8.0    Protein Urine 30 (A) Negative mg/dL    Urobilinogen Urine Normal Normal mg/dL    Nitrite Urine Negative Negative    Leukocyte Esterase Urine Negative Negative    WBC Urine 6-10 (A) 0 - 5 /HPF    RBC Urine 0-2 0 - 2 /HPF    Bacteria Urine 1+ (A) None Seen /HPF    Squamous Epi. Cells Few (A) None Seen /HPF    Renal Tubular Epithelial Cells None Seen None Seen /HPF    Transitional Cells None Seen None Seen /HPF    Hyaline Casts Present (A) None Seen /LPF    Yeast Urine None Seen None Seen /HPF   Drug screen 8 w/out confirmation, urine    Collection Time: 01/07/25  9:20 PM   Result Value Ref Range    Amphetamine Urine Negative Negative    Benzodiazepines Urine Negative Negative    Cocaine Urine Negative Negative    Cannabinoid Urine Negative Negative    Ecstasy Urine Presumed Positive (A) Negative    Fentanyl Urine Negative Negative    Opiate Urine Negative Negative    Oxycodone Urine Negative Negative    Creatinine Ur Random 257.80 mg/dL     *Note: Due to a large number of results and/or encounters for the requested time period, some results have not been displayed. A complete set of results can be found in Results Review.            ASSESSMENT AND PLAN:  1. Other infective acute otitis externa of right ear  - ciprofloxacin-dexamethasone 0.3-0.1 % Otic Suspension; Place 2 drops into the right ear 2 (two) times daily.  Dispense: 7.5 mL; Refill: 0  - amoxicillin 875 MG Oral Tab; Take 1 tablet (875 mg total) by mouth 2 (two)  times daily.  Dispense: 14 tablet; Refill: 0  Monitor for improvement of symptoms.     2. Elevated bp  Elevated here today. She has white coat syndrome per pt.   She will check at home and send me readings or bring them in to her next visit.         Return in about 12 days (around 3/3/2025) for follow up.      Cortney Warner MD

## 2025-03-06 ENCOUNTER — ANESTHESIA (OUTPATIENT)
Dept: SURGERY | Facility: HOSPITAL | Age: 55
End: 2025-03-06
Payer: COMMERCIAL

## 2025-03-06 ENCOUNTER — HOSPITAL ENCOUNTER (OUTPATIENT)
Facility: HOSPITAL | Age: 55
Setting detail: HOSPITAL OUTPATIENT SURGERY
Discharge: HOME | End: 2025-03-06
Attending: ORTHOPAEDIC SURGERY | Admitting: ORTHOPAEDIC SURGERY
Payer: COMMERCIAL

## 2025-03-06 ENCOUNTER — ANESTHESIA EVENT (OUTPATIENT)
Dept: SURGERY | Facility: HOSPITAL | Age: 55
End: 2025-03-06
Payer: COMMERCIAL

## 2025-03-06 VITALS
SYSTOLIC BLOOD PRESSURE: 155 MMHG | RESPIRATION RATE: 20 BRPM | DIASTOLIC BLOOD PRESSURE: 82 MMHG | BODY MASS INDEX: 48.82 KG/M2 | OXYGEN SATURATION: 97 % | WEIGHT: 293 LBS | TEMPERATURE: 98 F | HEIGHT: 65 IN | HEART RATE: 84 BPM

## 2025-03-06 DIAGNOSIS — G56.01 CARPAL TUNNEL SYNDROME ON RIGHT: Primary | ICD-10-CM

## 2025-03-06 PROCEDURE — 01N50ZZ RELEASE MEDIAN NERVE, OPEN APPROACH: ICD-10-PCS | Performed by: ORTHOPAEDIC SURGERY

## 2025-03-06 RX ORDER — LIDOCAINE HYDROCHLORIDE AND EPINEPHRINE 10; 10 MG/ML; UG/ML
INJECTION, SOLUTION INFILTRATION; PERINEURAL AS NEEDED
Status: DISCONTINUED | OUTPATIENT
Start: 2025-03-06 | End: 2025-03-06 | Stop reason: HOSPADM

## 2025-03-06 RX ORDER — HYDROMORPHONE HYDROCHLORIDE 1 MG/ML
0.2 INJECTION, SOLUTION INTRAMUSCULAR; INTRAVENOUS; SUBCUTANEOUS EVERY 5 MIN PRN
Status: DISCONTINUED | OUTPATIENT
Start: 2025-03-06 | End: 2025-03-06

## 2025-03-06 RX ORDER — HYDROCODONE BITARTRATE AND ACETAMINOPHEN 5; 325 MG/1; MG/1
1 TABLET ORAL ONCE AS NEEDED
Status: DISCONTINUED | OUTPATIENT
Start: 2025-03-06 | End: 2025-03-06

## 2025-03-06 RX ORDER — ONDANSETRON 2 MG/ML
4 INJECTION INTRAMUSCULAR; INTRAVENOUS EVERY 6 HOURS PRN
Status: DISCONTINUED | OUTPATIENT
Start: 2025-03-06 | End: 2025-03-14

## 2025-03-06 RX ORDER — KETOROLAC TROMETHAMINE 30 MG/ML
INJECTION, SOLUTION INTRAMUSCULAR; INTRAVENOUS AS NEEDED
Status: DISCONTINUED | OUTPATIENT
Start: 2025-03-06 | End: 2025-03-06 | Stop reason: SURG

## 2025-03-06 RX ORDER — HYDROMORPHONE HYDROCHLORIDE 1 MG/ML
0.6 INJECTION, SOLUTION INTRAMUSCULAR; INTRAVENOUS; SUBCUTANEOUS EVERY 5 MIN PRN
Status: DISCONTINUED | OUTPATIENT
Start: 2025-03-06 | End: 2025-03-06

## 2025-03-06 RX ORDER — MIDAZOLAM HYDROCHLORIDE 1 MG/ML
INJECTION INTRAMUSCULAR; INTRAVENOUS AS NEEDED
Status: DISCONTINUED | OUTPATIENT
Start: 2025-03-06 | End: 2025-03-06 | Stop reason: SURG

## 2025-03-06 RX ORDER — SODIUM CHLORIDE, SODIUM LACTATE, POTASSIUM CHLORIDE, CALCIUM CHLORIDE 600; 310; 30; 20 MG/100ML; MG/100ML; MG/100ML; MG/100ML
INJECTION, SOLUTION INTRAVENOUS CONTINUOUS
Status: DISCONTINUED | OUTPATIENT
Start: 2025-03-06 | End: 2025-03-14

## 2025-03-06 RX ORDER — ACETAMINOPHEN 500 MG
1000 TABLET ORAL ONCE
Status: DISCONTINUED | OUTPATIENT
Start: 2025-03-06 | End: 2025-03-06 | Stop reason: HOSPADM

## 2025-03-06 RX ORDER — HYDROCODONE BITARTRATE AND ACETAMINOPHEN 5; 325 MG/1; MG/1
2 TABLET ORAL ONCE AS NEEDED
Status: DISCONTINUED | OUTPATIENT
Start: 2025-03-06 | End: 2025-03-06

## 2025-03-06 RX ORDER — ONDANSETRON 2 MG/ML
INJECTION INTRAMUSCULAR; INTRAVENOUS AS NEEDED
Status: DISCONTINUED | OUTPATIENT
Start: 2025-03-06 | End: 2025-03-06 | Stop reason: SURG

## 2025-03-06 RX ORDER — ACETAMINOPHEN AND CODEINE PHOSPHATE 300; 30 MG/1; MG/1
1-2 TABLET ORAL EVERY 6 HOURS PRN
Qty: 10 TABLET | Refills: 1 | Status: SHIPPED | OUTPATIENT
Start: 2025-03-06

## 2025-03-06 RX ORDER — ACETAMINOPHEN 500 MG
1000 TABLET ORAL ONCE AS NEEDED
Status: DISCONTINUED | OUTPATIENT
Start: 2025-03-06 | End: 2025-03-06

## 2025-03-06 RX ORDER — PROCHLORPERAZINE EDISYLATE 5 MG/ML
5 INJECTION INTRAMUSCULAR; INTRAVENOUS EVERY 8 HOURS PRN
Status: DISCONTINUED | OUTPATIENT
Start: 2025-03-06 | End: 2025-03-14

## 2025-03-06 RX ORDER — CEFAZOLIN SODIUM IN 0.9 % NACL 3 G/100 ML
3 INTRAVENOUS SOLUTION, PIGGYBACK (ML) INTRAVENOUS ONCE
Status: COMPLETED | OUTPATIENT
Start: 2025-03-06 | End: 2025-03-06

## 2025-03-06 RX ORDER — IBUPROFEN 600 MG/1
600 TABLET, FILM COATED ORAL ONCE AS NEEDED
Status: DISCONTINUED | OUTPATIENT
Start: 2025-03-06 | End: 2025-03-06

## 2025-03-06 RX ORDER — NALOXONE HYDROCHLORIDE 0.4 MG/ML
0.08 INJECTION, SOLUTION INTRAMUSCULAR; INTRAVENOUS; SUBCUTANEOUS AS NEEDED
Status: DISCONTINUED | OUTPATIENT
Start: 2025-03-06 | End: 2025-03-06

## 2025-03-06 RX ORDER — HYDROMORPHONE HYDROCHLORIDE 1 MG/ML
0.4 INJECTION, SOLUTION INTRAMUSCULAR; INTRAVENOUS; SUBCUTANEOUS EVERY 5 MIN PRN
Status: DISCONTINUED | OUTPATIENT
Start: 2025-03-06 | End: 2025-03-06

## 2025-03-06 RX ORDER — DEXAMETHASONE SODIUM PHOSPHATE 4 MG/ML
VIAL (ML) INJECTION AS NEEDED
Status: DISCONTINUED | OUTPATIENT
Start: 2025-03-06 | End: 2025-03-06 | Stop reason: SURG

## 2025-03-06 RX ORDER — BUPIVACAINE HYDROCHLORIDE 5 MG/ML
INJECTION, SOLUTION EPIDURAL; INTRACAUDAL AS NEEDED
Status: DISCONTINUED | OUTPATIENT
Start: 2025-03-06 | End: 2025-03-06 | Stop reason: HOSPADM

## 2025-03-06 RX ORDER — SCOPOLAMINE 1 MG/3D
1 PATCH, EXTENDED RELEASE TRANSDERMAL ONCE
Status: DISCONTINUED | OUTPATIENT
Start: 2025-03-06 | End: 2025-03-06 | Stop reason: HOSPADM

## 2025-03-06 RX ADMIN — MIDAZOLAM HYDROCHLORIDE 2 MG: 1 INJECTION INTRAMUSCULAR; INTRAVENOUS at 10:37:00

## 2025-03-06 RX ADMIN — SODIUM CHLORIDE, SODIUM LACTATE, POTASSIUM CHLORIDE, CALCIUM CHLORIDE: 600; 310; 30; 20 INJECTION, SOLUTION INTRAVENOUS at 11:05:00

## 2025-03-06 RX ADMIN — ONDANSETRON 4 MG: 2 INJECTION INTRAMUSCULAR; INTRAVENOUS at 10:46:00

## 2025-03-06 RX ADMIN — CEFAZOLIN SODIUM IN 0.9 % NACL 3 G: 3 G/100 ML INTRAVENOUS SOLUTION, PIGGYBACK (ML) INTRAVENOUS at 10:45:00

## 2025-03-06 RX ADMIN — SODIUM CHLORIDE, SODIUM LACTATE, POTASSIUM CHLORIDE, CALCIUM CHLORIDE: 600; 310; 30; 20 INJECTION, SOLUTION INTRAVENOUS at 10:48:00

## 2025-03-06 RX ADMIN — DEXAMETHASONE SODIUM PHOSPHATE 4 MG: 4 MG/ML VIAL (ML) INJECTION at 10:46:00

## 2025-03-06 RX ADMIN — KETOROLAC TROMETHAMINE 15 MG: 30 INJECTION, SOLUTION INTRAMUSCULAR; INTRAVENOUS at 10:48:00

## 2025-03-06 NOTE — OPERATIVE REPORT
[facility]    PATIENT'S NAME: ZULEYKA MAURER   ATTENDING PHYSICIAN: Willie Tatum M.D.   OPERATING PHYSICIAN: Willie Tatum M.D.   PATIENT ACCOUNT#:   [id number]    [facility]  PACU 6 [facility] 10  MEDICAL RECORD #:   [id number]       YOB: 1970  ADMISSION DATE:       03/06/2025      OPERATION DATE:  03/06/2025    OPERATIVE REPORT      PREOPERATIVE DIAGNOSIS:  Right carpal tunnel syndrome.  POSTOPERATIVE DIAGNOSIS:  Right carpal tunnel syndrome.  PROCEDURE:  Right carpal tunnel release.      ANESTHESIA:  MAC.    ESTIMATED BLOOD LOSS:  Minimal.    TOURNIQUET TIME:  Zero.    SPECIMENS:  None.    COMPLICATIONS:  None.    DISPOSITION:  Fair condition to the recovery room.    PLAN:  Patient can begin immediate range of motion exercise.    INDICATIONS:  The patient is a 54-year-old female with history of right carpal tunnel syndrome.  She did have difficulty despite bracing and anti-inflammatories.  She was therefore offered surgical intervention.  The risks  and benefits of the procedure were discussed in detail with the patient including risk of incomplete and delayed recovery of function, chronic pain, pillar pain, skin healing problems, infection.  She shows good understanding of these issues and wished to proceed with surgery.    FINDINGS:  Consistent with diagnosis.    OPERATIVE TECHNIQUE:  On the date of operation, I saw the patient in the holding room, initialed the surgical site.  The patient was taken to the operating room and was placed in supine position on the OR table.  MAC anesthetic was performed by Anesthesia.  The area surrounding the proposed incision was infiltrated with lidocaine with epinephrine.  The right upper extremity was prepped and draped in standard surgical fashion.  A surgical time-out was taken in which the proper patient, surgical site, and procedure were verified.  A longitudinal incision in the proximal aspect of the palm paralleling the thenar crease was  performed.  Dissection was carried down through the soft tissue, and full-thickness skin flaps were raised.  The palmar aponeurosis was divided longitudinally, and the underlying transcarpal ligament was identified.  The distal fibers were divided with a 15 blade.  The guide for the Integra carpal tunnel was passed deep to transcarpal ligament.  The limb was divided in a distal to proximal fashion.  The contents of carpal tunnel were inspected and found to be intact.  The wound was copiously irrigated, and the skin flaps were closed with horizontal mattress 4-0 nylon suture.  Sterile bulky dressings were applied.  The patient was awakened from anesthesia and was taken to the recovery room in fair condition.  She will be sent home with postop written and oral instructions as well as oral narcotics for postop pain.  She will follow up in 1 week for wound check.    Dictated By Willie Tatum M.D.  d: 03/06/2025 11:14:56  t: 03/06/2025 16:05:51  Job [id number]  /

## 2025-03-06 NOTE — PROGRESS NOTES
Patient able to tolerate po intake without complaints. Pt denies pain, n/v and lightheadedness. Discharge instructions reviewed with patient and Son Booker. All questions answered to the best of my ability. VSS. IV removed. Pt able to dress self without complaints. Dr. Horton number given to patient and son.

## 2025-03-06 NOTE — ANESTHESIA PREPROCEDURE EVALUATION
PRE-OP EVALUATION    Patient Name: Jennifer Wills    Admit Diagnosis: G56.01 CARPAL TUNNEL SYNDROME OF RIGHT WRIST    Pre-op Diagnosis: G56.01 CARPAL TUNNEL SYNDROME OF RIGHT WRIST    RIGHT CARPAL TUNNEL RELEASE    Anesthesia Procedure: RIGHT CARPAL TUNNEL RELEASE (Right)    Surgeons and Role:     * Willie Tatum MD - Primary    Pre-op vitals reviewed.        Body mass index is 53.25 kg/m².    Current medications reviewed.  Hospital Medications:   acetaminophen (Tylenol Extra Strength) tab 1,000 mg  1,000 mg Oral Once    scopolamine (Transderm-Scop) 1 MG/3DAYS patch 1 patch  1 patch Transdermal Once    lactated ringers infusion   Intravenous Continuous    ceFAZolin (Ancef) 3 g in sodium chloride 0.9% 100mL IVPB premix  3 g Intravenous Once       Outpatient Medications:   Prescriptions Prior to Admission[1]    Allergies: Sudafed [pseudoephedrine], Dust, Losartan, and Pollen      Anesthesia Evaluation    Patient summary reviewed.    Anesthetic Complications           GI/Hepatic/Renal      (+) GERD                           Cardiovascular                (+) obesity  (+) hypertension                                     Endo/Other    Negative endo/other ROS.                              Pulmonary                    (+) sleep apnea       Neuro/Psych      (+) depression           (+) neuromuscular disease                     Past Surgical History:   Procedure Laterality Date    Cholecystectomy  1/2001    gallbladder removed    Colonoscopy  8/14/2013    Procedure: COLONOSCOPY;  Surgeon: Frankel, Jennifer, MD;  Location:  ENDOSCOPY    Colonoscopy  10/3/2014    Procedure: COLONOSCOPY;  Surgeon: Devaughn Hall MD;  Location:  ENDOSCOPY    Colonoscopy  10/2014    repeat in 10 years    Colonoscopy N/A 10/8/2014    Procedure: COLONOSCOPY;  Surgeon: Azar Galeano MD;  Location:  ENDOSCOPY    Colonoscopy N/A 10/2/2023    Procedure: COLONOSCOPY;  Surgeon: Arsen Campbell DO;  Location:  ENDOSCOPY    D & c       x3    Dilation/curettage,diagnostic      Endometrial ablation      Lumpectomy left  1/2006, 1/2009    Other surgical history      benign lump removal    Other surgical history  6/13    colonoscopy    Other surgical history      upper endoscopy    Other surgical history N/A 6/4/2015    Procedure: ESOPHAGOGASTRODUODENOSCOPY (EGD);  Surgeon: Deangelo Gómez MD;  Location:  ENDOSCOPY     Social History     Socioeconomic History    Marital status:    Occupational History    Occupation: [employer]   Tobacco Use    Smoking status: Never     Passive exposure: Past (parents smoked in home when younger)    Smokeless tobacco: Never   Vaping Use    Vaping status: Never Used   Substance and Sexual Activity    Alcohol use: Yes     Comment: appox0-1 a month    Drug use: Never   Other Topics Concern    Caffeine Concern Yes     Comment: coffee, soda-few times per week    Exercise No    Seat Belt Yes     History   Drug Use Unknown     Available pre-op labs reviewed.  Lab Results   Component Value Date    WBC 9.5 01/07/2025    RBC 5.54 (H) 01/07/2025    HGB 14.7 01/07/2025    HCT 44.1 01/07/2025    MCV 79.6 (L) 01/07/2025    MCH 26.5 01/07/2025    MCHC 33.3 01/07/2025    RDW 13.8 01/07/2025    .0 01/07/2025     Lab Results   Component Value Date     02/19/2025    K 4.0 02/19/2025     02/19/2025    CO2 30.0 02/19/2025    BUN 8 (L) 02/19/2025    CREATSERUM 0.77 02/19/2025     (H) 02/19/2025    CA 9.8 02/19/2025            Airway      Mallampati: III       Cardiovascular    Cardiovascular exam normal.         Dental    Dentition appears grossly intact         Pulmonary    Pulmonary exam normal.                 Other findings              ASA: 4   Plan: MAC and general  NPO status verified and           Plan/risks discussed with: patient                Present on Admission:  **None**             [1]   Medications Prior to Admission   Medication Sig Dispense Refill Last  Dose/Taking    clonazePAM 0.5 MG Oral Tab Take 1 tablet (0.5 mg total) by mouth 2 (two) times daily as needed for Anxiety.   Taking As Needed    ciprofloxacin-dexamethasone 0.3-0.1 % Otic Suspension Place 2 drops into the right ear 2 (two) times daily. 7.5 mL 0 Taking    amoxicillin 875 MG Oral Tab Take 1 tablet (875 mg total) by mouth 2 (two) times daily. 14 tablet 0 Taking    CYCLOBENZAPRINE 10 MG Oral Tab TAKE ONE TABLET BY MOUTH AT NIGHT. 45 tablet 0 Taking    amLODIPine 5 MG Oral Tab Take 1 tablet (5 mg total) by mouth 2 (two) times daily. 180 tablet 3 Taking    RABEprazole Sodium (ACIPHEX) 20 MG Oral Tab EC Take 1 tablet (20 mg total) by mouth daily. 90 tablet 2 Taking    gabapentin 300 MG Oral Cap Take by mouth nightly.   Taking    methylphenidate ER 27 MG Oral Tab CR Take 1 tablet (27 mg total) by mouth every morning.   Taking    linaCLOtide (LINZESS) 145 MCG Oral Cap Take 145 mcg by mouth daily. (Patient taking differently: Take 145 mcg by mouth daily as needed.) 90 capsule 3 Taking Differently    VRAYLAR 1.5 MG Oral Cap Take 1 capsule by mouth every morning.   Taking    Fezolinetant (VEOZAH) 45 MG Oral Tab Take by mouth daily.   Taking    traZODone 25 mg Oral Tab Take 37.5 mg by mouth nightly.   Taking    escitalopram 20 MG Oral Tab Take 1 tablet (20 mg total) by mouth daily.   Taking    levocetirizine 5 MG Oral Tab Take 1 tablet (5 mg total) by mouth every evening. Has been taking in the morning   Taking    gabapentin 600 MG Oral Tab 1 tablet (600 mg total) 2 (two) times daily.   Taking    ondansetron 4 MG Oral Tablet Dispersible Take 1 tablet (4 mg total) by mouth every 8 (eight) hours as needed for Nausea.   Taking As Needed    rosuvastatin 5 MG Oral Tab Take 1 tablet (5 mg total) by mouth nightly.   Taking

## 2025-03-06 NOTE — ANESTHESIA POSTPROCEDURE EVALUATION
FOLLOW UP ALLERGIES:  No Known Allergies  MEDICATIONS: reviewed and are up to date  TOBACCO:  reviewed and are up to date  Primary medical doctor: ARETHA Dodson  Patient is here for right shoulder  PREVIOUS TREATMENT: rest        [facility]    Jennifer Wills Patient Status:  Hospital Outpatient Surgery   Age/Gender 54 year old female MRN [id number]   Location [facility] POST ANESTHESIA CARE UNIT Attending Willie Tatum MD   Hosp Day # 0 PCP Cortney Warner MD       Anesthesia Post-op Note    RIGHT CARPAL TUNNEL RELEASE    Procedure Summary       Date: 03/06/25 Room / Location:  MAIN OR 15 /  MAIN OR    Anesthesia Start: 1035 Anesthesia Stop:     Procedure: RIGHT CARPAL TUNNEL RELEASE (Right: Wrist) Diagnosis: (G56.01 CARPAL TUNNEL SYNDROME OF RIGHT WRIST)    Surgeons: Willie Tatum MD Anesthesiologist:     Anesthesia Type: general ASA Status: 4            Anesthesia Type: general    Vitals Value Taken Time   /82 03/06/25 1105   Temp 97 03/06/25 1105   Pulse 102 03/06/25 1105   Resp 18 03/06/25 1105   SpO2 93 03/06/25 1105           Patient Location: PACU    Anesthesia Type: general    Airway Patency: patent    Postop Pain Control: adequate    Mental Status: mildly sedated but able to meaningfully participate in the post-anesthesia evaluation    Nausea/Vomiting: none    Cardiopulmonary/Hydration status: stable euvolemic    Complications: no apparent anesthesia related complications    Postop vital signs: stable    Dental Exam: Unchanged from Preop    Patient to be discharged from PACU when criteria met.

## 2025-03-06 NOTE — ANESTHESIA PROCEDURE NOTES
Airway  Date/Time: 3/6/2025 10:43 AM  Urgency: elective      General Information and Staff    Patient location during procedure: OR  Anesthesiologist: Thomas Cooley MD  Performed: anesthesiologist   Performed by: Thomas Cooley MD  Authorized by: Thomas Cooley MD      Indications and Patient Condition  Indications for airway management: anesthesia  Sedation level: deep  Preoxygenated: yes  Patient position: sniffing  Mask difficulty assessment: 1 - vent by mask    Final Airway Details  Final airway type: supraglottic airway      Successful airway: classic  Size 3       Number of attempts at approach: 1

## 2025-03-06 NOTE — DISCHARGE INSTRUCTIONS
Move fingers often  Elevate and ice the extremity  Remove dressings in 2 days  Return to clinic next week

## 2025-03-06 NOTE — BRIEF OP NOTE
Pre-Operative Diagnosis: G56.01 CARPAL TUNNEL SYNDROME OF RIGHT WRIST     Post-Operative Diagnosis: G56.01 CARPAL TUNNEL SYNDROME OF RIGHT WRIST      Procedure Performed:   RIGHT CARPAL TUNNEL RELEASE    Surgeons and Role:     * Willie Tatum MD - Primary    Assistant(s):        Surgical Findings: c/w dx     Specimen: none     Estimated Blood Loss: Blood Output: 2 mL (3/6/2025 10:57 AM)      Dictation Number:       Willie Tatum MD  3/6/2025  11:12 AM

## 2025-03-06 NOTE — H&P
[facility]  History & Physical    Jennifer Wills Patient Status:  Hospital Outpatient Surgery    1970 MRN [id number]   Location [facility] PERIOPERATIVE SERVICE Attending Willie Tatum MD   Hosp Day # 0 PCP Cortney Warner MD     History of Present Illness:  Jennifer Wills is a(n) 54 year old female. R hand numbness, to OR for CTR    History:  Past Medical History:    Abdominal distention    Abdominal pain    Anemia    Have had anemia several times    Anxiety    Arthritis    Autoimmune disease (HCC)    Back pain    Back problem    low back pain    Bad breath    Belching    Bloating    Blurred vision    Chest pain    Chest pain on exertion    Constipation    DEPRESSION    Depression    Diarrhea, unspecified    Dizziness    DVT (deep venous thrombosis) (HCC)    bilateral dvt     Easy bruising    Endocrine disorder    Esophageal reflux    Essential hypertension    Essential hypertension, benign    Exposure to TB    Fatigue    Feeling lonely    Fibromyalgia    joint pain aches pain stiffness    Flatulence/gas pain/belching    Food intolerance    Off and on    Frequent urination    Gastritis    GERD    Headache disorder    migraines, injections    HEADACHES    migraine    Hearing impaired person, bilateral    bilateral hearing aids    Hearing loss    Heartburn    Heavy menses    No periods since ablation in     Hemorrhoids    High blood pressure    High cholesterol    History of blood clots    legs bilat    History of depression    History of mental disorder    History of miscarriage    Hoarseness, chronic    Hx of motion sickness    HYPOTHYROIDISM    IBS (irritable bowel syndrome)    Indigestion    Insomnia    Irregular bowel habits    Itch of skin    Leaking of urine    Leg swelling    Loss of appetite    Menses painful    Had ablation     Migraines    Nausea    Night sweats    OA (osteoarthritis)    right knee    Obesity    TAYLOR (obstructive sleep apnea)    cpap    Osteoarthrosis,  unspecified whether generalized or localized, unspecified site    Pain in joints    Pain with bowel movements    Phlebitis and thrombophlebitis of other deep vessels of lower extremities    Popliteal synovial cyst    Post partum depression    Reflux esophagitis    RLS (restless legs syndrome)    Skin blushing/flushing    Sleep apnea    Sleep disturbance    Stool incontinence    Sonetimes urge comes on very quickly    Stress    Thyroid disease    Had diagnosis of hypothyroid, and then Hyperthyroid and now    Uncomfortable fullness after meals    Unspecified sleep apnea    Visual impairment    glasses    Wears glasses    Weight gain    Weight loss     Past Surgical History:   Procedure Laterality Date    Cholecystectomy  1/2001    gallbladder removed    Colonoscopy  8/14/2013    Procedure: COLONOSCOPY;  Surgeon: Frankel, Jennifer, MD;  Location:  ENDOSCOPY    Colonoscopy  10/3/2014    Procedure: COLONOSCOPY;  Surgeon: Devaughn Hall MD;  Location:  ENDOSCOPY    Colonoscopy  10/2014    repeat in 10 years    Colonoscopy N/A 10/8/2014    Procedure: COLONOSCOPY;  Surgeon: Azar Galeano MD;  Location:  ENDOSCOPY    Colonoscopy N/A 10/2/2023    Procedure: COLONOSCOPY;  Surgeon: Arsen Campbell DO;  Location:  ENDOSCOPY    D & c      x3    Dilation/curettage,diagnostic      Endometrial ablation      Lumpectomy left  1/2006, 1/2009    Other surgical history      benign lump removal    Other surgical history  6/13    colonoscopy    Other surgical history      upper endoscopy    Other surgical history N/A 6/4/2015    Procedure: ESOPHAGOGASTRODUODENOSCOPY (EGD);  Surgeon: Deangelo Gómez MD;  Location:  ENDOSCOPY     Family History   Problem Relation Age of Onset    Heart Disorder Father         stents    Alcohol and Other Disorders Associated Father         Saurabh duffy    Other (Other) Father     Other (CAD) Father         s    Heart Attack Father     Psychiatric Mother     Arthritis Mother          RA    Bipolar Disorder Mother         valium; lithium; multiple    Suicide History Mother         atttempts    Hypertension Mother     Stroke Mother         Two strokes    Mental Disorder Mother     Anxiety Sister     Depression Sister     Other (thyroid) Sister     Anxiety Daughter     Depression Daughter         zoloft    Other (impulse control do/adhd?) Son     Heart Disorder Other         paternal    Bipolar Disorder Other     Depression Other         maternal    ADHD Other     Anxiety Other       reports that she has never smoked. She has been exposed to tobacco smoke. She has never used smokeless tobacco. She reports current alcohol use. She reports that she does not use drugs.    Allergies:  Allergies[1]    Home Medications:  Prescriptions Prior to Admission[2]    Review of Systems:  A comprehensive review of systems was negative.    Physical Exam:  General: Alert, orientated x3.  Cooperative.  No apparent distress.  Vital Signs:  Pulse 86   Temp 98.2 °F (36.8 °C) (Oral)   Resp 16   Ht 5' 5\" (1.651 m)   Wt (!) 324 lb (147 kg)   SpO2 95%   BMI 53.92 kg/m²   HEENT: Exam is unremarkable.  Without scleral icterus.  Mucous membranes are moist. Pupils are equal and round, reactive to light and accommodate.  Pupils are approximately 3mm and react to 2mm with reaction to light.  Oropharynx is clear.  Neck: No tenderness to palpitation.  Full range of motion to flexion and extension, lateral rotation and lateral flexion of cervical spine.  No JVD. Supple.   Lungs: Clear to auscultation bilaterally.  Cardiac: Regular rate and rhythm. No murmur.  Abdomen:  Soft, non-distended, non-tender, with no rebound or guarding.  No peritoneal signs. No ascites.  Liver is within normal limits.  Spleen is not palpable.    Extremities:  No lower extremity edema noted.  Without clubbing or cyanosis.  R hand pos Phalen  Skin: Normal texture and turgor.  Lymphatic:  No palpable cervical lymphadenopathy.  Neurologic: Cranial  nerves are grossly intact.  Motor strength and sensory examination is grossly normal.  No focal neurologic deficit.    Laboratory Data:  EMG with CTS    Impression and Plan:  Patient Active Problem List   Diagnosis    RLS (restless legs syndrome)    Gas bloat syndrome    IBS (irritable bowel syndrome)    Lymphocytic colitis    TAYLOR on CPAP    Essential hypertension    Morbid obesity, unspecified obesity type (HCC)    Anxiety state    Insomnia    Attention and concentration deficit    Chronic migraine    Prediabetes    Panic disorder (episodic paroxysmal anxiety)    Recurrent major depressive disorder, in remission    Acquired hypothyroidism    Family history of coronary arteriosclerosis    Left bundle branch block (LBBB)    Bilateral leg pain    Mixed hyperlipidemia    Fibromyalgia    Gastroesophageal reflux disease       R CTS    Pt tried non-op measures without improvement  Surgery offered  To OR for CTR  Risk/benefit d/w patient inc skin healing, stiffness, incomplete and delayed recovery of nerve function  She shows good understanding and wishes to proceed    Time spent on counseling/coordination of care:  30 Minutes  Total time spent with patient:  30 Minutes    Willie Tatum MD  3/6/2025  10:04 AM         [1]   Allergies  Allergen Reactions    Sudafed [Pseudoephedrine] ANGIOEDEMA    Dust Runny nose and ITCHING    Losartan Coughing    Pollen Runny nose and ITCHING   [2]   Medications Prior to Admission   Medication Sig Dispense Refill Last Dose/Taking    clonazePAM 0.5 MG Oral Tab Take 1 tablet (0.5 mg total) by mouth 2 (two) times daily as needed for Anxiety.   Past Month    ciprofloxacin-dexamethasone 0.3-0.1 % Otic Suspension Place 2 drops into the right ear 2 (two) times daily. 7.5 mL 0 Taking    amoxicillin 875 MG Oral Tab Take 1 tablet (875 mg total) by mouth 2 (two) times daily. 14 tablet 0 Past Month    CYCLOBENZAPRINE 10 MG Oral Tab TAKE ONE TABLET BY MOUTH AT NIGHT. 45 tablet 0 Past Week    amLODIPine  5 MG Oral Tab Take 1 tablet (5 mg total) by mouth 2 (two) times daily. 180 tablet 3 3/6/2025 Morning    RABEprazole Sodium (ACIPHEX) 20 MG Oral Tab EC Take 1 tablet (20 mg total) by mouth daily. 90 tablet 2 3/6/2025 at  6:00 AM    gabapentin 300 MG Oral Cap Take by mouth nightly.   3/6/2025    methylphenidate ER 27 MG Oral Tab CR Take 1 tablet (27 mg total) by mouth every morning.   3/5/2025    linaCLOtide (LINZESS) 145 MCG Oral Cap Take 145 mcg by mouth daily. (Patient taking differently: Take 145 mcg by mouth daily as needed.) 90 capsule 3 More than a month    VRAYLAR 1.5 MG Oral Cap Take 1 capsule by mouth every morning.   3/6/2025 at  6:00 AM    Fezolinetant (VEOZAH) 45 MG Oral Tab Take by mouth daily.   3/6/2025 at  6:00 AM    traZODone 25 mg Oral Tab Take 37.5 mg by mouth nightly.   3/5/2025 Bedtime    escitalopram 20 MG Oral Tab Take 1 tablet (20 mg total) by mouth daily.   3/6/2025 at  6:00 AM    levocetirizine 5 MG Oral Tab Take 1 tablet (5 mg total) by mouth every evening. Has been taking in the morning   3/6/2025 at  6:00 AM    gabapentin 600 MG Oral Tab 1 tablet (600 mg total) 2 (two) times daily.   3/6/2025 at  6:20 AM    ondansetron 4 MG Oral Tablet Dispersible Take 1 tablet (4 mg total) by mouth every 8 (eight) hours as needed for Nausea.   More than a month    rosuvastatin 5 MG Oral Tab Take 1 tablet (5 mg total) by mouth nightly.   3/5/2025 Bedtime

## 2025-03-16 DIAGNOSIS — M79.7 FIBROMYALGIA: ICD-10-CM

## 2025-03-18 NOTE — TELEPHONE ENCOUNTER
Last OV relevant to medication: 2/19/25  Last refill date: 2/3/25 #45/refills: 0  When pt was asked to return for OV: 3/3/25  Upcoming appt/reason:   Future Appointments   Date Time Provider Department Center   8/7/2025  1:00 PM Jessy Brown APRN [facility]   10/6/2025  4:20 PM Cortney Warner MD [facility] [address]   Was pt informed of any over due labs: utd      Did you want to see pt for short term follow up still?

## 2025-03-18 NOTE — TELEPHONE ENCOUNTER
She takes cyclobenzaprine for fibromyalgia, takes it prn at night when the pain is bad. Just refilled a month ago. Please find out why she needs this again. Usually the 45 tablets last about 3 months for her.

## 2025-03-19 RX ORDER — CYCLOBENZAPRINE HCL 10 MG
10 TABLET ORAL NIGHTLY
Qty: 90 TABLET | Refills: 0 | Status: SHIPPED | OUTPATIENT
Start: 2025-03-19

## 2025-05-14 ENCOUNTER — TELEPHONE (OUTPATIENT)
Dept: INTERNAL MEDICINE CLINIC | Facility: CLINIC | Age: 55
End: 2025-05-14

## 2025-05-14 NOTE — TELEPHONE ENCOUNTER
Form completed. In 's bin for signature, thanks!    Future Appointments   Date Time Provider Department Center   8/7/2025  1:00 PM Jessy Brown APRN [facility] [address]   10/6/2025  4:20 PM Cortney Warner MD [facility] [address]

## 2025-05-14 NOTE — TELEPHONE ENCOUNTER
Incoming (mail or fax):  fax  Received from:  [facility] [employer]  Documentation given to:  Triage incoming    Prescription verification

## 2025-05-21 ENCOUNTER — MED REC SCAN ONLY (OUTPATIENT)
Dept: INTERNAL MEDICINE CLINIC | Facility: CLINIC | Age: 55
End: 2025-05-21

## 2025-06-22 DIAGNOSIS — M79.7 FIBROMYALGIA: ICD-10-CM

## 2025-06-25 RX ORDER — CYCLOBENZAPRINE HCL 10 MG
10 TABLET ORAL NIGHTLY
Qty: 90 TABLET | Refills: 1 | Status: SHIPPED | OUTPATIENT
Start: 2025-06-25

## 2025-06-25 NOTE — TELEPHONE ENCOUNTER
Last OV relevant to medication: 9/30/24  Last refill date: 3/19/25 #90/refills: 0  When pt was asked to return for OV: 9/30/25  Upcoming appt/reason:   Future Appointments   Date Time Provider Department Center   8/7/2025  1:00 PM Jessy Brown APRN [facility]   10/6/2025  4:20 PM Cortney Warner MD [facility] [address] [place]   Was pt informed of any over due labs: utd  Lab Results   Component Value Date     (H) 02/19/2025    BUN 8 (L) 02/19/2025    BUNCREA 10.0 07/03/2021    CREATSERUM 0.77 02/19/2025    ANIONGAP 8 02/19/2025    GFR 69 09/11/2016    GFRNAA 68 07/26/2022    GFRAA 78 07/26/2022    CA 9.8 02/19/2025    OSMOCALC 291 02/19/2025    ALKPHO 113 02/19/2025    AST 22 02/19/2025    ALT 21 02/19/2025    BILT 0.2 (L) 02/19/2025    TP 8.1 02/19/2025    ALB 4.6 02/19/2025    GLOBULIN 3.5 02/19/2025     02/19/2025    K 4.0 02/19/2025     02/19/2025    CO2 30.0 02/19/2025

## 2025-08-07 ENCOUNTER — OFFICE VISIT (OUTPATIENT)
Dept: INTERNAL MEDICINE CLINIC | Facility: CLINIC | Age: 55
End: 2025-08-07

## 2025-08-07 ENCOUNTER — LAB ENCOUNTER (OUTPATIENT)
Dept: LAB | Age: 55
End: 2025-08-07
Attending: NURSE PRACTITIONER

## 2025-08-07 DIAGNOSIS — I10 ESSENTIAL HYPERTENSION: ICD-10-CM

## 2025-08-07 DIAGNOSIS — G47.33 OSA ON CPAP: ICD-10-CM

## 2025-08-07 DIAGNOSIS — F41.9 ANXIETY AND DEPRESSION: ICD-10-CM

## 2025-08-07 DIAGNOSIS — Z51.81 ENCOUNTER FOR THERAPEUTIC DRUG MONITORING: ICD-10-CM

## 2025-08-07 DIAGNOSIS — E66.813 CLASS 3 SEVERE OBESITY WITH SERIOUS COMORBIDITY AND BODY MASS INDEX (BMI) OF 50.0 TO 59.9 IN ADULT, UNSPECIFIED OBESITY TYPE: ICD-10-CM

## 2025-08-07 DIAGNOSIS — E55.9 VITAMIN D DEFICIENCY: ICD-10-CM

## 2025-08-07 DIAGNOSIS — K76.0 FATTY LIVER: ICD-10-CM

## 2025-08-07 DIAGNOSIS — F43.9 STRESS: ICD-10-CM

## 2025-08-07 DIAGNOSIS — R73.03 PREDIABETES: ICD-10-CM

## 2025-08-07 DIAGNOSIS — Z51.81 ENCOUNTER FOR THERAPEUTIC DRUG MONITORING: Primary | ICD-10-CM

## 2025-08-07 DIAGNOSIS — F32.A ANXIETY AND DEPRESSION: ICD-10-CM

## 2025-08-07 DIAGNOSIS — M79.7 FIBROMYALGIA: ICD-10-CM

## 2025-08-07 DIAGNOSIS — K21.9 GASTROESOPHAGEAL REFLUX DISEASE, UNSPECIFIED WHETHER ESOPHAGITIS PRESENT: ICD-10-CM

## 2025-08-07 DIAGNOSIS — E78.2 MIXED HYPERLIPIDEMIA: ICD-10-CM

## 2025-08-07 LAB — VIT B12 SERPL-MCNC: 352 PG/ML (ref 211–911)

## 2025-08-07 PROCEDURE — 99215 OFFICE O/P EST HI 40 MIN: CPT | Performed by: NURSE PRACTITIONER

## 2025-08-07 PROCEDURE — 82607 VITAMIN B-12: CPT | Performed by: NURSE PRACTITIONER

## 2025-08-07 PROCEDURE — 3079F DIAST BP 80-89 MM HG: CPT | Performed by: NURSE PRACTITIONER

## 2025-08-07 PROCEDURE — 82306 VITAMIN D 25 HYDROXY: CPT | Performed by: NURSE PRACTITIONER

## 2025-08-07 PROCEDURE — 3077F SYST BP >= 140 MM HG: CPT | Performed by: NURSE PRACTITIONER

## 2025-08-07 PROCEDURE — 3008F BODY MASS INDEX DOCD: CPT | Performed by: NURSE PRACTITIONER

## 2025-08-07 RX ORDER — CARIPRAZINE 3 MG/1
1 CAPSULE, GELATIN COATED ORAL EVERY MORNING
COMMUNITY
Start: 2025-07-20

## 2025-08-07 RX ORDER — METHYLPHENIDATE HYDROCHLORIDE 20 MG/1
20 TABLET ORAL DAILY
COMMUNITY
Start: 2025-05-13

## 2025-08-07 RX ORDER — ERENUMAB-AOOE 70 MG/ML
70 INJECTION SUBCUTANEOUS
COMMUNITY
Start: 2025-06-24

## 2025-08-07 RX ORDER — TIRZEPATIDE 2.5 MG/.5ML
2.5 INJECTION, SOLUTION SUBCUTANEOUS WEEKLY
Qty: 2 ML | Refills: 0 | Status: SHIPPED | OUTPATIENT
Start: 2025-08-07

## 2025-08-07 RX ORDER — TIRZEPATIDE 5 MG/.5ML
5 INJECTION, SOLUTION SUBCUTANEOUS WEEKLY
Qty: 2 ML | Refills: 2 | Status: SHIPPED | OUTPATIENT
Start: 2025-08-07

## 2025-08-08 LAB — VIT D+METAB SERPL-MCNC: 34.4 NG/ML (ref 30–100)

## 2025-08-09 VITALS
WEIGHT: 293 LBS | DIASTOLIC BLOOD PRESSURE: 84 MMHG | SYSTOLIC BLOOD PRESSURE: 140 MMHG | HEART RATE: 92 BPM | HEIGHT: 63.5 IN | BODY MASS INDEX: 51.27 KG/M2 | RESPIRATION RATE: 16 BRPM

## 2025-08-09 PROBLEM — K76.0 FATTY LIVER: Status: ACTIVE | Noted: 2025-08-09

## 2025-08-13 ENCOUNTER — TELEPHONE (OUTPATIENT)
Dept: INTERNAL MEDICINE CLINIC | Facility: CLINIC | Age: 55
End: 2025-08-13

## (undated) DEVICE — ZIMMER® STERILE DISPOSABLE TOURNIQUET CUFF WITH PLC, DUAL PORT, SINGLE BLADDER, 24 IN. (61 CM)

## (undated) DEVICE — GOWN SURG AERO CHROME XXL

## (undated) DEVICE — CARPAL TUNNEL KNIVES 5 PACK: Brand: SAFEGUARD MINI CARPAL TUNNEL RELEASE SYSTEM

## (undated) DEVICE — STANDARD HYPODERMIC NEEDLE,POLYPROPYLENE HUB: Brand: MONOJECT

## (undated) DEVICE — SUTURE ETHILON 4-0 P-3

## (undated) DEVICE — UPPER EXTREMITY CDS-LF: Brand: MEDLINE INDUSTRIES, INC.

## (undated) DEVICE — 3M(TM) STERI-STRIP(TM) ANTIMICROBIAL SKIN CLOSURES (REINFORCED) A1846: Brand: 3M™ STERI-STRIP™

## (undated) DEVICE — GIJAW SINGLE-USE BIOPSY FORCEPS WITH NEEDLE: Brand: GIJAW

## (undated) DEVICE — 10FT COMBINED O2 DELIVERY/CO2 MONITORING. FILTER WITH MICROSTREAM TYPE LUER: Brand: DUAL ADULT NASAL CANNULA

## (undated) DEVICE — STERIS KITS

## (undated) DEVICE — CONVERTORS STOCKINETTE: Brand: CONVERTORS

## (undated) DEVICE — XEROFORM OCCLUSIVE GAUZE STRIP OVERWRAP, 3% BISMUTH TRIBROMOPHENATE IN PETROLATUM BLEND: Brand: XEROFORM

## (undated) DEVICE — GLOVE,SURG,SENSICARE SLT,LF,PF,8: Brand: MEDLINE

## (undated) DEVICE — DISPOSABLE BIPOLAR FORCEPS 4" (10.2CM) JEWELERS, STRAIGHT 0.4MM TIP AND 12 FT. (3.6M) CABLE: Brand: KIRWAN

## (undated) DEVICE — GLOVE SUR 7.5 SENSICARE PI PIP CRM PWD F

## (undated) DEVICE — SLEEVE COMPR MD KNEE LEN SGL USE KENDALL SCD

## (undated) DEVICE — STERILE POLYISOPRENE POWDER-FREE SURGICAL GLOVES: Brand: PROTEXIS

## (undated) DEVICE — 1200CC GUARDIAN II: Brand: GUARDIAN

## (undated) DEVICE — SOLUTION IRRIG 1000ML 0.9% NACL USP BTL

## (undated) DEVICE — STERILE SYNTHETIC POLYISOPRENE POWDER-FREE SURGICAL GLOVES WITH HYDROGEL COATING: Brand: PROTEXIS

## (undated) DEVICE — PADDING CAST 4INX4YD 100% COT SFT SLF BOND

## (undated) DEVICE — KIT VLV 5 PC AIR H2O SUCT BX ENDOGATOR CONN

## (undated) DEVICE — BITEBLOCK ENDOSCP 60FR MAXI STRP

## (undated) DEVICE — KENDALL SCD EXPRESS SLEEVES, KNEE LENGTH, MEDIUM: Brand: KENDALL SCD

## (undated) DEVICE — SUT ETHLN 4-0 18IN NABSRB BLK 19MM PS

## (undated) DEVICE — GOWN SUR 2XL LEV 4 BLU W/ WHT V NK BND AERO

## (undated) DEVICE — 3M™ RED DOT™ MONITORING ELECTRODE WITH FOAM TAPE AND STICKY GEL, 50/BAG, 20/CASE, 72/PLT 2570: Brand: RED DOT™

## (undated) DEVICE — SYRINGE BULB 50/CS 48/PLT: Brand: MEDEGEN MEDICAL PRODUCTS, LLC

## (undated) DEVICE — SOL  .9 1000ML BTL

## (undated) NOTE — LETTER
Date & Time: 5/1/2024, 1:49 PM  Patient: Jennifer Wills  Encounter Provider(s):    Karina Cantu MD       To Whom It May Concern:    Jennifer Wills was seen and treated in our department on 5/1/2024. She should not return to work until Monday, May 6 .    If you have any questions or concerns, please do not hesitate to call.        _____________________________  Physician/APC Signature

## (undated) NOTE — ED AVS SNAPSHOT
Sid Gaming   MRN: [id number]    Department:  [facility] Emergency Department   Date of Visit:  5/30/2018           Disclosure     Insurance plans vary and the physician(s) referred by the ER may not be covered by your plan.  Please contact you tell this physician (or your personal doctor if your instructions are to return to your personal doctor) about any new or lasting problems. The primary care or specialist physician will see patients referred from the [facility] Emergency Department.  [place]

## (undated) NOTE — LETTER
Date & Time: 9/21/2022, 2:37 PM  Patient: Jack Bean  Encounter Provider(s): Moriah Rodríguez MD       To Whom It May Concern:    Joe Crew was seen for severe medical condition and treated in our department on 9/21/2022.  She should not return until 09/26/2022  If you have any questions or concerns, please do not hesitate to call.        _____________________________  Physician/APC Signature

## (undated) NOTE — LETTER
February 17, 2017    Patient: Iraj Kim   Date of Visit: 2/17/2017       To Whom It May Concern:    Rachel Bolanos was seen and treated in our emergency department on 2/17/2017. She should not return to work until February 21.     If you have any q

## (undated) NOTE — LETTER
Date & Time: 4/24/2024, 9:06 AM  Patient: Jennifer Wills  Encounter Provider(s):    Anahi Deluna PA       To Whom It May Concern:    Jennifer Wills was seen and treated in our department on 4/24/2024.  Patient has been diagnosed with pneumonia.  Patient will return to work on Monday if feeling better and fever free.    If you have any questions or concerns, please do not hesitate to call.        _____________________________  Physician/APC Signature

## (undated) NOTE — LETTER
Date & Time: 9/21/2022, 2:28 PM  Patient: Marti Wilkins  Encounter Provider(s): Yanet Knapp MD       To Whom It May Concern:    Eliazar Acosta was seen and treated in our department on 9/21/2022. She should not return to work until 9/23/22.     If you have any questions or concerns, please do not hesitate to call.        _____________________________  Physician/APC Signature

## (undated) NOTE — LETTER
Arthuro Sacks Dr. Buddy [place], DO        I acknowledge that I have been informed of the risk involved by refusing the urine pregnancy test on [facility]. I, thereby, release [facility], its personnel, and the attending physician from all responsibility for any ill effects which may result from this action.                             DATE _______________  TIME __________ AM/PM             _________________________________________________                                                                                                                                              Patient Signature        ____________________________________________________                                      Relationship          DATE _______________  TIME __________ AM/PM            __________________________________________________                                                                                                                                                       Witness Signature        Patient Name: [facility]     : 1970    Page 1      Printed: 2023       Medical Record #: [id number]   Revised (03)

## (undated) NOTE — LETTER
Date & Time: 10/28/2024, 8:22 AM  Patient: Jennifer Wills  Encounter Provider(s):    Bonita Bonilla APRN       To Whom It May Concern:    Jennifer Wills was seen and treated in our department on 10/28/2024. She should not return to work until 10/30/24 .    If you have any questions or concerns, please do not hesitate to call.        _____________________________  Physician/APC Signature

## (undated) NOTE — ED AVS SNAPSHOT
[facility] Emergency Department    [place]  [address] [address]    Phone:  [phone]    Fax:  [phone] [facility]   MRN: [id number]    Department:  [facility] Emergency Department   Date of Visit:  2/17 IF THERE IS ANY CHANGE OR WORSENING OF YOUR CONDITION, CALL YOUR PRIMARY CARE PHYSICIAN AT ONCE OR RETURN IMMEDIATELY TO THE EMERGENCY DEPARTMENT.     If you have been prescribed any medication(s), please fill your prescription right away and begin taking t

## (undated) NOTE — LETTER
Date & Time: 9/21/2022, 2:35 PM  Patient: Duke Muir  Encounter Provider(s): Saskia Marion MD       To Whom It May Concern:    Kim Little was seen for severe medical condition and treated in our department on 9/21/2022. She should not return to work until 09/23/2022.   If you have any questions or concerns, please do not hesitate to call.        _____________________________  Physician/APC Signature